# Patient Record
Sex: MALE | Race: WHITE | NOT HISPANIC OR LATINO | Employment: FULL TIME | URBAN - METROPOLITAN AREA
[De-identification: names, ages, dates, MRNs, and addresses within clinical notes are randomized per-mention and may not be internally consistent; named-entity substitution may affect disease eponyms.]

---

## 2017-01-30 ENCOUNTER — GENERIC CONVERSION - ENCOUNTER (OUTPATIENT)
Dept: OTHER | Facility: OTHER | Age: 57
End: 2017-01-30

## 2017-03-03 ENCOUNTER — ALLSCRIPTS OFFICE VISIT (OUTPATIENT)
Dept: OTHER | Facility: OTHER | Age: 57
End: 2017-03-03

## 2017-03-03 DIAGNOSIS — M72.2 PLANTAR FASCIAL FIBROMATOSIS: ICD-10-CM

## 2017-03-13 ENCOUNTER — GENERIC CONVERSION - ENCOUNTER (OUTPATIENT)
Dept: OTHER | Facility: OTHER | Age: 57
End: 2017-03-13

## 2017-04-24 ENCOUNTER — GENERIC CONVERSION - ENCOUNTER (OUTPATIENT)
Dept: OTHER | Facility: OTHER | Age: 57
End: 2017-04-24

## 2017-08-24 ENCOUNTER — GENERIC CONVERSION - ENCOUNTER (OUTPATIENT)
Dept: OTHER | Facility: OTHER | Age: 57
End: 2017-08-24

## 2018-01-14 VITALS
BODY MASS INDEX: 26.21 KG/M2 | DIASTOLIC BLOOD PRESSURE: 78 MMHG | SYSTOLIC BLOOD PRESSURE: 130 MMHG | WEIGHT: 210.8 LBS | HEIGHT: 75 IN | HEART RATE: 72 BPM | TEMPERATURE: 97.8 F

## 2018-05-17 DIAGNOSIS — G47.00 INSOMNIA, UNSPECIFIED TYPE: Primary | ICD-10-CM

## 2018-05-17 RX ORDER — ZOLPIDEM TARTRATE 12.5 MG/1
1 TABLET, FILM COATED, EXTENDED RELEASE ORAL
COMMUNITY
End: 2018-05-17 | Stop reason: SDUPTHER

## 2018-05-17 NOTE — TELEPHONE ENCOUNTER
Patient left voice mail requesting medication refill on his Zolpidem 12 5 MG to be sent to [facility]  Verify diagnosis and quantity   Not in allscripts  Patient has not been seen since 3/2017

## 2018-05-18 RX ORDER — ZOLPIDEM TARTRATE 12.5 MG/1
12.5 TABLET, FILM COATED, EXTENDED RELEASE ORAL
Qty: 30 TABLET | Refills: 0 | Status: SHIPPED | OUTPATIENT
Start: 2018-05-18 | End: 2018-06-19 | Stop reason: SDUPTHER

## 2018-06-19 DIAGNOSIS — G47.00 INSOMNIA, UNSPECIFIED TYPE: ICD-10-CM

## 2018-06-19 RX ORDER — ZOLPIDEM TARTRATE 12.5 MG/1
12.5 TABLET, FILM COATED, EXTENDED RELEASE ORAL
Qty: 15 TABLET | Refills: 0 | Status: SHIPPED | OUTPATIENT
Start: 2018-06-19 | End: 2018-06-19 | Stop reason: SDUPTHER

## 2018-06-19 RX ORDER — ZOLPIDEM TARTRATE 12.5 MG/1
12.5 TABLET, FILM COATED, EXTENDED RELEASE ORAL
Qty: 15 TABLET | Refills: 0 | Status: SHIPPED | OUTPATIENT
Start: 2018-06-19 | End: 2018-06-25 | Stop reason: SDUPTHER

## 2018-06-25 ENCOUNTER — OFFICE VISIT (OUTPATIENT)
Dept: FAMILY MEDICINE CLINIC | Facility: CLINIC | Age: 58
End: 2018-06-25
Payer: COMMERCIAL

## 2018-06-25 VITALS
DIASTOLIC BLOOD PRESSURE: 68 MMHG | HEART RATE: 76 BPM | HEIGHT: 74 IN | WEIGHT: 205.8 LBS | BODY MASS INDEX: 26.41 KG/M2 | RESPIRATION RATE: 18 BRPM | SYSTOLIC BLOOD PRESSURE: 112 MMHG

## 2018-06-25 DIAGNOSIS — G47.00 INSOMNIA, UNSPECIFIED TYPE: Primary | ICD-10-CM

## 2018-06-25 PROCEDURE — 99213 OFFICE O/P EST LOW 20 MIN: CPT | Performed by: FAMILY MEDICINE

## 2018-06-25 PROCEDURE — 3008F BODY MASS INDEX DOCD: CPT | Performed by: FAMILY MEDICINE

## 2018-06-25 RX ORDER — ESOMEPRAZOLE MAGNESIUM 20 MG/1
1 TABLET, DELAYED RELEASE ORAL EVERY 6 HOURS
COMMUNITY
End: 2020-02-19 | Stop reason: CLARIF

## 2018-06-25 RX ORDER — DICYCLOMINE HCL 20 MG
1 TABLET ORAL EVERY 6 HOURS PRN
COMMUNITY
End: 2020-02-19 | Stop reason: CLARIF

## 2018-06-25 RX ORDER — ZOLPIDEM TARTRATE 12.5 MG/1
12.5 TABLET, FILM COATED, EXTENDED RELEASE ORAL
Qty: 30 TABLET | Refills: 5 | Status: SHIPPED | OUTPATIENT
Start: 2018-06-25 | End: 2018-12-18 | Stop reason: SDUPTHER

## 2018-06-25 RX ORDER — LANOLIN ALCOHOL/MO/W.PET/CERES
CREAM (GRAM) TOPICAL
COMMUNITY
Start: 2014-11-24 | End: 2022-05-19

## 2018-06-25 NOTE — PROGRESS NOTES
Assessment/Plan:    Insomnia  Refill med       Diagnoses and all orders for this visit:    Insomnia, unspecified type    Other orders  -     dicyclomine (BENTYL) 20 mg tablet; Take 1 tablet by mouth every 6 (six) hours as needed  -     Esomeprazole Magnesium (NEXIUM 24HR) 20 MG TBEC; Take 1 tablet by mouth every 6 (six) hours  -     glucosamine-chondroitin 500-400 MG tablet;           Subjective:      Patient ID: Walker Combs is a 59 y o  male  Medication Refill   This is a chronic problem  The current episode started more than 1 year ago  The problem occurs constantly  The problem has been unchanged  Associated symptoms include arthralgias  Pertinent negatives include no chest pain or headaches  Nothing aggravates the symptoms  Treatments tried: meds  The treatment provided moderate relief  The following portions of the patient's history were reviewed and updated as appropriate: allergies, current medications, past family history, past medical history, past social history, past surgical history and problem list     Review of Systems   Constitutional: Negative for activity change, appetite change and unexpected weight change  Respiratory: Negative for shortness of breath  Cardiovascular: Negative for chest pain  Gastrointestinal: Negative for abdominal distention  Musculoskeletal: Positive for arthralgias  Neurological: Negative for headaches  Objective:      /68 (BP Location: Right arm, Patient Position: Sitting, Cuff Size: Standard)   Pulse 76   Resp 18   Ht 6' 2 04" (1 881 m)   Wt 93 4 kg (205 lb 12 8 oz)   BMI 26 40 kg/m²          Physical Exam   Constitutional: He appears well-developed and well-nourished

## 2018-11-01 ENCOUNTER — EVALUATION (OUTPATIENT)
Dept: PHYSICAL THERAPY | Facility: CLINIC | Age: 58
End: 2018-11-01
Payer: COMMERCIAL

## 2018-11-01 ENCOUNTER — TRANSCRIBE ORDERS (OUTPATIENT)
Dept: PHYSICAL THERAPY | Facility: CLINIC | Age: 58
End: 2018-11-01

## 2018-11-01 DIAGNOSIS — M25.512 LEFT SHOULDER PAIN, UNSPECIFIED CHRONICITY: Primary | ICD-10-CM

## 2018-11-01 DIAGNOSIS — M25.512 ACUTE PAIN OF LEFT SHOULDER: Primary | ICD-10-CM

## 2018-11-01 PROCEDURE — 97161 PT EVAL LOW COMPLEX 20 MIN: CPT

## 2018-11-01 PROCEDURE — G8991 OTHER PT/OT GOAL STATUS: HCPCS

## 2018-11-01 PROCEDURE — G8990 OTHER PT/OT CURRENT STATUS: HCPCS

## 2018-11-01 NOTE — PROGRESS NOTES
PT Evaluation     Today's date: 2018  Patient name: Derrick Samuel  : [birth year]  MRN: [id number]  Referring provider: Eliot Soto MD  Dx:   Encounter Diagnosis     ICD-10-CM    1  Acute pain of left shoulder M25 512                   Assessment  Impairments: abnormal or restricted ROM, activity intolerance, impaired physical strength, lacks appropriate home exercise program, pain with function and poor posture   Functional limitations: difficulty completing work duties, difficulty with household chores and completing daily self care activities  Assessment details: Derrick Samuel is a 59 y o  male presenting to PT with pain, decreased shoulder range of motion, decreased strength, and decreased tolerance to activity  Patient would benefit from skilled PT services to address these impairments and to maximize function in order to improve quality of life  Thank you for the referral   Understanding of Dx/Px/POC: good   Prognosis: good    Goals  STG  1) L shoulder ROM will improve 50% in 3 weeks  2) L shoulder strength will improve 1/2 grade in 3 weeks  LTG  1) Patient is independent in HEP within 6 weeks  2) L shoulder strength will be WFL and pain free in 6 weeks  3) Patient completes overhead activity with 0-1/10 pain within 6 weeks  Plan  Patient would benefit from: skilled physical therapy  Planned modality interventions: cryotherapy, TENS and unattended electrical stimulation  Planned therapy interventions: joint mobilization, manual therapy, home exercise program, therapeutic exercise, therapeutic activities, stretching, strengthening, postural training, patient education and neuromuscular re-education  Frequency: 2x week  Duration in weeks: 6  Treatment plan discussed with: patient        Subjective Evaluation    History of Present Illness  Mechanism of injury: Patient presents with L shoulder pain gradually worsening, beginning about 6 weeks ago      He states he works in the [email or web address], "lots of heavy lifting, a lot of times to or above shoulder height"  He had a RTC repair on R shoulder in 2015, with 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  He had a cortisone injection into L shoulder two days ago and states he has not noticed any relief since then  He would like to know what exercises he should be doing to improve L shoulder pain and function and avoid surgery down the road  Quality of life: good    Pain  Current pain ratin  At best pain ratin  At worst pain ratin  Quality: discomfort, dull ache and knife-like  Relieving factors: medications and ice  Aggravating factors: overhead activity and lifting  Progression: worsening    Treatments  Previous treatment: injection treatment  Patient Goals  Patient goals for therapy: decreased pain, increased motion, increased strength, independence with ADLs/IADLs and return to sport/leisure activities          Objective     Postural Observations  Seated posture: fair  Standing posture: fair  Correction of posture: makes symptoms better        Palpation   Left   Tenderness of the levator scapulae and upper trapezius  Tenderness     Left Shoulder   No tenderness in the [facility] [place] joint, acromion, biceps tendon (proximal), bicipital groove, subacromial bursa and subscapularis tendon       Cervical/Thoracic Screen   Cervical range of motion within normal limits    Neurological Testing     Sensation     Shoulder   Left Shoulder   Intact: light touch    Right Shoulder   Intact: light touch    Active Range of Motion   Left Shoulder   Flexion: 125 degrees with pain  Abduction: 112 degrees with pain  External rotation BTH: C4   Internal rotation BTB: L2 with pain    Passive Range of Motion   Left Shoulder   Flexion: 140 degrees with pain  Abduction: 120 degrees with pain  External rotation 45°: 45 degrees with pain  Internal rotation 45°: 40 degrees     Joint Play   Left Shoulder  Joints within functional limits are the anterior capsule  Hypomobile in the posterior capsule and inferior capsule  Strength/Myotome Testing     Left Shoulder     Planes of Motion   Flexion: 4-   Extension: 4-   Abduction: 4-   External rotation at 0°: 4-   Internal rotation at 0°: 4-   Horizontal abduction: 3+     Isolated Muscles   Biceps: 4-   Lower trapezius: 3+   Middle trapezius: 3+   Rhomboids: 3+   Subscapularis: 4-   Triceps: 4     Tests     Left Shoulder   Positive empty can, Hawkin's and passive horizontal adduction  Negative full can and bicep load          Flowsheet Rows      Most Recent Value   PT/OT G-Codes   Current Score  64   Projected Score  71   FOTO information reviewed  Yes   Assessment Type  Evaluation   G code set  Other PT/OT Primary   Other PT Primary Current Status ()  CJ   Other PT Primary Goal Status ()  CJ          Precautions: none    Daily Treatment Diary     Manuals             PROM             GHJ AP/Inf mobs                                       Exercise Diary              Pulleys             UBE             Shoulder isometrics             ER door stretch                          TB rows/ext             TB robbers             TB lawn mowers             Standing IR- cane up back                                       Table slides abduction             Serratus punch             Supine AAROM cane flexion             Sleeper stretch                                                                              Modalities             CP prn

## 2018-11-01 NOTE — LETTER
2018    Griselda Reveles MD  [id number] [facility]    Patient: Cynthia Minaya   YOB: 1960   Date of Visit: 2018     Encounter Diagnosis     ICD-10-CM    1  Acute pain of left shoulder M25 512        Dear Dr Raquel Harris:    Please review the attached Plan of Care from [facility] recent visit  Please verify that you agree therapy should continue by signing the attached document and sending it back to our office  If you have any questions or concerns, please don't hesitate to call  Sincerely,    Anayeli Hobbs, PT      Referring Provider:      I certify that I have read the below Plan of Care and certify the need for these services furnished under this plan of treatment while under my care  MD Kurt Lentz [address]: [phone]          PT Evaluation     Today's date: 2018  Patient name: Cynthia Minaya  : [birth year]  MRN: [id number]  Referring provider: Nik Mayo MD  Dx:   Encounter Diagnosis     ICD-10-CM    1  Acute pain of left shoulder M25 512                   Assessment  Impairments: abnormal or restricted ROM, activity intolerance, impaired physical strength, lacks appropriate home exercise program, pain with function and poor posture   Functional limitations: difficulty completing work duties, difficulty with household chores and completing daily self care activities  Assessment details: Cynthia Minaya is a 59 y o  male presenting to PT with pain, decreased shoulder range of motion, decreased strength, and decreased tolerance to activity  Patient would benefit from skilled PT services to address these impairments and to maximize function in order to improve quality of life  Thank you for the referral   Understanding of Dx/Px/POC: good   Prognosis: good    Goals  STG  1) L shoulder ROM will improve 50% in 3 weeks    2) L shoulder strength will improve 1/2 grade in 3 weeks  LTG  1) Patient is independent in HEP within 6 weeks  2) L shoulder strength will be WFL and pain free in 6 weeks  3) Patient completes overhead activity with 0-1/10 pain within 6 weeks  Plan  Patient would benefit from: skilled physical therapy  Planned modality interventions: cryotherapy, TENS and unattended electrical stimulation  Planned therapy interventions: joint mobilization, manual therapy, home exercise program, therapeutic exercise, therapeutic activities, stretching, strengthening, postural training, patient education and neuromuscular re-education  Frequency: 2x week  Duration in weeks: 6  Treatment plan discussed with: patient        Subjective Evaluation    History of Present Illness  Mechanism of injury: Patient presents with L shoulder pain gradually worsening, beginning about 6 weeks ago  He states he works in the [employer], "lots of heavy lifting, a lot of times to or above shoulder height"  He had a RTC repair on R shoulder in , with 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  He had a cortisone injection into L shoulder two days ago and states he has not noticed any relief since then  He would like to know what exercises he should be doing to improve L shoulder pain and function and avoid surgery down the road    Quality of life: good    Pain  Current pain ratin  At best pain ratin  At worst pain ratin  Quality: discomfort, dull ache and knife-like  Relieving factors: medications and ice  Aggravating factors: overhead activity and lifting  Progression: worsening    Treatments  Previous treatment: injection treatment  Patient Goals  Patient goals for therapy: decreased pain, increased motion, increased strength, independence with ADLs/IADLs and return to sport/leisure activities          Objective     Postural Observations  Seated posture: fair  Standing posture: fair  Correction of posture: makes symptoms better        Palpation   Left   Tenderness of the levator scapulae and upper trapezius  Tenderness     Left Shoulder   [facility] joint, acromion, biceps tendon (proximal), bicipital groove, subacromial bursa and subscapularis tendon  Cervical/Thoracic Screen   Cervical range of motion within normal limits    Neurological Testing     Sensation     Shoulder   Left Shoulder   Intact: light touch    Right Shoulder   Intact: light touch    Active Range of Motion   Left Shoulder   Flexion: 125 degrees with pain  Abduction: 112 degrees with pain  External rotation BTH: C4   Internal rotation BTB: L2 with pain    Passive Range of Motion   Left Shoulder   Flexion: 140 degrees with pain  Abduction: 120 degrees with pain  External rotation 45°:  45 degrees with pain  Internal rotation 45°:  40 degrees     Joint Play   Left Shoulder  Joints within functional limits are the anterior capsule  Hypomobile in the posterior capsule and inferior capsule  Strength/Myotome Testing     Left Shoulder     Planes of Motion   Flexion: 4-   Extension: 4-   Abduction: 4-   External rotation at 0°:  4-   Internal rotation at 0°:  4-   Horizontal abduction: 3+     Isolated Muscles   Biceps: 4-   Lower trapezius: 3+   Middle trapezius: 3+   Rhomboids: 3+   Subscapularis: 4-   Triceps: 4     Tests     Left Shoulder   Positive empty can, Hawkin's and passive horizontal adduction  Negative full can and bicep load          Flowsheet Rows      Most Recent Value   PT/OT G-Codes   Current Score  64   Projected Score  71   FOTO information reviewed  Yes   Assessment Type  Evaluation   G code set  Other PT/OT Primary   Other PT Primary Current Status ()  CJ   Other PT Primary Goal Status ()  CJ          Precautions: none    Daily Treatment Diary     Manuals             PROM             GHJ AP/Inf mobs                                       Exercise Diary              Pulleys             UBE             Shoulder isometrics             ER door stretch                          TB rows/ext             TB robbers             TB lawn mowers             Standing IR- cane up back                                       Table slides abduction             Serratus punch             Supine AAROM cane flexion             Sleeper stretch                                                                              Modalities             CP prn

## 2018-11-05 ENCOUNTER — OFFICE VISIT (OUTPATIENT)
Dept: PHYSICAL THERAPY | Facility: CLINIC | Age: 58
End: 2018-11-05
Payer: COMMERCIAL

## 2018-11-05 DIAGNOSIS — M25.512 ACUTE PAIN OF LEFT SHOULDER: Primary | ICD-10-CM

## 2018-11-05 PROCEDURE — 97112 NEUROMUSCULAR REEDUCATION: CPT

## 2018-11-05 PROCEDURE — 97110 THERAPEUTIC EXERCISES: CPT

## 2018-11-05 NOTE — PROGRESS NOTES
Daily Note     Today's date: 2018  Patient name: Mackenzie Conte  : 1960  MRN: [id number]  Referring provider: Kathleen Chanel MD  Dx:   Encounter Diagnosis     ICD-10-CM    1  Acute pain of left shoulder M25 512                   Subjective: Patient reports shoulder feels sore this morning, stating pain is 1/10 due to stiffness  Objective: See treatment diary below  Progressed ROM/stretching and RTC strengthening program today  Assessment: Tolerated treatment fair  Patient reported increased discomfort in posterior cuff with IR and abduction stretches, which subsides slightly with rest however this does limit patient's ability to complete some exercises due to soreness  Patient demonstrated fatigue post treatment, exhibited good technique with therapeutic exercises and would benefit from continued PT      Plan: Continue per plan of care         Precautions: none    Daily Treatment Diary     Manuals             PROM NV            GHJ AP/Inf mobs NV                                      Exercise Diary              Pulleys 6 min, 10 sec hold            UBE 3 min each            Shoulder isometrics (flex, ext, IR, ER) 5 sec x10            ER door stretch 10sec x 10                         TB rows/ext GTB 3x10            TB robbers NP            TB lawn mowers NP            Standing IR- cane up back 10 sec  X 10                                      Table slides abduction 10 sec  x10            Serratus punch 3 sec, 3x10            Supine AAROM cane flexion 10 sec  x10            Side-lying ER 2x10                                                                             Modalities             CP prn 10 min post

## 2018-11-07 ENCOUNTER — OFFICE VISIT (OUTPATIENT)
Dept: PHYSICAL THERAPY | Facility: CLINIC | Age: 58
End: 2018-11-07
Payer: COMMERCIAL

## 2018-11-07 DIAGNOSIS — M25.512 ACUTE PAIN OF LEFT SHOULDER: Primary | ICD-10-CM

## 2018-11-07 PROCEDURE — 97110 THERAPEUTIC EXERCISES: CPT

## 2018-11-07 PROCEDURE — 97112 NEUROMUSCULAR REEDUCATION: CPT

## 2018-11-07 NOTE — PROGRESS NOTES
Daily Note     Today's date: 2018  Patient name: Meenakshi Sandoval  : 1960  MRN: [id number]  Referring provider: Burgess Vince MD  Dx:   Encounter Diagnosis     ICD-10-CM    1  Acute pain of left shoulder M25 512        Start Time: 0900  Stop Time: 1005  Total time in clinic (min): 65 minutes    Subjective: Patient states "I think I over did it on Monday  I had soreness that made it hard to sleep that night " Patient reports soreness today secondary to working last night  Patient had slight irritation with B shoulders during TB ext  Objective: See treatment diary below  Assessment: Tolerated treatment fair  Patient demonstrated fatigue post treatment, exhibited good technique with therapeutic exercises and would benefit from continued PT  Plan: Continue per plan of care       Precautions: none    Daily Treatment Diary     Manuals            PROM NV 10'           GHJ AP/Inf mobs NV np                                     Exercise Diary              Pulleys 6 min, 10 sec hold 6 min, 10 sec hold           UBE 3 min each 3 min each           Shoulder isometrics (flex, ext, IR, ER) 5 sec x10 5 sec x10           ER door stretch 10sec x 10 10sec x10                        TB rows/ext GTB 3x10 GTB  3x10           TB robbers NP np           TB lawn mowers NP np           Standing IR- cane up back 10 sec  X 10 10sec x10                                     Table slides abduction 10 sec  x10 10sec x10           Serratus punch 3 sec, 3x10 3 sec,  3x10           Supine AAROM cane flexion 10 sec  x10 10sec  x10           Side-lying ER 2x10 2x10                                                                            Modalities             CP prn 10 min post

## 2018-11-13 ENCOUNTER — OFFICE VISIT (OUTPATIENT)
Dept: PHYSICAL THERAPY | Facility: CLINIC | Age: 58
End: 2018-11-13
Payer: COMMERCIAL

## 2018-11-13 DIAGNOSIS — M25.512 ACUTE PAIN OF LEFT SHOULDER: Primary | ICD-10-CM

## 2018-11-13 PROCEDURE — 97112 NEUROMUSCULAR REEDUCATION: CPT

## 2018-11-13 PROCEDURE — 97110 THERAPEUTIC EXERCISES: CPT

## 2018-11-13 PROCEDURE — 97140 MANUAL THERAPY 1/> REGIONS: CPT

## 2018-11-13 NOTE — PROGRESS NOTES
Daily Note     Today's date: 2018  Patient name: Evangelina Ballard  :   MRN: [id number]  Referring provider: Bella Kern MD  Dx:   Encounter Diagnosis     ICD-10-CM    1  Acute pain of left shoulder M25 512                   Subjective: Patient reported pain with reaching out to the side (abd) with L UE, happening at work last night when he used that motion, pain reaching 8/10  Objective: See treatment diary below      Assessment: Most difficulty with completion of table slides in abd due to pain response  Guarding and pain response into end ranges for PROM, most limited with abd  Plan: Continue per plan of care         Precautions: none    Daily Treatment Diary   Manuals           PROM NV 10' 10 min          GHJ AP/Inf mobs NV np AN                                    Exercise Diary              Pulleys 6 min, 10 sec hold 6 min, 10 sec hold 6 min, 10" hold          UBE 3 min each 3 min each 3 min ea          Shoulder isometrics (flex, ext, IR, ER) 5 sec x10 5 sec x10 5"x10 ea          ER door stretch 10sec x 10 10sec x10 10"x10                       TB rows/ext GTB 3x10 GTB  3x10 Green  3x10          TB robbers NP np NV          TB lawn mowers NP np NV          Standing IR- cane up back 10 sec  X 10 10sec x10 10"x 10                                    Table slides abduction 10 sec  x10 10sec x10 10"x10          Serratus punch 3 sec, 3x10 3 sec,  3x10 3" hold, 3x10          Supine AAROM cane flexion 10 sec  x10 10sec  x10 10"x10          Side-lying ER 2x10 2x10 2x10                                                                           Modalities             CP prn 10 min post  10 min post

## 2018-11-16 ENCOUNTER — OFFICE VISIT (OUTPATIENT)
Dept: PHYSICAL THERAPY | Facility: CLINIC | Age: 58
End: 2018-11-16
Payer: COMMERCIAL

## 2018-11-16 DIAGNOSIS — M25.512 ACUTE PAIN OF LEFT SHOULDER: Primary | ICD-10-CM

## 2018-11-16 PROCEDURE — 97110 THERAPEUTIC EXERCISES: CPT

## 2018-11-16 PROCEDURE — 97140 MANUAL THERAPY 1/> REGIONS: CPT

## 2018-11-16 NOTE — PROGRESS NOTES
Daily Note     Today's date: 2018  Patient name: Kenia Granados  :   MRN: [id number]  Referring provider: Dharmesh Rogers MD  Dx:   Encounter Diagnosis     ICD-10-CM    1  Acute pain of left shoulder M25 512                   Subjective: Patient reports pain in L shoulder/middle deltoid today, rating as 3/10  He states his pain has been pretty constant, and he has not been able to find any position of comfort or relief of pain over the past week  Objective: See treatment diary below  Modified exercises today to perform less volume per patient request       Assessment: Tolerated treatment fair  Patient able to tolerate completion of program today, with modifications to perform less volume and no resistance for strengthening program  Through manuals, noted limitations in all planes, with increased pain at end range  Patient demonstrated fatigue post treatment, exhibited good technique with therapeutic exercises and would benefit from continued PT      Plan: Continue per plan of care         Precautions: none    Daily Treatment Diary    Manuals          PROM NV 10' 10 min AN         GHJ AP/Inf mobs NV np AN AN                                   Exercise Diary              Pulleys 6 min, 10 sec hold 6 min, 10 sec hold 6 min, 10" hold 6 min, 10 sec hold         UBE 3 min each 3 min each 3 min ea 3 min each         Shoulder isometrics (flex, ext, IR, ER) 5 sec x10 5 sec x10 5"x10 ea 5"x10 ea         ER door stretch 10sec x 10 10sec x10 10"x10 10"x10                      TB rows/ext GTB 3x10 GTB  3x10 Green  3x10 NP         TB robbers NP np NV NP         TB lawn mowers NP np NV NP         Standing IR- cane up back 10 sec  X 10 10sec x10 10" x10 10 sec x10                                   Table slides abduction 10 sec  x10 10sec x10 10"x10 10 sec x10         Serratus punch 3 sec, 3x10 3 sec,  3x10 3" hold, 3x10 3 sec, 3x10         Supine AAROM cane flexion 10 sec  x10 10sec  x10 10"x10 10 sec x10         Side-lying ER 2x10 2x10 2x10 10x                                                                          Modalities             CP prn 10 min post  10 min post 10 min post

## 2018-11-19 ENCOUNTER — OFFICE VISIT (OUTPATIENT)
Dept: PHYSICAL THERAPY | Facility: CLINIC | Age: 58
End: 2018-11-19
Payer: COMMERCIAL

## 2018-11-19 DIAGNOSIS — M25.512 ACUTE PAIN OF LEFT SHOULDER: Primary | ICD-10-CM

## 2018-11-19 PROCEDURE — 97140 MANUAL THERAPY 1/> REGIONS: CPT

## 2018-11-19 PROCEDURE — 97110 THERAPEUTIC EXERCISES: CPT

## 2018-11-19 NOTE — PROGRESS NOTES
Daily Note     Today's date: 2018  Patient name: Derrick Samuel  : 1960  MRN: [id number]  Referring provider: Eliot Soto MD  Dx:   Encounter Diagnosis     ICD-10-CM    1  Acute pain of left shoulder M25 512                   Subjective: Patient rates L shoulder pain as 1/10 upon arrival today, stating he essentially took the weekend off to allow his shoulder to rest after increased pain last week  Objective: See treatment diary below  Assessment: Tolerated treatment well  Completed manuals at beginning of session today, before doing any active stretching or strengthening  Patient reports this feels a little better today  He does opt for CP at end of session due to muscle soreness after strengthening  Patient demonstrated fatigue post treatment, exhibited good technique with therapeutic exercises and would benefit from continued PT      Plan: Continue per plan of care         Precautions: none    Daily Treatment Diary    Manuals         PROM NV 10' 10 min AN AN        GHJ AP/Inf mobs NV np AN AN AN                                  Exercise Diary              Pulleys 6 min, 10 sec hold 6 min, 10 sec hold 6 min, 10" hold 6 min, 10 sec hold 6 min, 10 sec hold        UBE 3 min each 3 min each 3 min ea 3 min each 2 min each        Shoulder isometrics (flex, ext, IR, ER) 5 sec x10 5 sec x10 5"x10 ea 5"x10 ea 5 sec x10 ea        ER door stretch 10sec x 10 10sec x10 10"x10 10"x10 10sec x10                     TB rows/ext GTB 3x10 GTB  3x10 Green  3x10 NP NV        TB robbers NP np NV NP NP        TB lawn mowers NP np NV NP NP        Standing IR- cane up back 10 sec  X 10 10sec x10 10" x10 10 sec x10 10 sec x10                                  Table slides abduction 10 sec  x10 10sec x10 10"x10 10 sec x10 10 sec x10        Serratus punch 3 sec, 3x10 3 sec,  3x10 3" hold, 3x10 3 sec, 3x10 3 sec, 3x10        Supine AAROM cane flexion 10 sec  x10 10sec  x10 10"x10 10 sec x10 10 sec x10        Side-lying ER 2x10 2x10 2x10 10x 10x                                                                         Modalities             CP prn 10 min post  10 min post 10 min post 10 min post

## 2018-11-23 ENCOUNTER — OFFICE VISIT (OUTPATIENT)
Dept: PHYSICAL THERAPY | Facility: CLINIC | Age: 58
End: 2018-11-23
Payer: COMMERCIAL

## 2018-11-23 DIAGNOSIS — M25.512 ACUTE PAIN OF LEFT SHOULDER: Primary | ICD-10-CM

## 2018-11-23 PROCEDURE — G8991 OTHER PT/OT GOAL STATUS: HCPCS

## 2018-11-23 PROCEDURE — 97110 THERAPEUTIC EXERCISES: CPT

## 2018-11-23 PROCEDURE — 97140 MANUAL THERAPY 1/> REGIONS: CPT

## 2018-11-23 PROCEDURE — G8990 OTHER PT/OT CURRENT STATUS: HCPCS

## 2018-11-23 NOTE — PROGRESS NOTES
PT Re-Evaluation     Today's date: 2018  Patient name: Jamin Ford  :   MRN: [id number]  Referring provider: Judith Jimenez MD  Dx:   Encounter Diagnosis     ICD-10-CM    1  Acute pain of left shoulder M25 512                   Assessment  Assessment details: Jamin Ford has been compliant with attending PT and HEP since initial eval  Caity Hayward has made improvements in objective data since IE but is still limited compared to normal  Caity Hayward continues with above listed impairments, including pain which limits ROM and strength, and would benefit from additional skilled PT to address these deficits to return to PLOF  Impairments: abnormal or restricted ROM, activity intolerance, impaired physical strength, pain with function and poor posture   Functional limitations: difficulty completing work duties, difficulty with household chores and completing daily self care activities  Symptom irritability: moderateUnderstanding of Dx/Px/POC: good   Prognosis: good    Goals  STG  1) L shoulder ROM will improve 50% in 3 weeks  -Partially met  2) L shoulder strength will improve 1/2 grade in 3 weeks  -Met  LTG  1) Patient is independent in HEP within 6 weeks  2) L shoulder strength will be WFL and pain free in 6 weeks  3) Patient completes overhead activity with 0-1/10 pain within 6 weeks  Plan  Patient would benefit from: skilled physical therapy  Planned modality interventions: cryotherapy  Planned therapy interventions: joint mobilization, manual therapy, home exercise program, therapeutic exercise, therapeutic activities, stretching, strengthening, postural training, patient education, neuromuscular re-education and body mechanics training  Frequency: 2x week  Duration in weeks: 4  Treatment plan discussed with: patient        Subjective Evaluation    History of Present Illness  Mechanism of injury: RE (): Patient reports L shoulder is feeling okay today   He states after his previous session 3 days ago he was driving home and felt increased muscle discomfort in mid back, radiating out from the center  He does not believe this was related to any activity performed at therapy, and thinks he may have aggravated it at work the night before  He is scheduled to return to MD on , and believes he should continue with skilled PT  From  (18): Patient presents with L shoulder pain gradually worsening, beginning about 6 weeks ago  He states he works in the [employer], "lots of heavy lifting, a lot of times to or above shoulder height"  He had a RTC repair on R shoulder in , with 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  He had a cortisone injection into L shoulder two days ago and states he has not noticed any relief since then  He would like to know what exercises he should be doing to improve L shoulder pain and function and avoid surgery down the road  Quality of life: good    Pain  Current pain ratin  At best pain ratin  At worst pain ratin  Quality: discomfort, dull ache and sharp  Relieving factors: ice  Aggravating factors: overhead activity and lifting  Progression: improved    Treatments  Previous treatment: injection treatment  Patient Goals  Patient goals for therapy: decreased pain, increased motion, increased strength, independence with ADLs/IADLs and return to sport/leisure activities          Objective     Postural Observations  Seated posture: fair  Standing posture: fair  Correction of posture: makes symptoms better        Palpation   Left   Tenderness of the levator scapulae and upper trapezius  Tenderness     Left Shoulder   No tenderness in the [facility] [place] joint, acromion, biceps tendon (proximal), bicipital groove, subacromial bursa and subscapularis tendon       Cervical/Thoracic Screen   Cervical range of motion within normal limits    Neurological Testing     Sensation     Shoulder   Left Shoulder   Intact: light touch    Right Shoulder   Intact: light touch    Active Range of Motion   Left Shoulder   Flexion: 140 degrees with pain  Abduction: 120 degrees with pain  External rotation 0°: 65 degrees   Internal rotation BTB: L1 with pain    Passive Range of Motion   Left Shoulder   Flexion: 145 degrees with pain  Abduction: 120 degrees with pain  External rotation 45°: 60 degrees   Internal rotation 0°: [facility]    Joint Play   Left Shoulder  Joints within functional limits are the anterior capsule, posterior capsule and inferior capsule  Strength/Myotome Testing     Left Shoulder     Planes of Motion   Flexion: 4   Extension: 4   Abduction: 4   External rotation at 0°: 4   Internal rotation at 0°: 4+   Horizontal abduction: 4-     Isolated Muscles   Biceps: 4+   Lower trapezius: 4-   Middle trapezius: 4-   Rhomboids: 4-   Subscapularis: 4-   Triceps: 4+     Tests     Left Shoulder   Positive empty can, Hawkin's and passive horizontal adduction  Negative full can and bicep load          Flowsheet Rows      Most Recent Value   PT/OT G-Codes   Current Score  75   Projected Score  71   FOTO information reviewed  Yes   Assessment Type  Re-evaluation   G code set  Other PT/OT Primary   Other PT Primary Current Status ()  CJ   Other PT Primary Goal Status ()  CJ          Precautions: none    Daily Treatment Diary    Manuals 11/5 11/7 11/13 11/16 11/19 11/23       PROM NV 10' 10 min AN AN AN       GHJ AP/Inf mobs NV np AN AN AN AN                                 Exercise Diary              Pulleys 6 min, 10 sec hold 6 min, 10 sec hold 6 min, 10" hold 6 min, 10 sec hold 6 min, 10 sec hold 6 min, 10 sec hold       UBE 3 min each 3 min each 3 min ea 3 min each 2 min each 2 min each       Shoulder isometrics (flex, ext, IR, ER) 5 sec x10 5 sec x10 5"x10 ea 5"x10 ea 5 sec x10 ea 5 sec x10 ea       ER door stretch 10sec x 10 10sec x10 10"x10 10"x10 10sec x10 10 sec  x10                    TB rows/ext GTB 3x10 GTB  3x10 Green  3x10 NP NV NP       TB robbers NP np NV NP NP NP       TB lawn mowers NP np NV NP NP NP       Standing IR- cane up back 10 sec  X 10 10sec x10 10" x10 10 sec  x10 10 sec  x10 10 sec  x10                                 Table slides abduction 10 sec  x10 10sec x10 10"x10 10 sec  x10 10 sec  x10 10 sec  x10       Serratus punch 3 sec, 3x10 3 sec,  3x10 3 sec, 3x10 3 sec, 3x10 3 sec, 3x10 3 sec, 2x20       Supine AAROM cane flexion 10 sec  x10 10sec  x10 10"x10 10 sec  x10 10 sec  x10 10 sec  x10       Side-lying ER 2x10 2x10 2x10 10x 10x 2x10                                                                        Modalities             CP prn 10 min post  10 min post 10 min post 10 min post Pt defers

## 2018-11-23 NOTE — LETTER
2018    Griselda Reveles MD  [address]    Patient: Cynthia Minaya   YOB: 1960   Date of Visit: 2018     Encounter Diagnosis     ICD-10-CM    1  Acute pain of left shoulder M25 512        Dear Dr Raquel Harris:    Please review the attached Plan of Care from [facility] 42 recent visit  Please verify that you agree therapy should continue by signing the attached document and sending it back to our office  If you have any questions or concerns, please don't hesitate to call  Sincerely,    Anayeli Hobbs, PT      Referring Provider:      I certify that I have read the below Plan of Care and certify the need for these services furnished under this plan of treatment while under my care  Griselda Reveles MD  [address] [place] [address] Road: [address] [place] [address]          PT Re-Evaluation     Today's date: 2018  Patient name: Cynthia Minaya  :   MRN: [id number]  Referring provider: Nik Mayo MD  Dx:   Encounter Diagnosis     ICD-10-CM    1  Acute pain of left shoulder M25 512                   Assessment  Assessment details: Cynthia Minaya has been compliant with attending PT and HEP since initial eval  Yoshi Feliz has made improvements in objective data since IE but is still limited compared to normal  Yoshi Feliz continues with above listed impairments, including pain which limits ROM and strength, and would benefit from additional skilled PT to address these deficits to return to PLOF    Impairments: abnormal or restricted ROM, activity intolerance, impaired physical strength, pain with function and poor posture   Functional limitations: difficulty completing work duties, difficulty with household chores and completing daily self care activities  Symptom irritability: moderateUnderstanding of Dx/Px/POC: good   Prognosis: good    Goals  STG  1) L shoulder ROM will improve 50% in 3 weeks  -Partially met  2) L shoulder strength will improve 1/2 grade in 3 weeks  -Met  LTG  1) Patient is independent in HEP within 6 weeks  2) L shoulder strength will be WFL and pain free in 6 weeks  3) Patient completes overhead activity with 0-1/10 pain within 6 weeks  Plan  Patient would benefit from: skilled physical therapy  Planned modality interventions: cryotherapy  Planned therapy interventions: joint mobilization, manual therapy, home exercise program, therapeutic exercise, therapeutic activities, stretching, strengthening, postural training, patient education, neuromuscular re-education and body mechanics training  Frequency: 2x week  Duration in weeks: 4  Treatment plan discussed with: patient        Subjective Evaluation    History of Present Illness  Mechanism of injury: RE (): Patient reports L shoulder is feeling okay today  He states after his previous session 3 days ago he was driving home and felt increased muscle discomfort in mid back, radiating out from the center  He does not believe this was related to any activity performed at therapy, and thinks he may have aggravated it at work the night before  He is scheduled to return to [place] on , and believes he should continue with skilled PT  From IE (18): Patient presents with L shoulder pain gradually worsening, beginning about 6 weeks ago  He states he works in the [employer], "lots of heavy lifting, a lot of times to or above shoulder height"  He had a RTC repair on R shoulder in , with 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  He had a cortisone injection into L shoulder two days ago and states he has not noticed any relief since then  He would like to know what exercises he should be doing to improve L shoulder pain and function and avoid surgery down the road    Quality of life: good    Pain  Current pain ratin  At best pain ratin  At worst pain ratin  Quality: discomfort, dull ache and sharp  Relieving factors: ice  Aggravating factors: overhead activity and lifting  Progression: improved    Treatments  Previous treatment: injection treatment  Patient Goals  Patient goals for therapy: decreased pain, increased motion, increased strength, independence with ADLs/IADLs and return to sport/leisure activities          Objective     Postural Observations  Seated posture: fair  Standing posture: fair  Correction of posture: makes symptoms better        Palpation   Left   Tenderness of the levator scapulae and upper trapezius  Tenderness     Left Shoulder   No tenderness in the [facility] joint, acromion, biceps tendon (proximal), bicipital groove, subacromial bursa and subscapularis tendon  Cervical/Thoracic Screen   Cervical range of motion within normal limits    Neurological Testing     Sensation     Shoulder   Left Shoulder   Intact: light touch    Right Shoulder   Intact: light touch    Active Range of Motion   Left Shoulder   Flexion: 140 degrees with pain  Abduction: 120 degrees with pain  External rotation 0°:  65 degrees   Internal rotation BTB: L1 with pain    Passive Range of Motion   Left Shoulder   Flexion: 145 degrees with pain  Abduction: 120 degrees with pain  External rotation 45°:  60 degrees   Internal rotation 0°:  [facility]    Joint Play   Left Shoulder  Joints within functional limits are the anterior capsule, posterior capsule and inferior capsule  Strength/Myotome Testing     Left Shoulder     Planes of Motion   Flexion: 4   Extension: 4   Abduction: 4   External rotation at 0°:  4   Internal rotation at 0°:  4+   Horizontal abduction: 4-     Isolated Muscles   Biceps: 4+   Lower trapezius: 4-   Middle trapezius: 4-   Rhomboids: 4-   Subscapularis: 4-   Triceps: 4+     Tests     Left Shoulder   Positive empty can, Hawkin's and passive horizontal adduction  Negative full can and bicep load          Flowsheet Rows      Most Recent Value   PT/OT G-Codes Current Score  75   Projected Score  71   FOTO information reviewed  Yes   Assessment Type  Re-evaluation   G code set  Other PT/OT Primary   Other PT Primary Current Status ()  CJ   Other PT Primary Goal Status ()  CJ          Precautions: none    Daily Treatment Diary    Manuals 11/5 11/7 11/13 11/16 11/19 11/23       PROM NV 10' 10 min AN AN AN       GHJ AP/Inf mobs NV np AN AN AN AN                                 Exercise Diary              Pulleys 6 min, 10 sec hold 6 min, 10 sec hold 6 min, 10" hold 6 min, 10 sec hold 6 min, 10 sec hold 6 min, 10 sec hold       UBE 3 min each 3 min each 3 min ea 3 min each 2 min each 2 min each       Shoulder isometrics (flex, ext, IR, ER) 5 sec x10 5 sec x10 5"x10 ea 5"x10 ea 5 sec x10 ea 5 sec x10 ea       ER door stretch 10sec x 10 10sec x10 10"x10 10"x10 10sec x10 10 sec  x10                    TB rows/ext GTB 3x10 GTB  3x10 Green  3x10 NP NV NP       TB robbers NP np NV NP NP NP       TB lawn mowers NP np NV NP NP NP       Standing IR- cane up back 10 sec  X 10 10sec x10 10" x10 10 sec  x10 10 sec  x10 10 sec  x10                                 Table slides abduction 10 sec  x10 10sec x10 10"x10 10 sec  x10 10 sec  x10 10 sec  x10       Serratus punch 3 sec, 3x10 3 sec,  3x10 3 sec, 3x10 3 sec, 3x10 3 sec, 3x10 3 sec, 2x20       Supine AAROM cane flexion 10 sec  x10 10sec  x10 10"x10 10 sec  x10 10 sec  x10 10 sec  x10       Side-lying ER 2x10 2x10 2x10 10x 10x 2x10                                                                        Modalities             CP prn 10 min post  10 min post 10 min post 10 min post Pt defers

## 2018-11-26 ENCOUNTER — OFFICE VISIT (OUTPATIENT)
Dept: PHYSICAL THERAPY | Facility: CLINIC | Age: 58
End: 2018-11-26
Payer: COMMERCIAL

## 2018-11-26 DIAGNOSIS — M25.512 ACUTE PAIN OF LEFT SHOULDER: Primary | ICD-10-CM

## 2018-11-26 PROCEDURE — 97110 THERAPEUTIC EXERCISES: CPT

## 2018-11-26 PROCEDURE — 97140 MANUAL THERAPY 1/> REGIONS: CPT

## 2018-11-26 NOTE — PROGRESS NOTES
Daily Note     Today's date: 2018  Patient name: El Van  :   MRN: [id number]  Referring provider: Declan Suazo MD  Dx:   Encounter Diagnosis     ICD-10-CM    1  Acute pain of left shoulder M25 512                   Subjective: Patient has no issues to report today, stating he has a stiff neck from sleeping wrong  Objective: See treatment diary below  Assessment: Tolerated treatment well  Good tolerance to performance of exercises with slight increase in volume today  No reported pain throughout session, save for discomfort during doorway ER stretch  Patient demonstrated fatigue post treatment, exhibited good technique with therapeutic exercises and would benefit from continued PT      Plan: Continue per plan of care         Precautions: none    Daily Treatment Diary    Manuals       PROM NV 10' 10 min AN AN AN AN      GHJ AP/Inf mobs NV np AN AN AN AN AN                                Exercise Diary              Pulleys 6 min, 10 sec hold 6 min, 10 sec hold 6 min, 10" hold 6 min, 10 sec hold 6 min, 10 sec hold 6 min, 10 sec hold 6 min, 10 sec hold      UBE 3 min each 3 min each 3 min ea 3 min each 2 min each 2 min each 2 min each      Shoulder isometrics (flex, ext, IR, ER) 5 sec x10 5 sec x10 5"x10 ea 5"x10 ea 5 sec x10 ea 5 sec x10 ea 5 sec x10 ea      ER door stretch 10sec x 10 10sec x10 10"x10 10"x10 10sec x10 10 sec  x10 10 sec  x10                   TB rows/ext GTB 3x10 GTB  3x10 Green  3x10 NP NV NP GTB 10x      TB robbers NP np NV NP NP NP NP      TB lawn mowers NP np NV NP NP NP NP      Standing IR- cane up back 10 sec  X 10 10sec x10 10" x10 10 sec  x10 10 sec  x10 10 sec  x10 10 sec  x10                                Table slides abduction 10 sec  x10 10sec x10 10"x10 10 sec  x10 10 sec  x10 10 sec  x10 10 sec  x10      Serratus punch 3 sec, 3x10 3 sec,  3x10 3 sec, 3x10 3 sec, 3x10 3 sec, 3x10 3 sec, 2x20 3 sec, 2x20 Supine AAROM cane flexion 10 sec  x10 10sec  x10 10"x10 10 sec  x10 10 sec  x10 10 sec  x10 10 sec  x10      Side-lying ER 2x10 2x10 2x10 10x 10x 2x10 1# 2x10                                                                       Modalities             CP prn 10 min post  10 min post 10 min post 10 min post Pt defers 10 min post

## 2018-11-29 ENCOUNTER — OFFICE VISIT (OUTPATIENT)
Dept: PHYSICAL THERAPY | Facility: CLINIC | Age: 58
End: 2018-11-29
Payer: COMMERCIAL

## 2018-11-29 DIAGNOSIS — M25.512 ACUTE PAIN OF LEFT SHOULDER: Primary | ICD-10-CM

## 2018-11-29 PROCEDURE — 97110 THERAPEUTIC EXERCISES: CPT

## 2018-11-29 PROCEDURE — 97140 MANUAL THERAPY 1/> REGIONS: CPT

## 2018-11-29 NOTE — PROGRESS NOTES
Daily Note     Today's date: 2018  Patient name: Jaspreet Marin  :   MRN: [id number]  Referring provider: Ernst Neri MD  Dx:   Encounter Diagnosis     ICD-10-CM    1  Acute pain of left shoulder M25 512                   Subjective: Patient reports he has increased R sided mid to low back pain, stating he noticed it when he woke up and after playing with his dog  He is wondering if this is related to his L shoulder pain since this is becoming more common (second instance in two weeks)  He also tells me he is scheduled for an MRI of L shoulder on , and a follow up with MD on   Objective: See treatment diary below  Assessment: Tolerated treatment well  Patient able to tolerate and complete all planned interventions today  Held on RTC isometrics due to patients reports of increased discomfort today  He is able to perform all other activities today and has no adverse effects or increase in discomfort throughout session  Patient demonstrated fatigue post treatment, exhibited good technique with therapeutic exercises and would benefit from continued PT      Plan: Continue per plan of care         Precautions: none    Daily Treatment Diary    Manuals      PROM NV 10' 10 min AN AN AN AN AN     GHJ AP/Inf mobs NV np AN AN AN AN AN AN                               Exercise Diary              Pulleys 6 min, 10 sec hold 6 min, 10 sec hold 6 min, 10" hold 6 min, 10 sec hold 6 min, 10 sec hold 6 min, 10 sec hold 6 min, 10 sec hold 6 min, 10 sec hold     UBE 3 min each 3 min each 3 min ea 3 min each 2 min each 2 min each 2 min each 2 min each     Shoulder isometrics (flex, ext, IR, ER) 5 sec x10 5 sec x10 5"x10 ea 5"x10 ea 5 sec x10 ea 5 sec x10 ea 5 sec x10 ea NV     ER door stretch 10sec x 10 10sec x10 10"x10 10"x10 10sec x10 10 sec  x10 10 sec  x10 10 sec  x10                  TB rows/ext GTB 3x10 GTB  3x10 Green  3x10 NP NV NP GTB 10x GTB 2x10     TB robbers NP np NV NP NP NP NP NP     TB lawn mowers NP np NV NP NP NP NP NP     Standing IR- cane up back 10 sec  X 10 10sec x10 10" x10 10 sec  x10 10 sec  x10 10 sec  x10 10 sec  x10 10 sec  x10                               Table slides abduction 10 sec  x10 10sec x10 10"x10 10 sec  x10 10 sec  x10 10 sec  x10 10 sec  x10 10 sec  x10     Serratus punch 3 sec, 3x10 3 sec,  3x10 3 sec, 3x10 3 sec, 3x10 3 sec, 3x10 3 sec, 2x20 3 sec, 2x20 1#, 3 sec, 2x10     Supine AAROM cane flexion 10 sec  x10 10sec  x10 10"x10 10 sec  x10 10 sec  x10 10 sec  x10 10 sec  x10 10 sec  x10     Side-lying ER 2x10 2x10 2x10 10x 10x 2x10 1# 2x10 1# 2x10                                                                      Modalities             CP prn 10 min post  10 min post 10 min post 10 min post Pt defers 10 min post 10 min post

## 2018-12-04 ENCOUNTER — OFFICE VISIT (OUTPATIENT)
Dept: PHYSICAL THERAPY | Facility: CLINIC | Age: 58
End: 2018-12-04
Payer: COMMERCIAL

## 2018-12-04 DIAGNOSIS — M25.512 ACUTE PAIN OF LEFT SHOULDER: Primary | ICD-10-CM

## 2018-12-04 PROCEDURE — 97110 THERAPEUTIC EXERCISES: CPT

## 2018-12-04 PROCEDURE — 97140 MANUAL THERAPY 1/> REGIONS: CPT

## 2018-12-04 NOTE — PROGRESS NOTES
Daily Note     Today's date: 2018  Patient name: Gabriel Dawson  :   MRN: [id number]  Referring provider: Guillermo Montoya MD  Dx:   Encounter Diagnosis     ICD-10-CM    1  Acute pain of left shoulder M25 512                   Subjective: Patient reports shoulder is not bothering him this morning, stating "today is a good day"  No pain upon arrival this morning, and he feels since his last visit the discomfort has not been as intense  MRI of L shoulder on , and a follow up with MD on   Objective: See treatment diary below  Assessment: Tolerated treatment well  Patient with good performance of exercises today with no reported increase in discomfort or pain in L shoulder throughout session  Through manuals, patient still noted to have limited PROM available, muscle guarding limiting ROM  Patient demonstrated fatigue post treatment, exhibited good technique with therapeutic exercises and would benefit from continued PT      Plan: Continue per plan of care         Precautions: none    Daily Treatment Diary    Manuals     PROM NV 10' 10 min AN AN AN AN AN AN    GHJ AP/Inf mobs NV np AN AN AN AN AN AN AN                              Exercise Diary              Pulleys 6 min, 10 sec hold 6 min, 10 sec hold 6 min, 10" hold 6 min, 10 sec hold 6 min, 10 sec hold 6 min, 10 sec hold 6 min, 10 sec hold 6 min, 10 sec hold 6 min, 10 sec hold    UBE 3 min each 3 min each 3 min ea 3 min each 2 min each 2 min each 2 min each 2 min each 2 min each    Shoulder isometrics (flex, ext, IR, ER) 5 sec x10 5 sec x10 5"x10 ea 5"x10 ea 5 sec x10 ea 5 sec x10 ea 5 sec x10 ea NV NV    ER door stretch 10sec x 10 10sec x10 10"x10 10"x10 10sec x10 10 sec  x10 10 sec  x10 10 sec  x10 10 sec  x10                 TB rows/ext GTB 3x10 GTB  3x10 Green  3x10 NP NV NP GTB 10x GTB 2x10 NV    TB robbers NP np NV NP NP NP NP NP NP    TB lawn mowers NP np NV NP NP NP NP NP NP    Standing IR- cane up back 10 sec  X 10 10sec x10 10" x10 10 sec  x10 10 sec  x10 10 sec  x10 10 sec  x10 10 sec  x10 10 sec  x10                              Table slides abduction 10 sec  x10 10sec x10 10"x10 10 sec  x10 10 sec  x10 10 sec  x10 10 sec  x10 10 sec  x10 10 sec  x10    Serratus punch 3 sec, 3x10 3 sec,  3x10 3 sec, 3x10 3 sec, 3x10 3 sec, 3x10 3 sec, 2x20 3 sec, 2x20 1#, 3 sec, 2x10 1#, 3 sec, 3x10    Supine AAROM cane flexion 10 sec  x10 10sec  x10 10"x10 10 sec  x10 10 sec  x10 10 sec  x10 10 sec  x10 10 sec  x10 10 sec  x10    Side-lying ER 2x10 2x10 2x10 10x 10x 2x10 1# 2x10 1# 2x10 1# 2x10                                                                     Modalities             CP prn 10 min post  10 min post 10 min post 10 min post Pt defers 10 min post 10 min post 10 min post

## 2018-12-06 ENCOUNTER — OFFICE VISIT (OUTPATIENT)
Dept: PHYSICAL THERAPY | Facility: CLINIC | Age: 58
End: 2018-12-06
Payer: COMMERCIAL

## 2018-12-06 DIAGNOSIS — M25.512 ACUTE PAIN OF LEFT SHOULDER: Primary | ICD-10-CM

## 2018-12-06 PROCEDURE — 97110 THERAPEUTIC EXERCISES: CPT

## 2018-12-06 PROCEDURE — 97140 MANUAL THERAPY 1/> REGIONS: CPT

## 2018-12-06 NOTE — PROGRESS NOTES
Daily Note     Today's date: 2018  Patient name: Ozzy Woods  : [birth year]1  MRN: [id number]  Referring provider: Fredy Mazariegos MD  Dx:   Encounter Diagnosis     ICD-10-CM    1  Acute pain of left shoulder M25 512                   Subjective: Patient states his shoulder has been feeling good since last week  No instances of increased pain or discomfort from last visit until now  He has his MRI of L shoulder tomorrow (), and a follow up with MD on   Objective: See treatment diary below  Assessment: Tolerated treatment well  No increased pain or discomfort with reintroduction of RTC isometric strengthening exercises, advised patient to reintroduce these at home as well  Patient demonstrated fatigue post treatment, exhibited good technique with therapeutic exercises and would benefit from continued PT      Plan: Continue per plan of care         Precautions: none    Daily Treatment Diary    Manuals    PROM NV 10' 10 min AN AN AN AN AN AN AN   GHJ AP/Inf mobs NV np AN AN AN AN AN AN AN AN                             Exercise Diary              Pulleys 6 min, 10 sec hold 6 min, 10 sec hold 6 min, 10" hold 6 min, 10 sec hold 6 min, 10 sec hold 6 min, 10 sec hold 6 min, 10 sec hold 6 min, 10 sec hold 6 min, 10 sec hold 6 min, 10 sec hold   UBE 3 min each 3 min each 3 min ea 3 min each 2 min each 2 min each 2 min each 2 min each 2 min each 2 min each   Shoulder isometrics (flex, ext, IR, ER) 5 sec x10 5 sec x10 5"x10 ea 5"x10 ea 5 sec x10 ea 5 sec x10 ea 5 sec x10 ea NV NV 5 sec  x5 ea   ER door stretch 10sec x 10 10sec x10 10"x10 10"x10 10sec x10 10 sec  x10 10 sec  x10 10 sec  x10 10 sec  x10 10 sec  x10                TB rows/ext GTB 3x10 GTB  3x10 Green  3x10 NP NV NP GTB 10x GTB 2x10 NV NV   TB robbers NP np NV NP NP NP NP NP NP NP   TB lawn mowers NP np NV NP NP NP NP NP NP NP   Standing IR- cane up back 10 sec  X 10 10sec x10 10" x10 10 sec  x10 10 sec  x10 10 sec  x10 10 sec  x10 10 sec  x10 10 sec  x10 10 sec  x10                             Table slides abduction 10 sec  x10 10sec x10 10"x10 10 sec  x10 10 sec  x10 10 sec  x10 10 sec  x10 10 sec  x10 10 sec  x10 10 sec  x10   Serratus punch 3 sec, 3x10 3 sec,  3x10 3 sec, 3x10 3 sec, 3x10 3 sec, 3x10 3 sec, 2x20 3 sec, 2x20 1#, 3 sec, 2x10 1#, 3 sec, 3x10 1#, 3 sec, 3x10   Supine AAROM cane flexion 10 sec  x10 10sec  x10 10"x10 10 sec  x10 10 sec  x10 10 sec  x10 10 sec  x10 10 sec  x10 10 sec  x10 10 sec  x10   Side-lying ER 2x10 2x10 2x10 10x 10x 2x10 1# 2x10 1# 2x10 1# 2x10 1# 2x10                                                                    Modalities             CP prn 10 min post  10 min post 10 min post 10 min post Pt defers 10 min post 10 min post 10 min post 10 min post

## 2018-12-10 ENCOUNTER — OFFICE VISIT (OUTPATIENT)
Dept: PHYSICAL THERAPY | Facility: CLINIC | Age: 58
End: 2018-12-10
Payer: COMMERCIAL

## 2018-12-10 DIAGNOSIS — M25.512 ACUTE PAIN OF LEFT SHOULDER: Primary | ICD-10-CM

## 2018-12-10 PROCEDURE — 97140 MANUAL THERAPY 1/> REGIONS: CPT | Performed by: PHYSICAL THERAPY ASSISTANT

## 2018-12-10 PROCEDURE — 97110 THERAPEUTIC EXERCISES: CPT | Performed by: PHYSICAL THERAPY ASSISTANT

## 2018-12-10 NOTE — PROGRESS NOTES
Daily Note     Today's date: 12/10/2018  Patient name: Fariha Boland  :   MRN: [id number]  Referring provider: Zak Powell MD  Dx:   Encounter Diagnosis     ICD-10-CM    1  Acute pain of left shoulder M25 512                   Subjective: Pt reports he had his MRI last week and has a f/u with MD on   Pt states his shoulder has felt worse over this last weekend and he is not sure why  Pt reports pain levels to /10 with activity  Objective: See treatment diary below  Assessment: Tolerated treatment well  No increased pain or discomfort with TE as noted  No progressions this session as pt reported increased pain OTW and is awaiting MRI results this week  Patient demonstrated fatigue post treatment, exhibited good technique with therapeutic exercises and would benefit from continued PT      Plan: Continue per plan of care         Precautions: none    Daily Treatment Diary    Manuals 11/16 11/19 11/23 11/26 11/29 12/4 12/6 12/10     PROM AN AN AN AN AN AN AN RK     GHJ AP/Inf mobs AN AN AN AN AN AN AN np                               Exercise Diary              Pulleys 6 min, 10 sec hold 6 min, 10 sec hold 6 min, 10 sec hold 6 min, 10 sec hold 6 min, 10 sec hold 6 min, 10 sec hold 6 min, 10 sec hold 6 min  10 sec hold     UBE 3 min each 2 min each 2 min each 2 min each 2 min each 2 min each 2 min each 2 min each     Shoulder isometrics (flex, ext, IR, ER) 5"x10 ea 5 sec x10 ea 5 sec x10 ea 5 sec x10 ea NV NV 5 sec  x5 ea 5 sec   X 5 ea     ER door stretch 10"x10 10sec x10 10 sec  x10 10 sec  x10 10 sec  x10 10 sec  x10 10 sec  x10 10 sec  X 10                  TB rows/ext NP NV NP GTB 10x GTB 2x10 NV NV np     TB robbers NP NP NP NP NP NP NP np     TB lawn mowers NP NP NP NP NP NP NP np     Standing IR- cane up back 10 sec  x10 10 sec  x10 10 sec  x10 10 sec  x10 10 sec  x10 10 sec  x10 10 sec  x10 10 sec x10                               Table slides abduction 10 sec  x10 10 sec  x10 10 sec  x10 10 sec  x10 10 sec  x10 10 sec  x10 10 sec  x10 10 sec  x10     Serratus punch 3 sec, 3x10 3 sec, 3x10 3 sec, 2x20 3 sec, 2x20 1#, 3 sec, 2x10 1#, 3 sec, 3x10 1#, 3 sec, 3x10 1#  3 sec  3x10     Supine AAROM cane flexion 10 sec  x10 10 sec  x10 10 sec  x10 10 sec  x10 10 sec  x10 10 sec  x10 10 sec  x10 10 sec  x10     Side-lying ER 10x 10x 2x10 1# 2x10 1# 2x10 1# 2x10 1# 2x10 1#  2x10                                                                      Modalities             CP prn 10 min post 10 min post Pt defers 10 min post 10 min post 10 min post 10 min post 10 min post

## 2018-12-13 ENCOUNTER — APPOINTMENT (OUTPATIENT)
Dept: PHYSICAL THERAPY | Facility: CLINIC | Age: 58
End: 2018-12-13
Payer: COMMERCIAL

## 2018-12-14 ENCOUNTER — OFFICE VISIT (OUTPATIENT)
Dept: PHYSICAL THERAPY | Facility: CLINIC | Age: 58
End: 2018-12-14
Payer: COMMERCIAL

## 2018-12-14 DIAGNOSIS — M25.512 ACUTE PAIN OF LEFT SHOULDER: Primary | ICD-10-CM

## 2018-12-14 PROCEDURE — 97110 THERAPEUTIC EXERCISES: CPT

## 2018-12-14 PROCEDURE — 97140 MANUAL THERAPY 1/> REGIONS: CPT

## 2018-12-14 NOTE — PROGRESS NOTES
Daily Note     Today's date: 2018  Patient name: Diana Hernandez  : [age]  MRN: [id number]  Referring provider: Dorene Ahmadi MD  Dx:   Encounter Diagnosis     ICD-10-CM    1  Acute pain of left shoulder M25 512                   Subjective: Patient reports he had follow up appointment with MD yesterday to review his MRI results  He has a confirmed tear in his labrum and has elected to undergo surgical intervention, but would like to put this off until the spring  He would like to continue with therapy at a decreased frequency to alleviate some of the burden of his co-pays  I agree with this plan as he would benefit from maintaining strength in his shoulder and maximize ROM as much as possible prior to undergoing surgery  Objective: See treatment diary below  Assessment: Tolerated treatment well  Patient performs all exercises and stretches with good form/technique  He does report increased anterior discomfort "during most of my exercises" today, stating he feels more stiff than usual today  Patient demonstrated fatigue post treatment, exhibited good technique with therapeutic exercises and would benefit from continued PT      Plan: Decrease frequency of visits to 2x per month        Precautions: none    Daily Treatment Diary    Manuals 11/16 11/19 11/23 11/26 11/29 12/4 12/6 12/10 12/14    PROM AN AN AN AN AN AN AN RK AN    GHJ AP/Inf mobs AN AN AN AN AN AN AN np AN                              Exercise Diary              Pulleys 6 min, 10 sec hold 6 min, 10 sec hold 6 min, 10 sec hold 6 min, 10 sec hold 6 min, 10 sec hold 6 min, 10 sec hold 6 min, 10 sec hold 6 min  10 sec hold 6 min 10 sec hold    UBE 3 min each 2 min each 2 min each 2 min each 2 min each 2 min each 2 min each 2 min each 2 min each    Shoulder isometrics (flex, ext, IR, ER) 5"x10 ea 5 sec x10 ea 5 sec x10 ea 5 sec x10 ea NV NV 5 sec  x5 ea 5 sec   X 5 ea 5 sec x5 ea    ER door stretch 10"x10 10sec x10 10 sec  x10 10 sec  x10 10 sec  x10 10 sec  x10 10 sec  x10 10 sec  X 10 10 sec  x10                 TB rows/ext NP NV NP GTB 10x GTB 2x10 NV NV np NP    TB robbers NP NP NP NP NP NP NP np NP    TB lawn mowers NP NP NP NP NP NP NP np NP    Standing IR- cane up back 10 sec  x10 10 sec  x10 10 sec  x10 10 sec  x10 10 sec  x10 10 sec  x10 10 sec  x10 10 sec x10 10 sec  x10                              Table slides abduction 10 sec  x10 10 sec  x10 10 sec  x10 10 sec  x10 10 sec  x10 10 sec  x10 10 sec  x10 10 sec  x10 10 sec  x10    Serratus punch 3 sec, 3x10 3 sec, 3x10 3 sec, 2x20 3 sec, 2x20 1#, 3 sec, 2x10 1#, 3 sec, 3x10 1#, 3 sec, 3x10 1#  3 sec  3x10 1#, 3 sec 3x10    Supine AAROM cane flexion 10 sec  x10 10 sec  x10 10 sec  x10 10 sec  x10 10 sec  x10 10 sec  x10 10 sec  x10 10 sec  x10 10 sec  x10    Side-lying ER 10x 10x 2x10 1# 2x10 1# 2x10 1# 2x10 1# 2x10 1#  2x10 1# 2x10                                                                     Modalities             CP prn 10 min post 10 min post Pt defers 10 min post 10 min post 10 min post 10 min post 10 min post 10 min post

## 2018-12-18 DIAGNOSIS — G47.00 INSOMNIA, UNSPECIFIED TYPE: ICD-10-CM

## 2018-12-18 RX ORDER — ZOLPIDEM TARTRATE 12.5 MG/1
12.5 TABLET, FILM COATED, EXTENDED RELEASE ORAL
Qty: 30 TABLET | Refills: 0 | Status: SHIPPED | OUTPATIENT
Start: 2018-12-18 | End: 2019-01-14 | Stop reason: SDUPTHER

## 2018-12-18 NOTE — TELEPHONE ENCOUNTER
Patient called in requesting medication refill on his Ambien to be sent to his [facility]    Last ov was: 6/25/2018

## 2018-12-27 ENCOUNTER — OFFICE VISIT (OUTPATIENT)
Dept: PHYSICAL THERAPY | Facility: CLINIC | Age: 58
End: 2018-12-27
Payer: COMMERCIAL

## 2018-12-27 ENCOUNTER — TRANSCRIBE ORDERS (OUTPATIENT)
Dept: PHYSICAL THERAPY | Facility: CLINIC | Age: 58
End: 2018-12-27

## 2018-12-27 DIAGNOSIS — M25.512 ACUTE PAIN OF LEFT SHOULDER: Primary | ICD-10-CM

## 2018-12-27 PROCEDURE — 97110 THERAPEUTIC EXERCISES: CPT

## 2018-12-27 PROCEDURE — G8990 OTHER PT/OT CURRENT STATUS: HCPCS

## 2018-12-27 PROCEDURE — 97140 MANUAL THERAPY 1/> REGIONS: CPT

## 2018-12-27 PROCEDURE — G8991 OTHER PT/OT GOAL STATUS: HCPCS

## 2018-12-27 NOTE — LETTER
2018    Svitlana Deras MD  C/Ismael Maradiaga    Patient: Bria Reilly   YOB: 1960   Date of Visit: 2018     Encounter Diagnosis     ICD-10-CM    1  Acute pain of left shoulder M25 512        Dear Dr Alfonso Galloway:    Please review the attached Plan of Care from [facility] recent visit  Please verify that you agree therapy should continue by signing the attached document and sending it back to our office  If you have any questions or concerns, please don't hesitate to call  Sincerely,    Byron Hobbs, PT      Referring Provider:      I certify that I have read the below Plan of Care and certify the need for these services furnished under this plan of treatment while under my care  Svitlana Deras MD  [address] [place] [address] Road: [phone]          PT Re-Evaluation     Today's date: 2018  Patient name: Bria Reilly  :   MRN: [id number]  Referring provider: Brian Wan MD  Dx:   Encounter Diagnosis     ICD-10-CM    1  Acute pain of left shoulder M25 512                   Assessment  Assessment details: Bria Reilly has been compliant with attending PT and HEP since initial eval  Shandrarachael Figueroa has made improvements in objective data since IE but is still limited compared to normal  Shandrarachael Figueroa continues with above listed impairments, including pain which limits ROM and strength, and would benefit from additional skilled PT to address these deficits to return to PLOF    Impairments: abnormal or restricted ROM, activity intolerance, impaired physical strength, pain with function and poor posture   Functional limitations: difficulty completing work duties, difficulty with household chores and completing daily self care activities  Symptom irritability: moderateUnderstanding of Dx/Px/POC: good   Prognosis: good    Goals  STG  1) L shoulder ROM will improve 50% in 3 weeks  -Partially met  2) L shoulder strength will improve 1/2 grade in 3 weeks  -Met  LTG  1) Patient is independent in HEP within 6 weeks  -Met  2) L shoulder strength will be WFL and pain free in 6 weeks  -Not met  3) Patient completes overhead activity with 0-1/10 pain within 6 weeks  -Not met    Plan  Patient would benefit from: skilled physical therapy  Planned modality interventions: cryotherapy  Planned therapy interventions: joint mobilization, manual therapy, home exercise program, therapeutic exercise, therapeutic activities, stretching, strengthening, postural training, patient education, neuromuscular re-education and body mechanics training  Frequency: 2x month  Duration in weeks: 6  Treatment plan discussed with: patient        Subjective Evaluation    History of Present Illness  Mechanism of injury: Patient returns after two weeks  We have decreased PT frequency to twice per month until he has surgery to repair labral tear in L shoulder  He states he has been continuing with HEP as much as he can however maybe not as often as he should be  He tells me he has had trouble scheduling his surgery, as he has called the office multiple times and has been unable to speak directly with someone to schedule  He says this is frustrating and he plans on going directly to the office next week to schedule the surgery for about 2 months from now    Quality of life: good    Pain  Current pain ratin  At best pain ratin  At worst pain ratin  Quality: discomfort, dull ache and sharp  Relieving factors: change in position  Aggravating factors: overhead activity and lifting  Progression: no change    Treatments  Previous treatment: injection treatment  Patient Goals  Patient goals for therapy: decreased pain, increased motion, increased strength, independence with ADLs/IADLs and return to sport/leisure activities          Objective     Postural Observations  Seated posture: fair  Standing posture: fair  Correction of posture: makes symptoms better        Palpation   Left   Tenderness of the levator scapulae and upper trapezius  Tenderness     Left Shoulder   No tenderness in the [facility] joint, acromion, biceps tendon (proximal), bicipital groove, subacromial bursa and subscapularis tendon  Cervical/Thoracic Screen   Cervical range of motion within normal limits    Neurological Testing     Sensation     Shoulder   Left Shoulder   Intact: light touch    Right Shoulder   Intact: light touch    Active Range of Motion   Left Shoulder   Flexion: 140 degrees with pain  Abduction: 120 degrees with pain  External rotation 0°:  65 degrees   Internal rotation BTB: L1 with pain    Passive Range of Motion   Left Shoulder   Flexion: 145 degrees with pain  Abduction: 120 degrees with pain  External rotation 45°:  60 degrees   Internal rotation 0°:  [facility]    Joint Play   Left Shoulder  Joints within functional limits are the anterior capsule, posterior capsule and inferior capsule  Strength/Myotome Testing     Left Shoulder     Planes of Motion   Flexion: 4   Extension: 4   Abduction: 4   External rotation at 0°:  4   Internal rotation at 0°:  4+   Horizontal abduction: 4-     Isolated Muscles   Biceps: 4+   Lower trapezius: 4-   Middle trapezius: 4-   Rhomboids: 4-   Subscapularis: 4-   Triceps: 4+     Tests     Left Shoulder   Positive empty can, Hawkin's and passive horizontal adduction  Negative full can and bicep load          Flowsheet Rows      Most Recent Value   PT/OT G-Codes   Current Score  63   Projected Score  71   FOTO information reviewed  Yes   Assessment Type  Re-evaluation   G code set  Other PT/OT Primary   Other PT Primary Current Status ()  CJ   Other PT Primary Goal Status ()  CJ        Precautions: none    Daily Treatment Diary    Manuals 11/16 11/19 11/23 11/26 11/29 12/4 12/6 12/10 12/14 12/27   PROM AN AN AN AN AN AN AN RK AN AN   GHJ AP/Inf mobs AN AN AN AN AN AN AN np AN AN Exercise Diary              Pulleys 6 min, 10 sec hold 6 min, 10 sec hold 6 min, 10 sec hold 6 min, 10 sec hold 6 min, 10 sec hold 6 min, 10 sec hold 6 min, 10 sec hold 6 min  10 sec hold 6 min 10 sec hold 6 min 10 sec hold   UBE 3 min each 2 min each 2 min each 2 min each 2 min each 2 min each 2 min each 2 min each 2 min each 2 min each   Shoulder isometrics (flex, ext, IR, ER) 5"x10 ea 5 sec x10 ea 5 sec x10 ea 5 sec x10 ea NV NV 5 sec  x5 ea 5 sec   X 5 ea 5 sec x5 ea 5 sec x5 ea   ER door stretch 10"x10 10sec x10 10 sec  x10 10 sec  x10 10 sec  x10 10 sec  x10 10 sec  x10 10 sec  X 10 10 sec  x10 10 sec  x10                TB rows/ext NP NV NP GTB 10x GTB 2x10 NV NV np NP NP   TB robbers NP NP NP NP NP NP NP np NP NP   TB lawn mowers NP NP NP NP NP NP NP np NP NP   Standing IR- cane up back 10 sec  x10 10 sec  x10 10 sec  x10 10 sec  x10 10 sec  x10 10 sec  x10 10 sec  x10 10 sec x10 10 sec  x10 10 sec  x10                Table slides flexion          10 sec  x10   Table slides abduction 10 sec  x10 10 sec  x10 10 sec  x10 10 sec  x10 10 sec  x10 10 sec  x10 10 sec  x10 10 sec  x10 10 sec  x10 10 sec  x10   Serratus punch 3 sec, 3x10 3 sec, 3x10 3 sec, 2x20 3 sec, 2x20 1#, 3 sec, 2x10 1#, 3 sec, 3x10 1#, 3 sec, 3x10 1#  3 sec  3x10 1#, 3 sec 3x10 1# 3 sec 3x10   Supine AAROM cane flexion 10 sec  x10 10 sec  x10 10 sec  x10 10 sec  x10 10 sec  x10 10 sec  x10 10 sec  x10 10 sec  x10 10 sec  x10 10 sec  x10   Side-lying ER 10x 10x 2x10 1# 2x10 1# 2x10 1# 2x10 1# 2x10 1#  2x10 1# 2x10 1# 2x10                                                                    Modalities             CP prn 10 min post 10 min post Pt defers 10 min post 10 min post 10 min post 10 min post 10 min post 10 min post 10 min post

## 2018-12-27 NOTE — PROGRESS NOTES
PT Re-Evaluation     Today's date: 2018  Patient name: Derrick Samuel  :   MRN: [id number]  Referring provider: Eliot Soto MD  Dx:   Encounter Diagnosis     ICD-10-CM    1  Acute pain of left shoulder M25 512                   Assessment  Assessment details: Derrick Samuel has been compliant with attending PT and HEP since initial eval  Don Mackey has made improvements in objective data since IE but is still limited compared to normal  Don Mackey continues with above listed impairments, including pain which limits ROM and strength, and would benefit from additional skilled PT to address these deficits to return to PLOF  Impairments: abnormal or restricted ROM, activity intolerance, impaired physical strength, pain with function and poor posture   Functional limitations: difficulty completing work duties, difficulty with household chores and completing daily self care activities  Symptom irritability: moderateUnderstanding of Dx/Px/POC: good   Prognosis: good    Goals  STG  1) L shoulder ROM will improve 50% in 3 weeks  -Partially met  2) L shoulder strength will improve 1/2 grade in 3 weeks  -Met  LTG  1) Patient is independent in HEP within 6 weeks  -Met  2) L shoulder strength will be WFL and pain free in 6 weeks  -Not met  3) Patient completes overhead activity with 0-1/10 pain within 6 weeks  -Not met    Plan  Patient would benefit from: skilled physical therapy  Planned modality interventions: cryotherapy  Planned therapy interventions: joint mobilization, manual therapy, home exercise program, therapeutic exercise, therapeutic activities, stretching, strengthening, postural training, patient education, neuromuscular re-education and body mechanics training  Frequency: 2x month  Duration in weeks: 6  Treatment plan discussed with: patient        Subjective Evaluation    History of Present Illness  Mechanism of injury: Patient returns after two weeks   We have decreased PT frequency to twice per month until he has surgery to repair labral tear in L shoulder  He states he has been continuing with HEP as much as he can however maybe not as often as he should be  He tells me he has had trouble scheduling his surgery, as he has called the office multiple times and has been unable to speak directly with someone to schedule  He says this is frustrating and he plans on going directly to the office next week to schedule the surgery for about 2 months from now  Quality of life: good    Pain  Current pain ratin  At best pain ratin  At worst pain ratin  Quality: discomfort, dull ache and sharp  Relieving factors: change in position  Aggravating factors: overhead activity and lifting  Progression: no change    Treatments  Previous treatment: injection treatment  Patient Goals  Patient goals for therapy: decreased pain, increased motion, increased strength, independence with ADLs/IADLs and return to sport/leisure activities          Objective     Postural Observations  Seated posture: fair  Standing posture: fair  Correction of posture: makes symptoms better        Palpation   Left   Tenderness of the levator scapulae and upper trapezius  Tenderness     Left Shoulder   No tenderness in the [facility] joint, acromion, biceps tendon (proximal), bicipital groove, subacromial bursa and subscapularis tendon       Cervical/Thoracic Screen   Cervical range of motion within normal limits    Neurological Testing     Sensation     Shoulder   Left Shoulder   Intact: light touch    Right Shoulder   Intact: light touch    Active Range of Motion   Left Shoulder   Flexion: 140 degrees with pain  Abduction: 120 degrees with pain  External rotation 0°: 65 degrees   Internal rotation BTB: L1 with pain    Passive Range of Motion   Left Shoulder   Flexion: 145 degrees with pain  Abduction: 120 degrees with pain  External rotation 45°: 60 degrees   Internal rotation 0°: [facility]    Joint Play   Left Shoulder  Joints within functional limits are the anterior capsule, posterior capsule and inferior capsule  Strength/Myotome Testing     Left Shoulder     Planes of Motion   Flexion: 4   Extension: 4   Abduction: 4   External rotation at 0°: 4   Internal rotation at 0°: 4+   Horizontal abduction: 4-     Isolated Muscles   Biceps: 4+   Lower trapezius: 4-   Middle trapezius: 4-   Rhomboids: 4-   Subscapularis: 4-   Triceps: 4+     Tests     Left Shoulder   Positive empty can, Hawkin's and passive horizontal adduction  Negative full can and bicep load          Flowsheet Rows      Most Recent Value   PT/OT G-Codes   Current Score  63   Projected Score  71   FOTO information reviewed  Yes   Assessment Type  Re-evaluation   G code set  Other PT/OT Primary   Other PT Primary Current Status ()  CJ   Other PT Primary Goal Status ()  CJ        Precautions: none    Daily Treatment Diary    Manuals 11/16 11/19 11/23 11/26 11/29 12/4 12/6 12/10 12/14 12/27   PROM AN AN AN AN AN AN AN RK AN AN   GHJ AP/Inf mobs AN AN AN AN AN AN AN np AN AN                             Exercise Diary              Pulleys 6 min, 10 sec hold 6 min, 10 sec hold 6 min, 10 sec hold 6 min, 10 sec hold 6 min, 10 sec hold 6 min, 10 sec hold 6 min, 10 sec hold 6 min  10 sec hold 6 min 10 sec hold 6 min 10 sec hold   UBE 3 min each 2 min each 2 min each 2 min each 2 min each 2 min each 2 min each 2 min each 2 min each 2 min each   Shoulder isometrics (flex, ext, IR, ER) 5"x10 ea 5 sec x10 ea 5 sec x10 ea 5 sec x10 ea NV NV 5 sec  x5 ea 5 sec   X 5 ea 5 sec x5 ea 5 sec x5 ea   ER door stretch 10"x10 10sec x10 10 sec  x10 10 sec  x10 10 sec  x10 10 sec  x10 10 sec  x10 10 sec  X 10 10 sec  x10 10 sec  x10                TB rows/ext NP NV NP GTB 10x GTB 2x10 NV NV np NP NP   TB robbers NP NP NP NP NP NP NP np NP NP   TB lawn mowers NP NP NP NP NP NP NP np NP NP   Standing IR- cane up back 10 sec  x10 10 sec  x10 10 sec  x10 10 sec  x10 10 sec  x10 10 sec  x10 10 sec  x10 10 sec x10 10 sec  x10 10 sec  x10                Table slides flexion          10 sec  x10   Table slides abduction 10 sec  x10 10 sec  x10 10 sec  x10 10 sec  x10 10 sec  x10 10 sec  x10 10 sec  x10 10 sec  x10 10 sec  x10 10 sec  x10   Serratus punch 3 sec, 3x10 3 sec, 3x10 3 sec, 2x20 3 sec, 2x20 1#, 3 sec, 2x10 1#, 3 sec, 3x10 1#, 3 sec, 3x10 1#  3 sec  3x10 1#, 3 sec 3x10 1# 3 sec 3x10   Supine AAROM cane flexion 10 sec  x10 10 sec  x10 10 sec  x10 10 sec  x10 10 sec  x10 10 sec  x10 10 sec  x10 10 sec  x10 10 sec  x10 10 sec  x10   Side-lying ER 10x 10x 2x10 1# 2x10 1# 2x10 1# 2x10 1# 2x10 1#  2x10 1# 2x10 1# 2x10                                                                    Modalities             CP prn 10 min post 10 min post Pt defers 10 min post 10 min post 10 min post 10 min post 10 min post 10 min post 10 min post

## 2019-01-10 ENCOUNTER — OFFICE VISIT (OUTPATIENT)
Dept: PHYSICAL THERAPY | Facility: CLINIC | Age: 59
End: 2019-01-10
Payer: COMMERCIAL

## 2019-01-10 DIAGNOSIS — M25.512 ACUTE PAIN OF LEFT SHOULDER: Primary | ICD-10-CM

## 2019-01-10 PROCEDURE — 97110 THERAPEUTIC EXERCISES: CPT

## 2019-01-10 PROCEDURE — 97140 MANUAL THERAPY 1/> REGIONS: CPT

## 2019-01-10 NOTE — PROGRESS NOTES
Daily Note     Today's date: 1/10/2019  Patient name: Evangelina Ballard  : 1942  MRN: [id number]  Referring provider: Bella Kern MD  Dx:   Encounter Diagnosis     ICD-10-CM    1  Acute pain of left shoulder M25 512                 Subjective: Patient presents after 2 weeks of performing HEP independently  He states he believes he is not performing HEP as often as he should, and he feels a difference in his available ROM since last visit, since he is not having manual stretching or PROM to shoulder at home  He tells me his surgery for repair of labral tear is scheduled for 3/13/19, with his pre-op appointment on   Objective: See treatment diary below  Assessment: Tolerated treatment fair  Patient notes mild discomfort with completion of exercises, particularly strengthening and into end-range available ROM  Discomfort subsides after application of CP at end of session  Patient exhibited good technique with therapeutic exercises and would benefit from continued PT  Plan: Continue per plan of care         Precautions: none     Daily Treatment Diary     Manuals 1/10            PROM AN            GHJ AP/Inf mobs AN                                                        Exercise Diary                      Pulleys 6 min 10 sec hold            UBE 2 min each            Shoulder isometrics (flex, ext, IR, ER) 5 sec x10 ea            ER door stretch 10 sec  x10            Standing IR- cane up back 10 sec  x10                           Table slides flexion 10 sec  x10            Table slides abduction 10 sec  x10            Serratus punch 1# 3x10            Supine AAROM cane flexion 10 sec  x10            Side-lying ER 1# 2x10                                                                                                                                    Modalities                       CP post tx 10 min

## 2019-01-11 DIAGNOSIS — G47.00 INSOMNIA, UNSPECIFIED TYPE: ICD-10-CM

## 2019-01-11 RX ORDER — ZOLPIDEM TARTRATE 12.5 MG/1
12.5 TABLET, FILM COATED, EXTENDED RELEASE ORAL
Qty: 30 TABLET | Refills: 0 | OUTPATIENT
Start: 2019-01-11

## 2019-01-11 NOTE — TELEPHONE ENCOUNTER
Pt called for med refill for his zolpiedm 12 5mg to be sent to [facility]   Pt scheduled for ov 1/14/19

## 2019-01-14 ENCOUNTER — OFFICE VISIT (OUTPATIENT)
Dept: FAMILY MEDICINE CLINIC | Facility: CLINIC | Age: 59
End: 2019-01-14
Payer: COMMERCIAL

## 2019-01-14 VITALS
HEIGHT: 74 IN | DIASTOLIC BLOOD PRESSURE: 82 MMHG | SYSTOLIC BLOOD PRESSURE: 142 MMHG | OXYGEN SATURATION: 98 % | HEART RATE: 63 BPM | RESPIRATION RATE: 16 BRPM | BODY MASS INDEX: 26.69 KG/M2 | TEMPERATURE: 98.6 F | WEIGHT: 208 LBS

## 2019-01-14 DIAGNOSIS — G47.00 INSOMNIA, UNSPECIFIED TYPE: ICD-10-CM

## 2019-01-14 DIAGNOSIS — M24.112 LABRAL TEAR OF SHOULDER, DEGENERATIVE, LEFT: Primary | ICD-10-CM

## 2019-01-14 PROCEDURE — 3008F BODY MASS INDEX DOCD: CPT | Performed by: FAMILY MEDICINE

## 2019-01-14 PROCEDURE — 99213 OFFICE O/P EST LOW 20 MIN: CPT | Performed by: FAMILY MEDICINE

## 2019-01-14 RX ORDER — ZOLPIDEM TARTRATE 12.5 MG/1
12.5 TABLET, FILM COATED, EXTENDED RELEASE ORAL
Qty: 30 TABLET | Refills: 0 | Status: SHIPPED | OUTPATIENT
Start: 2019-01-14 | End: 2019-02-11 | Stop reason: SDUPTHER

## 2019-01-14 NOTE — PROGRESS NOTES
Assessment/Plan:    Labral tear of shoulder, degenerative, left  Getting surgery 3/13/19 by Dr Maldonado [place]       Diagnoses and all orders for this visit:    Insomnia, unspecified type  -     zolpidem (AMBIEN CR) 12 5 MG CR tablet; Take 1 tablet (12 5 mg total) by mouth daily at bedtime as needed for sleep          Subjective:      Patient ID: Vibha Coello is a 59 y o  male  F/u insomnia-stable on meds, only complaint is shoulder pain-having arthoscopic surgery 3/13        The following portions of the patient's history were reviewed and updated as appropriate: allergies, current medications, past family history, past medical history, past social history, past surgical history and problem list     Review of Systems   Constitutional: Negative for activity change, appetite change and unexpected weight change  Respiratory: Negative for shortness of breath  Cardiovascular: Negative for chest pain  Musculoskeletal: Positive for arthralgias  Shoulder pain   Neurological: Negative for headaches  Psychiatric/Behavioral: Positive for sleep disturbance  Negative for dysphoric mood  The patient is not nervous/anxious  Objective:      /82 (BP Location: Left arm, Patient Position: Sitting, Cuff Size: Large)   Pulse 63   Temp 98 6 °F (37 °C) (Temporal)   Resp 16   Ht 6' 2" (1 88 m)   Wt 94 3 kg (208 lb)   SpO2 98%   BMI 26 71 kg/m²          Physical Exam   Constitutional: He appears well-developed and well-nourished  Neck: No thyromegaly present  Cardiovascular: Normal rate, regular rhythm and normal heart sounds  Pulmonary/Chest: Breath sounds normal    Musculoskeletal: He exhibits no edema  Lymphadenopathy:     He has no cervical adenopathy  Vitals reviewed

## 2019-01-16 ENCOUNTER — OFFICE VISIT (OUTPATIENT)
Dept: PHYSICAL THERAPY | Facility: CLINIC | Age: 59
End: 2019-01-16
Payer: COMMERCIAL

## 2019-01-16 DIAGNOSIS — M25.512 ACUTE PAIN OF LEFT SHOULDER: Primary | ICD-10-CM

## 2019-01-16 PROCEDURE — 97110 THERAPEUTIC EXERCISES: CPT

## 2019-01-16 PROCEDURE — 97140 MANUAL THERAPY 1/> REGIONS: CPT

## 2019-01-16 NOTE — PROGRESS NOTES
Daily Note     Today's date: 2019  Patient name: Jamin Ford  : 1960  MRN: [id number]  Referring provider: Judith Jimenez MD  Dx:   Encounter Diagnosis     ICD-10-CM    1  Acute pain of left shoulder M25 512                 Subjective: Patient reported minimal changes overall in L shoulder symptoms  Eager to have surgery  Objective: See treatment diary below      Assessment: Program remains focused on increasing functional mobility and strength, as deficits remain, limiting his tolerance to work and functional activities  PROM remains guarded in all planes, mostly with ER where most limitations are present  Plan: Continue per plan of care  Next MD f/u scheduled for  for pre-op visit  L shoulder arthroscopy scheduled for 3/13        Precautions: none     Daily Treatment Diary  Manuals 1/10 1/16           PROM AN EV           GHJ AP/Inf mobs AN NP                                                       Exercise Diary                      Pulleys 6 min 10 sec hold 6 min, 10" hold           UBE 2 min each 2 min ea           Shoulder isometrics (flex, ext, IR, ER) 5 sec x10 ea 5"x10 ea           ER door stretch 10 sec  x10 10"x10           Standing IR- cane up back 10 sec  x10 10"x10                          Table slides flexion 10 sec  x10 10"x10           Table slides abduction 10 sec  x10 10  x10           Serratus punch 1# 3x10 1#  3x10           Supine AAROM cane flexion 10 sec  x10 1#  3x10           Side-lying ER 1# 2x10 1#  3x10                                                                                                                                   Modalities                       CP post tx 10 min 10 min

## 2019-01-24 ENCOUNTER — OFFICE VISIT (OUTPATIENT)
Dept: PHYSICAL THERAPY | Facility: CLINIC | Age: 59
End: 2019-01-24
Payer: COMMERCIAL

## 2019-01-24 DIAGNOSIS — M25.512 ACUTE PAIN OF LEFT SHOULDER: Primary | ICD-10-CM

## 2019-01-24 PROCEDURE — 97110 THERAPEUTIC EXERCISES: CPT

## 2019-01-24 PROCEDURE — 97140 MANUAL THERAPY 1/> REGIONS: CPT

## 2019-01-24 NOTE — PROGRESS NOTES
Daily Note     Today's date: 2019  Patient name: Kelvin Thorpe  :   MRN: [id number]  Referring provider: Olivier Arias MD  Dx:   Encounter Diagnosis     ICD-10-CM    1  Acute pain of left shoulder M25 512                 Subjective: Patient reports L shoulder feels about the same, stating "it doesn't feel any worse" which is encouraging to him  Objective: See treatment diary below  Assessment: Patient progresses through exercises well with no increase in discomfort during session  PROM is still limited, with patient guarding due to anticipated discomfort  Flexion and abduction limited to 90 degrees, and ER limited to 40 degrees all due to pain  Patient remains limited with functional activities and work duties due to pain and ROM deficits  Plan: Continue per plan of care  Next MD f/u scheduled for  for pre-op visit  L shoulder arthroscopy scheduled for 3/13        Precautions: none     Daily Treatment Diary    Manuals 1/10 1/16 1/24          PROM AN EV AN          GHJ AP/Inf mobs AN NP AN                                                      Exercise Diary                      Pulleys 6 min 10 sec hold 6 min, 10" hold 6 min, 10" hold          UBE 2 min each 2 min ea 2 min ea          Shoulder isometrics (flex, ext, IR, ER) 5 sec x10 ea 5"x10 ea 5 sec x10          ER door stretch 10 sec  x10 10"x10 10 sec  x10          Standing IR- cane up back 10 sec  x10 10"x10 10 sec  x10                         Table slides flexion 10 sec  x10 10"x10 10 sec  x10          Table slides abduction 10 sec  x10 10  x10 10 sec  x10          Serratus punch 1# 3x10 1#  3x10 1# 3x10          Supine AAROM cane flexion 10 sec  x10 1#  3x10 1# 10 sec x10          Side-lying ER 1# 2x10 1#  3x10 1# 3x10                                                                                                                                  Modalities                       CP post tx 10 min 10 min 10 min

## 2019-01-31 ENCOUNTER — OFFICE VISIT (OUTPATIENT)
Dept: PHYSICAL THERAPY | Facility: CLINIC | Age: 59
End: 2019-01-31
Payer: COMMERCIAL

## 2019-01-31 DIAGNOSIS — M25.512 ACUTE PAIN OF LEFT SHOULDER: Primary | ICD-10-CM

## 2019-01-31 PROCEDURE — 97110 THERAPEUTIC EXERCISES: CPT

## 2019-01-31 PROCEDURE — 97140 MANUAL THERAPY 1/> REGIONS: CPT

## 2019-01-31 NOTE — PROGRESS NOTES
Daily Note     Today's date: 2019  Patient name: Renan Dye  : [birth year]  MRN: [id number]  Referring provider: Nilam Dobbs MD  Dx:   Encounter Diagnosis     ICD-10-CM    1  Acute pain of left shoulder M25 512                 Subjective: Patient tells me L shoulder still "just feels sore" all the time  He states it feels slightly better than it did last week, but while he was sleeping last night he awoke with a jerk and felt immediate increased discomfort, which is causing soreness now  Objective: See treatment diary below  Assessment: Patient tolerates treatment fair, having most difficulty with IR stretch behind back and continued tightness during manual PROM  Patient continues to guard for fear of shoulder pain at end available PROM, limiting range  He is most limited with abduction and ER, focusing on these motions to improve function prior to surgery in 6 weeks  Plan: Continue per plan of care  Next MD f/u scheduled for  for pre-op visit  L shoulder arthroscopy scheduled for 3/13        Precautions: none     Daily Treatment Diary    Manuals 1/10 1/16 1/24 1/31         PROM AN EV AN AN         GHJ AP/Inf mobs AN NP AN AN                                                     Exercise Diary                      Pulleys 6 min 10 sec hold 6 min, 10" hold 6 min, 10" hold 6 min, 10" hold         UBE 2 min each 2 min ea 2 min ea 2 min ea         Shoulder isometrics (flex, ext, IR, ER) 5 sec x10 ea 5"x10 ea 5 sec x10 5 sec x10         ER door stretch 10 sec  x10 10"x10 10 sec  x10 10 sec  x10         Standing IR- cane up back 10 sec  x10 10"x10 10 sec  x10 10 sec  x10                        Table slides flexion 10 sec  x10 10"x10 10 sec  x10 10 sec  x10         Table slides abduction 10 sec  x10 10  x10 10 sec  x10 10 sec  x10         Serratus punch 1# 3x10 1#  3x10 1# 3x10 1# 3x10         Supine AAROM cane flexion 10 sec  x10 1#  3x10 1# 10 sec x10 1# 10 sec  x10         Side-lying ER 1# 2x10 1#  3x10 1# 3x10 1# 3x10         Sleeper stretch       10 sec  x10                                                                                                               Modalities                       CP post tx 10 min 10 min 10 min 10 min

## 2019-02-07 DIAGNOSIS — G47.00 INSOMNIA, UNSPECIFIED TYPE: ICD-10-CM

## 2019-02-07 RX ORDER — ZOLPIDEM TARTRATE 12.5 MG/1
12.5 TABLET, FILM COATED, EXTENDED RELEASE ORAL
Qty: 30 TABLET | Refills: 0 | OUTPATIENT
Start: 2019-02-07

## 2019-02-07 NOTE — TELEPHONE ENCOUNTER
Pt left voicemail requesting a refill on his Ambien 12 5mg to be sent to his [facility]    Last ov was: 1/14/2019

## 2019-02-08 ENCOUNTER — OFFICE VISIT (OUTPATIENT)
Dept: URGENT CARE | Facility: CLINIC | Age: 59
End: 2019-02-08
Payer: COMMERCIAL

## 2019-02-08 VITALS
DIASTOLIC BLOOD PRESSURE: 88 MMHG | HEIGHT: 75 IN | HEART RATE: 60 BPM | BODY MASS INDEX: 26.49 KG/M2 | TEMPERATURE: 97.8 F | WEIGHT: 213 LBS | RESPIRATION RATE: 16 BRPM | SYSTOLIC BLOOD PRESSURE: 144 MMHG

## 2019-02-08 DIAGNOSIS — J06.9 VIRAL UPPER RESPIRATORY TRACT INFECTION: Primary | ICD-10-CM

## 2019-02-08 LAB — S PYO AG THROAT QL: NEGATIVE

## 2019-02-08 PROCEDURE — 87070 CULTURE OTHR SPECIMN AEROBIC: CPT | Performed by: PHYSICIAN ASSISTANT

## 2019-02-08 PROCEDURE — 99213 OFFICE O/P EST LOW 20 MIN: CPT | Performed by: PHYSICIAN ASSISTANT

## 2019-02-08 NOTE — PROGRESS NOTES
NAME: El Van is a 59 y o  male  : 1960    MRN: [id number]      Assessment and Plan   Viral upper respiratory tract infection [J06 9]  1  Viral upper respiratory tract infection         Rapid negative, culture sent    Patient Instructions   Patient Instructions   Ibuprofen six hundred eight 100 mg every 6-8 hours with food  Continue salt water gargles  Flonase  Increased fluids  If no improvement in 3-4 days follow-up with PCP  If anything changes or worsens follow-up sooner  Call in 2 days for culture results    Proceed to ER if symptoms worsen  Chief Complaint     Chief Complaint   Patient presents with    Sore Throat     Reports sore throat started 2 days ago  Difficulty swallowing and burning and on and off losing voice  Pt took Dayquil with no improvement  History of Present Illness   Patient with past medical history of GERD presents complaining of 2 day history of sore throat  He states that he also has been having congestion, cough, runny nose and ear pressure  He denies sinus pain or pressure, chest tightness, chest congestion, shortness of breath, chest pain, dyspnea, fever, chills  He reports he has been taking DayQuil and NyQuil with not much improvement  He has also been using saltwater gargles  Review of Systems   Review of Systems   Constitutional: Negative for chills and fever  HENT: Positive for congestion, ear pain, rhinorrhea and sore throat  Negative for sinus pain and sinus pressure  Respiratory: Positive for cough  Negative for chest tightness, shortness of breath and wheezing  Cardiovascular: Negative for chest pain and palpitations           Current Medications       Current Outpatient Prescriptions:     dicyclomine (BENTYL) 20 mg tablet, Take 1 tablet by mouth every 6 (six) hours as needed, Disp: , Rfl:     Esomeprazole Magnesium (NEXIUM 24HR) 20 MG TBEC, Take 1 tablet by mouth every 6 (six) hours, Disp: , Rfl:     glucosamine-chondroitin 500-400 MG tablet, , Disp: , Rfl:     zolpidem (AMBIEN CR) 12 5 MG CR tablet, Take 1 tablet (12 5 mg total) by mouth daily at bedtime as needed for sleep, Disp: 30 tablet, Rfl: 0    oxaprozin (DAYPRO) 600 MG tablet, Take 2 tablets (1,200 mg total) by mouth daily (Patient not taking: Reported on 2/8/2019 ), Disp: 30 tablet, Rfl: 2    Current Allergies     Allergies as of 02/08/2019    (No Known Allergies)              Past Medical History:   Diagnosis Date    Difficulty sleeping     GERD (gastroesophageal reflux disease)     Impotence, organic     Insomnia        Past Surgical History:   Procedure Laterality Date    ADENOIDECTOMY      BREAST SURGERY      lumpectomy    TONSILLECTOMY         Family History   Problem Relation Age of Onset    Lung cancer Mother     Heart disease Father     Colon cancer Father          Medications have been verified  The following portions of the patient's history were reviewed and updated as appropriate: allergies, current medications, past family history, past medical history, past social history, past surgical history and problem list     Objective   /88   Pulse 60   Temp 97 8 °F (36 6 °C)   Resp 16   Ht 6' 3" (1 905 m)   Wt 96 6 kg (213 lb)   BMI 26 62 kg/m²      Physical Exam     Physical Exam   Constitutional: He appears well-developed and well-nourished  No distress  HENT:   TMs are mildly erythematous bilaterally without injection or bulging  Normal cone of light  Nasal mucosa is mildly erythematous without edema  Clear rhinorrhea present  Posterior oropharynx is diffusely erythematous with cobblestoning  No tonsillar hypertrophy or exudates  +PND   Cardiovascular: Normal rate, regular rhythm and normal heart sounds  Pulmonary/Chest: Effort normal and breath sounds normal  No respiratory distress  He has no wheezes  He has no rales  Lymphadenopathy:     He has no cervical adenopathy

## 2019-02-08 NOTE — TELEPHONE ENCOUNTER
Per patient he is aware he is a week early and he does not need it know  Patient stated he always calls 2 to 3 days before he is due and it always takes forever for him to get his rx filled and that is the reason why he called earlier this time

## 2019-02-08 NOTE — PATIENT INSTRUCTIONS
Ibuprofen six hundred eight 100 mg every 6-8 hours with food  Continue salt water gargles  Flonase  Increased fluids  If no improvement in 3-4 days follow-up with PCP  If anything changes or worsens follow-up sooner  Call in 2 days for culture results

## 2019-02-10 LAB — BACTERIA THROAT CULT: NORMAL

## 2019-02-11 DIAGNOSIS — G47.00 INSOMNIA, UNSPECIFIED TYPE: ICD-10-CM

## 2019-02-11 RX ORDER — ZOLPIDEM TARTRATE 12.5 MG/1
12.5 TABLET, FILM COATED, EXTENDED RELEASE ORAL
Qty: 30 TABLET | Refills: 2 | Status: SHIPPED | OUTPATIENT
Start: 2019-02-11 | End: 2019-05-09 | Stop reason: SDUPTHER

## 2019-03-15 ENCOUNTER — EVALUATION (OUTPATIENT)
Dept: PHYSICAL THERAPY | Facility: CLINIC | Age: 59
End: 2019-03-15
Payer: COMMERCIAL

## 2019-03-15 ENCOUNTER — TRANSCRIBE ORDERS (OUTPATIENT)
Dept: PHYSICAL THERAPY | Facility: CLINIC | Age: 59
End: 2019-03-15

## 2019-03-15 DIAGNOSIS — M25.512 ACUTE PAIN OF LEFT SHOULDER: Primary | ICD-10-CM

## 2019-03-15 DIAGNOSIS — M25.512 LEFT SHOULDER PAIN, UNSPECIFIED CHRONICITY: Primary | ICD-10-CM

## 2019-03-15 DIAGNOSIS — Z98.890 S/P LEFT ROTATOR CUFF REPAIR: ICD-10-CM

## 2019-03-15 PROCEDURE — 97161 PT EVAL LOW COMPLEX 20 MIN: CPT

## 2019-03-15 RX ORDER — OXYCODONE AND ACETAMINOPHEN 10; 325 MG/1; MG/1
1 TABLET ORAL EVERY 4 HOURS PRN
COMMUNITY
End: 2020-02-19 | Stop reason: CLARIF

## 2019-03-15 RX ORDER — METHOCARBAMOL 500 MG/1
500 TABLET, FILM COATED ORAL 4 TIMES DAILY
COMMUNITY
End: 2020-02-19 | Stop reason: CLARIF

## 2019-03-15 NOTE — LETTER
March 15, 2019    Brenda Hernandez PA-C  [facility]    Patient: Gabriel Dawson   YOB: 1960   Date of Visit: 3/15/2019     Encounter Diagnosis     ICD-10-CM    1  Acute pain of left shoulder M25 512    2  S/P left rotator cuff repair Z98 [id number]        Dear Dr Georges Cleaves:    Please review the attached Plan of Care from [facility] recent visit  Please verify that you agree therapy should continue by signing the attached document and sending it back to our office  If you have any questions or concerns, please don't hesitate to call  Sincerely,    Conrad Hobbs, PT      Referring Provider:      I certify that I have read the below Plan of Care and certify the need for these services furnished under this plan of treatment while under my care  Brenda Hernandez PA-C  [address]  [facility]: [phone]          PT Evaluation     Today's date: 3/15/2019  Patient name: Gabriel Dawson  : 1960  MRN: [id number]  Referring provider: Augustin Carpenter  Dx:   Encounter Diagnosis     ICD-10-CM    1  Acute pain of left shoulder M25 512    2  S/P left rotator cuff repair Z98 [id number]        Start Time: 1275  Stop Time: 0945  Total time in clinic (min): 40 minutes    Assessment  Assessment details: Gabriel Dawson is a 59 y o  male presenting to PT with pain, decreased shoulder range of motion, decreased strength, and decreased tolerance to activity s/p L RTC repair 2 days ago  Patient would benefit from skilled PT services to address these impairments and to maximize function in order to improve quality of life   Thank you for the referral   Impairments: abnormal muscle tone, abnormal or restricted ROM, activity intolerance, impaired physical strength, lacks appropriate home exercise program, pain with function and poor posture   Functional limitations: unable to complete work activities, difficulty completing household chores and self care ADLs, unable to drive, difficulty sleeping  Symptom irritability: moderateUnderstanding of Dx/Px/POC: good   Prognosis: good    Goals  STG  1) L shoulder PROM will improve 50% in 4 weeks  2) Periscapular strength will improve 1/2 grade in 4 weeks  LTG  1) Patient is independent in HEP within 8 weeks  2) L shoulder PROM will be full and pain-free in 8 weeks  3) L shoulder AROM will be at least half of normal in 8 weeks  4) Patient will be independent in household chores with no increased pain in 8 weeks  5) L shoulder strength will be at least 3/5 in 10 weeks  Plan  Patient would benefit from: skilled physical therapy  Planned modality interventions: cryotherapy  Planned therapy interventions: joint mobilization, manual therapy, home exercise program, therapeutic exercise, therapeutic activities, stretching, strengthening, postural training, patient education, neuromuscular re-education, abdominal trunk stabilization and body mechanics training  Frequency: 2x week  Duration in weeks: 12  Treatment plan discussed with: patient        Subjective Evaluation    History of Present Illness  Date of surgery: 3/13/2019  Mechanism of injury: surgery  Mechanism of injury: Patient presents 2 days s/p L RTC repair on 3/13  He states his nerve block is still intact and he does not have any pain  He presents with sling and pillow  He states his first follow up with MD is 3/28  He has been doing pendulums and icing several times per day    Quality of life: good    Pain  No pain reported  Relieving factors: ice  Progression: no change    Hand dominance: right    Patient Goals  Patient goals for therapy: decreased pain, decreased edema, increased motion, increased strength, independence with ADLs/IADLs and return to work  Patient goal: get back to work and doing all of my normal activities        Objective     Postural Observations  Seated posture: poor  Standing posture: fair  Correction of posture: makes symptoms better        Observations   Left Shoulder   Positive for edema and incision  Negative for drainage  Palpation   Left   Tenderness of the anterior deltoid, biceps, middle deltoid, posterior deltoid and upper trapezius  Tenderness     Left Shoulder   Tenderness in the [facility] joint and acromion  Cervical/Thoracic Screen   Cervical range of motion within normal limits    Neurological Testing     Additional Neurological Details  Normal UE neuro screen    Passive Range of Motion     Additional Passive Range of Motion Details  L shoulder flexion (scapular plane):  Passive to 50 degrees (limited due to pain)    Strength/Myotome Testing     Additional Strength Details  Not tested    FOTO score: 25  Expected at discharge: 46      Precautions:  Follow post-op protocol    Daily Treatment Diary     Manuals             PROM L shoulder scapular plane                                                    Exercise Diary              Pendulums             Wrist AROM             Elbow ARM             Scap retractions             Shoulder shrug             Towel gripping             UT/LS stretch                                                                                                                                                                                                                Modalities             CP prn

## 2019-03-15 NOTE — PROGRESS NOTES
PT Evaluation     Today's date: 3/15/2019  Patient name: Omar Eaton  : 1960  MRN: [id number]  Referring provider: Sen Carcamo  Dx:   Encounter Diagnosis     ICD-10-CM    1  Acute pain of left shoulder M25 512    2  S/P left rotator cuff repair [id number] [id number]        Start Time: 7749  Stop Time: 0945  Total time in clinic (min): 40 minutes    Assessment  Assessment details: Omar Eaton is a 59 y o  male presenting to PT with pain, decreased shoulder range of motion, decreased strength, and decreased tolerance to activity s/p L RTC repair 2 days ago  Patient would benefit from skilled PT services to address these impairments and to maximize function in order to improve quality of life  Thank you for the referral   Impairments: abnormal muscle tone, abnormal or restricted ROM, activity intolerance, impaired physical strength, lacks appropriate home exercise program, pain with function and poor posture   Functional limitations: unable to complete work activities, difficulty completing household chores and self care ADLs, unable to drive, difficulty sleeping  Symptom irritability: moderateUnderstanding of Dx/Px/POC: good   Prognosis: good    Goals  STG  1) L shoulder PROM will improve 50% in 4 weeks  2) Periscapular strength will improve 1/2 grade in 4 weeks  LTG  1) Patient is independent in HEP within 8 weeks  2) L shoulder PROM will be full and pain-free in 8 weeks  3) L shoulder AROM will be at least half of normal in 8 weeks  4) Patient will be independent in household chores with no increased pain in 8 weeks  5) L shoulder strength will be at least 3/5 in 10 weeks      Plan  Patient would benefit from: skilled physical therapy  Planned modality interventions: cryotherapy  Planned therapy interventions: joint mobilization, manual therapy, home exercise program, therapeutic exercise, therapeutic activities, stretching, strengthening, postural training, patient education, neuromuscular re-education, abdominal trunk stabilization and body mechanics training  Frequency: 2x week  Duration in weeks: 12  Treatment plan discussed with: patient        Subjective Evaluation    History of Present Illness  Date of surgery: 3/13/2019  Mechanism of injury: surgery  Mechanism of injury: Patient presents 2 days s/p L RTC repair on 3/13  He states his nerve block is still intact and he does not have any pain  He presents with sling and pillow  He states his first follow up with MD is 3/28  He has been doing pendulums and icing several times per day  Quality of life: good    Pain  No pain reported  Relieving factors: ice  Progression: no change    Hand dominance: right    Patient Goals  Patient goals for therapy: decreased pain, decreased edema, increased motion, increased strength, independence with ADLs/IADLs and return to work  Patient goal: get back to work and doing all of my normal activities        Objective     Postural Observations  Seated posture: poor  Standing posture: fair  Correction of posture: makes symptoms better        Observations   Left Shoulder   Positive for edema and incision  Negative for drainage  Palpation   Left   Tenderness of the anterior deltoid, biceps, middle deltoid, posterior deltoid and upper trapezius  Tenderness     Left Shoulder   Tenderness in the [facility] joint and acromion  Cervical/Thoracic Screen   Cervical range of motion within normal limits    Neurological Testing     Additional Neurological Details  Normal UE neuro screen    Passive Range of Motion     Additional Passive Range of Motion Details  L shoulder flexion (scapular plane):  Passive to 50 degrees (limited due to pain)    Strength/Myotome Testing     Additional Strength Details  Not tested    FOTO score: 25  Expected at discharge: 46      Precautions:  Follow post-op protocol    Daily Treatment Diary     Manuals             PROM L shoulder scapular plane Exercise Diary              Pendulums             Wrist AROM             Elbow ARM             Scap retractions             Shoulder shrug             Towel gripping             UT/LS stretch                                                                                                                                                                                                                Modalities             CP prn

## 2019-03-19 ENCOUNTER — OFFICE VISIT (OUTPATIENT)
Dept: PHYSICAL THERAPY | Facility: CLINIC | Age: 59
End: 2019-03-19
Payer: COMMERCIAL

## 2019-03-19 DIAGNOSIS — M25.512 ACUTE PAIN OF LEFT SHOULDER: Primary | ICD-10-CM

## 2019-03-19 DIAGNOSIS — Z98.890 S/P LEFT ROTATOR CUFF REPAIR: ICD-10-CM

## 2019-03-19 PROCEDURE — 97140 MANUAL THERAPY 1/> REGIONS: CPT | Performed by: PHYSICAL THERAPIST

## 2019-03-19 PROCEDURE — 97110 THERAPEUTIC EXERCISES: CPT | Performed by: PHYSICAL THERAPIST

## 2019-03-19 NOTE — PROGRESS NOTES
Daily Note     Today's date: 3/19/2019  Patient name: Gabriel Dawson  : 1960  MRN: [id number]  Referring provider: Augustin Carpenter  Dx:   Encounter Diagnosis     ICD-10-CM    1  Acute pain of left shoulder M25 512    2  S/P left rotator cuff repair Z98 890        Start Time: 06  Stop Time: 0709  Total time in clinic (min): 45 minutes    Subjective:  Pain has worsened since nerve block wore off  Pain level 6/10 at worst    Has been taking tylenol during the pain, percocet at night  Objective: See treatment diary below  PROM L shoulder:  FF: 90 degrees  Scaption: 90 degrees  ER:  20 degrees @ 45 ABD in plane of scaption  IR: chest      Assessment: Guards heavily with PROM  Advised patient against any active use of L shoulder (elbow ok)    Plan: Progress treament per protocol            Precautions:  Follow post-op protocol     Daily Treatment Diary      Manuals  3/19                     PROM L shoulder scapular plane RG                                                                                             Exercise Diary                        Pendulums 10 cw/ccw x 3                     Wrist AROM  Flexion/Extension   x 20                     Elbow AROM  2 x 10                     Scap retractions  2 x 10                     Shoulder shrug  2 x 10                     Towel gripping  x 2 min                     UT/LS stretch  NV                                                                                                                                                                                                                                                                                                                                                                                             Modalities                       CP prn  10 min

## 2019-03-21 ENCOUNTER — OFFICE VISIT (OUTPATIENT)
Dept: PHYSICAL THERAPY | Facility: CLINIC | Age: 59
End: 2019-03-21
Payer: COMMERCIAL

## 2019-03-21 DIAGNOSIS — Z98.890 S/P LEFT ROTATOR CUFF REPAIR: ICD-10-CM

## 2019-03-21 DIAGNOSIS — M25.512 ACUTE PAIN OF LEFT SHOULDER: Primary | ICD-10-CM

## 2019-03-21 PROCEDURE — 97140 MANUAL THERAPY 1/> REGIONS: CPT

## 2019-03-21 PROCEDURE — 97110 THERAPEUTIC EXERCISES: CPT

## 2019-03-21 NOTE — PROGRESS NOTES
Daily Note     Today's date: 3/21/2019  Patient name: Wilbert Rothman  : 1960  MRN: [id number]  Referring provider: Yamilex Velasquez  Dx:   Encounter Diagnosis     ICD-10-CM    1  Acute pain of left shoulder M25 512    2  S/P left rotator cuff repair Z98 890        Start Time: 8397  Stop Time: 6404  Total time in clinic (min): 47 minutes    Subjective: Patient reports he felt "very well" after last session  Reports some soreness/stiffness in shoulder today, blaming the weather today for stiffness  Objective: See treatment diary below  Assessment: Patient continues to guard during PROM, limiting available ROM and ultimately causing increased discomfort  Continue focus on improving PROM and no A/ARROM with LUE until at least  or follow up with MD for clearance  Plan: Progress treament per protocol         Precautions:  Follow post-op protocol     Daily Treatment Diary     Manuals  3/19  3/21                   PROM L shoulder scapular plane RG  AN                                                                                           Exercise Diary                        Pendulums 10x3 ea 30 each                   Wrist AROM Flexion/Ext x 20 2x10                   Elbow AROM  2 x 10 3x10                   Scap retractions  2 x 10 2x10                   Shoulder shrug  2 x 10 2x10                   Towel gripping  x 2 min Digi flex  Red x 2 min                   UT/LS stretch  NV 30 sec  x3                                                                                                                                                                                                                                                                                                                                                                                           Modalities                       CP prn  10 min 10 min

## 2019-03-26 ENCOUNTER — OFFICE VISIT (OUTPATIENT)
Dept: PHYSICAL THERAPY | Facility: CLINIC | Age: 59
End: 2019-03-26
Payer: COMMERCIAL

## 2019-03-26 DIAGNOSIS — M25.512 ACUTE PAIN OF LEFT SHOULDER: Primary | ICD-10-CM

## 2019-03-26 DIAGNOSIS — Z98.890 S/P LEFT ROTATOR CUFF REPAIR: ICD-10-CM

## 2019-03-26 PROCEDURE — 97110 THERAPEUTIC EXERCISES: CPT

## 2019-03-26 PROCEDURE — 97140 MANUAL THERAPY 1/> REGIONS: CPT

## 2019-03-26 NOTE — PROGRESS NOTES
Daily Note     Today's date: 3/26/2019  Patient name: Cliff Hernandez  : 1960  MRN: [id number]  Referring provider: Dannie Cummins  Dx:   Encounter Diagnosis     ICD-10-CM    1  Acute pain of left shoulder M25 512    2  S/P left rotator cuff repair Z98 890                   Subjective: Patient will be 2 weeks post-op tomorrow (3/27)  He reported overall is having an easier time with current rehab vs previous rehab on R shoulder  He feels as though he is able to complete daily activities with more ease, such as dressing and bathing with less discomfort  Sleep tolerance improving, remaining on pain medication to management symptoms  Objective: See treatment diary below      Assessment: Good understanding of current exercise program per protocol requirements  Significant deficits with PROM, guarding into all planes, having muscle spasms in posterior shoulder limiting tolerance  Plan: Continue per plan of care  Progress treament per protocol  Next MD f/u scheduled for 3/28  Precautions:  Follow post-op protocol     Daily Treatment Diary   Manuals  3/19  3/21  3/26                 PROM L shoulder scapular plane RG  AN EV                                                                                         Exercise Diary                        Pendulums 10x3 ea 30 each  x30 ea                 Wrist AROM Flexion/Ext x 20 2x10  2x10 ea                 Elbow AROM  2 x 10 3x10  3x10 ea                 Scap retractions  2 x 10 2x10  2x10                 Shoulder shrug  2 x 10 2x10  2x10                 Digi flex  x 2 min Digi flex  Red x 2 min  Red  2 min                 UT/LS stretch  NV 30 sec  x3  30"x3                                                                                                                                                                                                                                                                                                                                                                                         Modalities                       CP prn  10 min 10 min  10 min post                                           10 min of exercise not directly supervised

## 2019-03-29 ENCOUNTER — OFFICE VISIT (OUTPATIENT)
Dept: PHYSICAL THERAPY | Facility: CLINIC | Age: 59
End: 2019-03-29
Payer: COMMERCIAL

## 2019-03-29 DIAGNOSIS — M25.512 ACUTE PAIN OF LEFT SHOULDER: Primary | ICD-10-CM

## 2019-03-29 DIAGNOSIS — Z98.890 S/P LEFT ROTATOR CUFF REPAIR: ICD-10-CM

## 2019-03-29 PROCEDURE — 97110 THERAPEUTIC EXERCISES: CPT

## 2019-03-29 PROCEDURE — 97140 MANUAL THERAPY 1/> REGIONS: CPT

## 2019-03-29 NOTE — PROGRESS NOTES
Daily Note     Today's date: 3/29/2019  Patient name: Cynthia Minaya  : 1960  MRN: [id number]  Referring provider: Kristen Cerna  Dx:   Encounter Diagnosis     ICD-10-CM    1  Acute pain of left shoulder M25 512    2  S/P left rotator cuff repair Z98 890                   Subjective: Patient is currently 2 weeks, 2 days post-op  Had a f/u with MD following previous treatment, per patient, shoulder be able to discharge use of sling after the next four weeks  Now just using Tylenol with most pain occurring later in the evening  Objective: See treatment diary below      Assessment: Exercises completed are compliant with current post-op protocol  Demonstrated improved ROM in scapular plane, however continues to remain significantly limited, achieving roughly 50-60*, ER remaining most restricted  Plan: Continue per plan of care  Progress treament per protocol  Next MD f/u scheduled for   Precautions:  Follow post-op protocol     Daily Treatment Diary   Manuals  3/19  3/21  3/26  3/29               PROM L shoulder scapular plane RG  AN EV  EV                                                                                       Exercise Diary                        Pendulums 10x3 ea 30 each  x30 ea  x30 ea               Wrist AROM Flexion/Ext x 20 2x10  2x10 ea  2x10 ea               Elbow AROM  2 x 10 3x10  3x10 ea  3x10 ea               Scap retractions  2 x 10 2x10  2x10  2x10               Shoulder shrug  2 x 10 2x10  2x10  2x10               Digi flex  x 2 min Digi flex  Red x 2 min  Red  2 min  Red  2 min               UT/LS stretch  NV 30 sec  x3  30"x3  30"x3                                                                                                                                                                                                                                                                                                                                                                                       Modalities                       CP prn  10 min 10 min  10 min post  10 min post

## 2019-04-01 ENCOUNTER — OFFICE VISIT (OUTPATIENT)
Dept: PHYSICAL THERAPY | Facility: CLINIC | Age: 59
End: 2019-04-01
Payer: COMMERCIAL

## 2019-04-01 DIAGNOSIS — Z98.890 S/P LEFT ROTATOR CUFF REPAIR: ICD-10-CM

## 2019-04-01 DIAGNOSIS — M25.512 ACUTE PAIN OF LEFT SHOULDER: Primary | ICD-10-CM

## 2019-04-01 PROCEDURE — 97110 THERAPEUTIC EXERCISES: CPT

## 2019-04-01 PROCEDURE — 97140 MANUAL THERAPY 1/> REGIONS: CPT

## 2019-04-02 ENCOUNTER — APPOINTMENT (OUTPATIENT)
Dept: PHYSICAL THERAPY | Facility: CLINIC | Age: 59
End: 2019-04-02
Payer: COMMERCIAL

## 2019-04-04 ENCOUNTER — OFFICE VISIT (OUTPATIENT)
Dept: PHYSICAL THERAPY | Facility: CLINIC | Age: 59
End: 2019-04-04
Payer: COMMERCIAL

## 2019-04-04 DIAGNOSIS — Z98.890 S/P LEFT ROTATOR CUFF REPAIR: ICD-10-CM

## 2019-04-04 DIAGNOSIS — M25.512 ACUTE PAIN OF LEFT SHOULDER: Primary | ICD-10-CM

## 2019-04-04 PROCEDURE — 97140 MANUAL THERAPY 1/> REGIONS: CPT

## 2019-04-04 PROCEDURE — 97110 THERAPEUTIC EXERCISES: CPT

## 2019-04-09 ENCOUNTER — OFFICE VISIT (OUTPATIENT)
Dept: PHYSICAL THERAPY | Facility: CLINIC | Age: 59
End: 2019-04-09
Payer: COMMERCIAL

## 2019-04-09 DIAGNOSIS — M25.512 ACUTE PAIN OF LEFT SHOULDER: Primary | ICD-10-CM

## 2019-04-09 DIAGNOSIS — Z98.890 S/P LEFT ROTATOR CUFF REPAIR: ICD-10-CM

## 2019-04-09 PROCEDURE — 97140 MANUAL THERAPY 1/> REGIONS: CPT

## 2019-04-09 PROCEDURE — 97110 THERAPEUTIC EXERCISES: CPT

## 2019-04-11 ENCOUNTER — OFFICE VISIT (OUTPATIENT)
Dept: PHYSICAL THERAPY | Facility: CLINIC | Age: 59
End: 2019-04-11
Payer: COMMERCIAL

## 2019-04-11 DIAGNOSIS — Z98.890 S/P LEFT ROTATOR CUFF REPAIR: ICD-10-CM

## 2019-04-11 DIAGNOSIS — M25.512 ACUTE PAIN OF LEFT SHOULDER: Primary | ICD-10-CM

## 2019-04-11 PROCEDURE — 97140 MANUAL THERAPY 1/> REGIONS: CPT

## 2019-04-11 PROCEDURE — 97110 THERAPEUTIC EXERCISES: CPT

## 2019-04-16 ENCOUNTER — OFFICE VISIT (OUTPATIENT)
Dept: PHYSICAL THERAPY | Facility: CLINIC | Age: 59
End: 2019-04-16
Payer: COMMERCIAL

## 2019-04-16 ENCOUNTER — TRANSCRIBE ORDERS (OUTPATIENT)
Dept: PHYSICAL THERAPY | Facility: CLINIC | Age: 59
End: 2019-04-16

## 2019-04-16 DIAGNOSIS — Z98.890 S/P LEFT ROTATOR CUFF REPAIR: ICD-10-CM

## 2019-04-16 DIAGNOSIS — M25.512 ACUTE PAIN OF LEFT SHOULDER: Primary | ICD-10-CM

## 2019-04-16 DIAGNOSIS — M25.512 LEFT SHOULDER PAIN, UNSPECIFIED CHRONICITY: Primary | ICD-10-CM

## 2019-04-16 PROCEDURE — 97110 THERAPEUTIC EXERCISES: CPT

## 2019-04-16 PROCEDURE — 97140 MANUAL THERAPY 1/> REGIONS: CPT

## 2019-04-18 ENCOUNTER — OFFICE VISIT (OUTPATIENT)
Dept: PHYSICAL THERAPY | Facility: CLINIC | Age: 59
End: 2019-04-18
Payer: COMMERCIAL

## 2019-04-18 DIAGNOSIS — M25.512 ACUTE PAIN OF LEFT SHOULDER: Primary | ICD-10-CM

## 2019-04-18 DIAGNOSIS — Z98.890 S/P LEFT ROTATOR CUFF REPAIR: ICD-10-CM

## 2019-04-18 PROCEDURE — 97140 MANUAL THERAPY 1/> REGIONS: CPT

## 2019-04-18 PROCEDURE — 97110 THERAPEUTIC EXERCISES: CPT

## 2019-04-23 ENCOUNTER — OFFICE VISIT (OUTPATIENT)
Dept: PHYSICAL THERAPY | Facility: CLINIC | Age: 59
End: 2019-04-23
Payer: COMMERCIAL

## 2019-04-23 DIAGNOSIS — M25.512 ACUTE PAIN OF LEFT SHOULDER: Primary | ICD-10-CM

## 2019-04-23 DIAGNOSIS — Z98.890 S/P LEFT ROTATOR CUFF REPAIR: ICD-10-CM

## 2019-04-23 PROCEDURE — 97110 THERAPEUTIC EXERCISES: CPT

## 2019-04-23 PROCEDURE — 97140 MANUAL THERAPY 1/> REGIONS: CPT

## 2019-04-29 ENCOUNTER — OFFICE VISIT (OUTPATIENT)
Dept: PHYSICAL THERAPY | Facility: CLINIC | Age: 59
End: 2019-04-29
Payer: COMMERCIAL

## 2019-04-29 DIAGNOSIS — M25.512 ACUTE PAIN OF LEFT SHOULDER: Primary | ICD-10-CM

## 2019-04-29 DIAGNOSIS — Z98.890 S/P LEFT ROTATOR CUFF REPAIR: ICD-10-CM

## 2019-04-29 PROCEDURE — 97110 THERAPEUTIC EXERCISES: CPT

## 2019-04-29 PROCEDURE — 97140 MANUAL THERAPY 1/> REGIONS: CPT

## 2019-04-30 ENCOUNTER — APPOINTMENT (OUTPATIENT)
Dept: PHYSICAL THERAPY | Facility: CLINIC | Age: 59
End: 2019-04-30
Payer: COMMERCIAL

## 2019-05-02 ENCOUNTER — OFFICE VISIT (OUTPATIENT)
Dept: PHYSICAL THERAPY | Facility: CLINIC | Age: 59
End: 2019-05-02
Payer: COMMERCIAL

## 2019-05-02 DIAGNOSIS — Z98.890 S/P LEFT ROTATOR CUFF REPAIR: ICD-10-CM

## 2019-05-02 DIAGNOSIS — M25.512 ACUTE PAIN OF LEFT SHOULDER: Primary | ICD-10-CM

## 2019-05-02 PROCEDURE — 97140 MANUAL THERAPY 1/> REGIONS: CPT

## 2019-05-02 PROCEDURE — 97110 THERAPEUTIC EXERCISES: CPT

## 2019-05-07 ENCOUNTER — OFFICE VISIT (OUTPATIENT)
Dept: PHYSICAL THERAPY | Facility: CLINIC | Age: 59
End: 2019-05-07
Payer: COMMERCIAL

## 2019-05-07 DIAGNOSIS — M25.512 ACUTE PAIN OF LEFT SHOULDER: Primary | ICD-10-CM

## 2019-05-07 DIAGNOSIS — Z98.890 S/P LEFT ROTATOR CUFF REPAIR: ICD-10-CM

## 2019-05-07 PROCEDURE — 97140 MANUAL THERAPY 1/> REGIONS: CPT

## 2019-05-07 PROCEDURE — 97110 THERAPEUTIC EXERCISES: CPT

## 2019-05-09 ENCOUNTER — OFFICE VISIT (OUTPATIENT)
Dept: PHYSICAL THERAPY | Facility: CLINIC | Age: 59
End: 2019-05-09
Payer: COMMERCIAL

## 2019-05-09 DIAGNOSIS — Z98.890 S/P LEFT ROTATOR CUFF REPAIR: ICD-10-CM

## 2019-05-09 DIAGNOSIS — M25.512 ACUTE PAIN OF LEFT SHOULDER: Primary | ICD-10-CM

## 2019-05-09 DIAGNOSIS — G47.00 INSOMNIA, UNSPECIFIED TYPE: ICD-10-CM

## 2019-05-09 PROCEDURE — 97110 THERAPEUTIC EXERCISES: CPT

## 2019-05-09 PROCEDURE — 97140 MANUAL THERAPY 1/> REGIONS: CPT

## 2019-05-09 RX ORDER — ZOLPIDEM TARTRATE 12.5 MG/1
12.5 TABLET, FILM COATED, EXTENDED RELEASE ORAL
Qty: 30 TABLET | Refills: 2 | Status: SHIPPED | OUTPATIENT
Start: 2019-05-09 | End: 2019-08-09 | Stop reason: SDUPTHER

## 2019-05-14 ENCOUNTER — OFFICE VISIT (OUTPATIENT)
Dept: PHYSICAL THERAPY | Facility: CLINIC | Age: 59
End: 2019-05-14
Payer: COMMERCIAL

## 2019-05-14 DIAGNOSIS — Z98.890 S/P LEFT ROTATOR CUFF REPAIR: ICD-10-CM

## 2019-05-14 DIAGNOSIS — M25.512 ACUTE PAIN OF LEFT SHOULDER: Primary | ICD-10-CM

## 2019-05-14 PROCEDURE — 97140 MANUAL THERAPY 1/> REGIONS: CPT

## 2019-05-14 PROCEDURE — 97110 THERAPEUTIC EXERCISES: CPT

## 2019-05-16 ENCOUNTER — OFFICE VISIT (OUTPATIENT)
Dept: PHYSICAL THERAPY | Facility: CLINIC | Age: 59
End: 2019-05-16
Payer: COMMERCIAL

## 2019-05-16 DIAGNOSIS — M25.512 ACUTE PAIN OF LEFT SHOULDER: Primary | ICD-10-CM

## 2019-05-16 DIAGNOSIS — Z98.890 S/P LEFT ROTATOR CUFF REPAIR: ICD-10-CM

## 2019-05-16 PROCEDURE — 97140 MANUAL THERAPY 1/> REGIONS: CPT

## 2019-05-16 PROCEDURE — 97110 THERAPEUTIC EXERCISES: CPT

## 2019-05-21 ENCOUNTER — OFFICE VISIT (OUTPATIENT)
Dept: PHYSICAL THERAPY | Facility: CLINIC | Age: 59
End: 2019-05-21
Payer: COMMERCIAL

## 2019-05-21 DIAGNOSIS — Z98.890 S/P LEFT ROTATOR CUFF REPAIR: ICD-10-CM

## 2019-05-21 DIAGNOSIS — M25.512 ACUTE PAIN OF LEFT SHOULDER: Primary | ICD-10-CM

## 2019-05-21 PROCEDURE — 97140 MANUAL THERAPY 1/> REGIONS: CPT

## 2019-05-21 PROCEDURE — 97110 THERAPEUTIC EXERCISES: CPT

## 2019-05-23 ENCOUNTER — TRANSCRIBE ORDERS (OUTPATIENT)
Dept: PHYSICAL THERAPY | Facility: CLINIC | Age: 59
End: 2019-05-23

## 2019-05-23 ENCOUNTER — OFFICE VISIT (OUTPATIENT)
Dept: PHYSICAL THERAPY | Facility: CLINIC | Age: 59
End: 2019-05-23
Payer: COMMERCIAL

## 2019-05-23 DIAGNOSIS — M25.512 LEFT SHOULDER PAIN, UNSPECIFIED CHRONICITY: Primary | ICD-10-CM

## 2019-05-23 DIAGNOSIS — Z98.890 S/P LEFT ROTATOR CUFF REPAIR: ICD-10-CM

## 2019-05-23 DIAGNOSIS — M25.512 ACUTE PAIN OF LEFT SHOULDER: Primary | ICD-10-CM

## 2019-05-23 PROCEDURE — 97140 MANUAL THERAPY 1/> REGIONS: CPT

## 2019-05-23 PROCEDURE — 97110 THERAPEUTIC EXERCISES: CPT

## 2019-05-29 ENCOUNTER — APPOINTMENT (OUTPATIENT)
Dept: PHYSICAL THERAPY | Facility: CLINIC | Age: 59
End: 2019-05-29
Payer: COMMERCIAL

## 2019-05-30 ENCOUNTER — APPOINTMENT (OUTPATIENT)
Dept: PHYSICAL THERAPY | Facility: CLINIC | Age: 59
End: 2019-05-30
Payer: COMMERCIAL

## 2019-05-31 ENCOUNTER — OFFICE VISIT (OUTPATIENT)
Dept: PHYSICAL THERAPY | Facility: CLINIC | Age: 59
End: 2019-05-31
Payer: COMMERCIAL

## 2019-05-31 DIAGNOSIS — M25.512 ACUTE PAIN OF LEFT SHOULDER: Primary | ICD-10-CM

## 2019-05-31 DIAGNOSIS — Z98.890 S/P LEFT ROTATOR CUFF REPAIR: ICD-10-CM

## 2019-05-31 PROCEDURE — 97110 THERAPEUTIC EXERCISES: CPT | Performed by: PHYSICAL THERAPIST

## 2019-05-31 PROCEDURE — 97140 MANUAL THERAPY 1/> REGIONS: CPT | Performed by: PHYSICAL THERAPIST

## 2019-06-04 ENCOUNTER — EVALUATION (OUTPATIENT)
Dept: PHYSICAL THERAPY | Facility: CLINIC | Age: 59
End: 2019-06-04
Payer: COMMERCIAL

## 2019-06-04 DIAGNOSIS — M25.512 ACUTE PAIN OF LEFT SHOULDER: Primary | ICD-10-CM

## 2019-06-04 DIAGNOSIS — Z98.890 S/P LEFT ROTATOR CUFF REPAIR: ICD-10-CM

## 2019-06-04 PROCEDURE — 97140 MANUAL THERAPY 1/> REGIONS: CPT

## 2019-06-04 PROCEDURE — 97110 THERAPEUTIC EXERCISES: CPT

## 2019-06-12 ENCOUNTER — OFFICE VISIT (OUTPATIENT)
Dept: PHYSICAL THERAPY | Facility: CLINIC | Age: 59
End: 2019-06-12
Payer: COMMERCIAL

## 2019-06-12 DIAGNOSIS — Z98.890 S/P LEFT ROTATOR CUFF REPAIR: ICD-10-CM

## 2019-06-12 DIAGNOSIS — M25.512 ACUTE PAIN OF LEFT SHOULDER: Primary | ICD-10-CM

## 2019-06-12 PROCEDURE — 97110 THERAPEUTIC EXERCISES: CPT

## 2019-06-12 PROCEDURE — 97140 MANUAL THERAPY 1/> REGIONS: CPT

## 2019-06-19 ENCOUNTER — OFFICE VISIT (OUTPATIENT)
Dept: PHYSICAL THERAPY | Facility: CLINIC | Age: 59
End: 2019-06-19
Payer: COMMERCIAL

## 2019-06-19 DIAGNOSIS — M25.512 ACUTE PAIN OF LEFT SHOULDER: Primary | ICD-10-CM

## 2019-06-19 DIAGNOSIS — Z98.890 S/P LEFT ROTATOR CUFF REPAIR: ICD-10-CM

## 2019-06-19 PROCEDURE — 97140 MANUAL THERAPY 1/> REGIONS: CPT

## 2019-06-19 PROCEDURE — 97110 THERAPEUTIC EXERCISES: CPT

## 2019-06-26 ENCOUNTER — OFFICE VISIT (OUTPATIENT)
Dept: PHYSICAL THERAPY | Facility: CLINIC | Age: 59
End: 2019-06-26
Payer: COMMERCIAL

## 2019-06-26 DIAGNOSIS — Z98.890 S/P LEFT ROTATOR CUFF REPAIR: ICD-10-CM

## 2019-06-26 DIAGNOSIS — M25.512 ACUTE PAIN OF LEFT SHOULDER: Primary | ICD-10-CM

## 2019-06-26 PROCEDURE — 97110 THERAPEUTIC EXERCISES: CPT

## 2019-06-26 PROCEDURE — 97140 MANUAL THERAPY 1/> REGIONS: CPT

## 2019-07-03 ENCOUNTER — EVALUATION (OUTPATIENT)
Dept: PHYSICAL THERAPY | Facility: CLINIC | Age: 59
End: 2019-07-03
Payer: COMMERCIAL

## 2019-07-03 ENCOUNTER — TRANSCRIBE ORDERS (OUTPATIENT)
Dept: PHYSICAL THERAPY | Facility: CLINIC | Age: 59
End: 2019-07-03

## 2019-07-03 DIAGNOSIS — Z98.890 S/P LEFT ROTATOR CUFF REPAIR: ICD-10-CM

## 2019-07-03 DIAGNOSIS — M25.512 LEFT SHOULDER PAIN, UNSPECIFIED CHRONICITY: Primary | ICD-10-CM

## 2019-07-03 DIAGNOSIS — M25.512 ACUTE PAIN OF LEFT SHOULDER: Primary | ICD-10-CM

## 2019-07-03 PROCEDURE — 97140 MANUAL THERAPY 1/> REGIONS: CPT

## 2019-07-03 PROCEDURE — 97110 THERAPEUTIC EXERCISES: CPT
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
## 2019-07-10 ENCOUNTER — OFFICE VISIT (OUTPATIENT)
Dept: PHYSICAL THERAPY | Facility: CLINIC | Age: 59
End: 2019-07-10
Payer: COMMERCIAL

## 2019-07-10 DIAGNOSIS — M25.512 ACUTE PAIN OF LEFT SHOULDER: Primary | ICD-10-CM

## 2019-07-10 DIAGNOSIS — Z98.890 S/P LEFT ROTATOR CUFF REPAIR: ICD-10-CM

## 2019-07-10 PROCEDURE — 97140 MANUAL THERAPY 1/> REGIONS: CPT

## 2019-07-10 NOTE — PROGRESS NOTES
Daily Note     Today's date: 7/10/2019  Patient name: Walker Combs  : 1960  MRN: [id number]  Referring provider: Erin Dior MD  Dx:   Encounter Diagnosis     ICD-10-CM    1  Acute pain of left shoulder M25 512    2  S/P left rotator cuff repair Z98 890          Subjective: Patient noted pinch pain with certain movements if he brings his arm down to fast        Objective: See treatment diary below      Assessment: Tolerated treatment well  Patient noted some pinching pain with bringing arm down in scaption for one rep  pain diminished with remainder of PROM  Performed MT only today  Patient would benefit from continued PT      Plan: Continue per plan of care

## 2019-07-17 ENCOUNTER — OFFICE VISIT (OUTPATIENT)
Dept: PHYSICAL THERAPY | Facility: CLINIC | Age: 59
End: 2019-07-17
Payer: COMMERCIAL

## 2019-07-17 DIAGNOSIS — Z98.890 S/P LEFT ROTATOR CUFF REPAIR: ICD-10-CM

## 2019-07-17 DIAGNOSIS — M25.512 ACUTE PAIN OF LEFT SHOULDER: Primary | ICD-10-CM

## 2019-07-17 PROCEDURE — 97140 MANUAL THERAPY 1/> REGIONS: CPT
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
## 2019-07-31 ENCOUNTER — OFFICE VISIT (OUTPATIENT)
Dept: PHYSICAL THERAPY | Facility: CLINIC | Age: 59
End: 2019-07-31
Payer: COMMERCIAL

## 2019-07-31 DIAGNOSIS — Z98.890 S/P LEFT ROTATOR CUFF REPAIR: ICD-10-CM

## 2019-07-31 DIAGNOSIS — M25.512 ACUTE PAIN OF LEFT SHOULDER: Primary | ICD-10-CM

## 2019-07-31 PROCEDURE — 97140 MANUAL THERAPY 1/> REGIONS: CPT

## 2019-07-31 NOTE — PROGRESS NOTES
Daily Note     Today's date: 2019  Patient name: Elina Le  :   MRN: [id number]  Referring provider: Bill Mena MD  Dx:   Encounter Diagnosis     ICD-10-CM    1  Acute pain of left shoulder M25 512    2  S/P left rotator cuff repair Z98 890          Subjective: Patient tells me his shoulder has been feeling really good  He states "I am mostly following doctor's orders at work", explaining sometimes he may be lifting something heavier than he should, but his coworkers remind him and point out that he should take it easy  He says it does not hurt when he is doing activity, but sometimes feels increased soreness after the fact  Objective: See treatment diary below  FOTO score improved from 25 to 77  Assessment: Good tolerance to manuals, continuing to be utilized to improve ROM of L shoulder  Range is slowly improving, still most limited in ER and IR  Patient would benefit from continued therapy focusing on manual stretching to improve PROM in all planes  Plan: Continue per plan of care  Precautions:  Follow post-op protocol     Daily Treatment Diary   *All exercises completed independently through clinic's fitness program   Manuals 6/4 6/12 6/19 6/26 7/3  7/10 7/17   7/31       PROM L shoulder scapular plane AN EH AN LF AN    EH  AN       GHJ mobs grade I, II prn AN NP AN LF AN    NP  AN                                                       Exercise Diary                        Pulleys 10 sec x6 min 10" hold, 6 min 10" hold, x6 min 10" hold, 6 min 10" hold  6 min             UBE 3 min ea 3 min ea L2 3 min ea L2 3' ea L2 3 min                                     Standing scaption 3 sec x10 3"x10 3" x15 3" x20 3"x20             Standing abduction 3 sec x10 3"x10 3" x15 3" x20 3"x20                                     FIR Stretch with Strap       5x10" 10"x5             Sidelying self shoulder IR/ER stretch 30 sec  x3 ea 30"x3 ea 30"x3 ea 30" x3ea 30" x3 ea             Tband Rows Blue web x30 [employer]  Web  x30 [employer] web x30 [employer] web x30 [employer] TB x30             Tband Extension Blue web x30 [employer]  Web  x30 Blue web x30 Blue web x30 Blue TB x30                                                                                                                                    Modalities                       CP post-tx defers deferred defers defers NP            MHP pre-tx 10 min 10 min NP NP NP

## 2019-08-07 ENCOUNTER — OFFICE VISIT (OUTPATIENT)
Dept: PHYSICAL THERAPY | Facility: CLINIC | Age: 59
End: 2019-08-07
Payer: COMMERCIAL

## 2019-08-07 DIAGNOSIS — Z98.890 S/P LEFT ROTATOR CUFF REPAIR: ICD-10-CM

## 2019-08-07 DIAGNOSIS — M25.512 ACUTE PAIN OF LEFT SHOULDER: Primary | ICD-10-CM

## 2019-08-07 PROCEDURE — 97140 MANUAL THERAPY 1/> REGIONS: CPT

## 2019-08-07 NOTE — PROGRESS NOTES
Daily Note     Today's date: 2019  Patient name: Jessy Barone  : [birth year]  MRN: [id number]  Referring provider: Fátima Alexander MD  Dx:   Encounter Diagnosis     ICD-10-CM    1  Acute pain of left shoulder M25 512    2  S/P left rotator cuff repair Z98 890          Subjective: Patient reports he lifted a little more than he should have yesterday, but it only happened one time and he did not do it again  He says he feels fine today but he noticed it was very difficulty to do many activities after the incident yesterday  Objective: See treatment diary below  Assessment: Good tolerance to manual stretching this visit, with no reported pain or discomfort throughout session  Patient would benefit from continued therapy focusing on manual stretching to improve PROM in all planes  Plan: Continue per plan of care  Precautions:  Follow post-op protocol     Daily Treatment Diary   *All exercises completed independently through clinic's fitness program   Manuals 6/4 6/12 6/19 6/26 7/3  7/10 7/17   7/31  8/7     PROM L shoulder scapular plane AN EH AN LF AN    EH  AN  AN     GHJ mobs grade I, II prn AN NP AN LF AN    NP  AN  AN                                                     Exercise Diary                        Pulleys 10 sec x6 min 10" hold, 6 min 10" hold, x6 min 10" hold, 6 min 10" hold  6 min             UBE 3 min ea 3 min ea L2 3 min ea L2 3' ea L2 3 min                                     Standing scaption 3 sec x10 3"x10 3" x15 3" x20 3"x20             Standing abduction 3 sec x10 3"x10 3" x15 3" x20 3"x20                                     FIR Stretch with Strap       5x10" 10"x5             Sidelying self shoulder IR/ER stretch 30 sec  x3 ea 30"x3 ea 30"x3 ea 30" x3ea 30" x3 ea             Tband Rows Blue web x30 Blue  Web  x30 Blue web x30 Blue web x30 Blue TB x30             Tband Extension Blue web x30 SPI Lasers  Web  x30 Blue web x30 Blue web x30 Blue TB x30                                                                                                                                    Modalities                       CP post-tx defers deferred defers defers NP            MHP pre-tx 10 min 10 min NP NP NP

## 2019-08-09 DIAGNOSIS — G47.00 INSOMNIA, UNSPECIFIED TYPE: ICD-10-CM

## 2019-08-09 RX ORDER — ZOLPIDEM TARTRATE 12.5 MG/1
12.5 TABLET, FILM COATED, EXTENDED RELEASE ORAL
Qty: 30 TABLET | Refills: 2 | Status: SHIPPED | OUTPATIENT
Start: 2019-08-09 | End: 2019-11-08 | Stop reason: SDUPTHER

## 2019-08-14 ENCOUNTER — APPOINTMENT (OUTPATIENT)
Dept: PHYSICAL THERAPY | Facility: CLINIC | Age: 59
End: 2019-08-14
Payer: COMMERCIAL

## 2019-08-21 ENCOUNTER — OFFICE VISIT (OUTPATIENT)
Dept: PHYSICAL THERAPY | Facility: CLINIC | Age: 59
End: 2019-08-21
Payer: COMMERCIAL

## 2019-08-21 DIAGNOSIS — M25.512 ACUTE PAIN OF LEFT SHOULDER: Primary | ICD-10-CM

## 2019-08-21 DIAGNOSIS — Z98.890 S/P LEFT ROTATOR CUFF REPAIR: ICD-10-CM

## 2019-08-21 PROCEDURE — 97140 MANUAL THERAPY 1/> REGIONS: CPT

## 2019-08-21 NOTE — PROGRESS NOTES
PT Discharge    Today's date: 2019  Patient name: Kari Juárez  :   MRN: [id number]  Referring provider: Rissa Luis MD  Dx:   Encounter Diagnosis     ICD-10-CM    1  Acute pain of left shoulder M25 512    2  S/P left rotator cuff repair Z98 [id number]          Subjective: Patient states his shoulder has hit a plateau over the past couple weeks  He tells me he has been losing motivation to keep coming to complete exercises for fitness program  He says he is getting a lot of use out of his shoulder while at work and this seems to be helping a little bit  Objective: See treatment diary below  FOTO score improved from 25 to 100 in 35 visits  Assessment: Kari Juárez has been compliant with attending PT and HEP since initial eval  Kari Juárez has made improvements in objective data since IE and has maximized function  Patient is agreeable to d/c to HEP at this time, and will contact clinic with any exercise related questions or concerns as needed  Plan: Discharge to independent home program          Precautions:  Follow post-op protocol     Daily Treatment Diary   *All exercises completed independently through clinic's fitness program   Manuals 6/4 6/12 6/19 6/26 7/3  7/10 7/17   7/31  8/7  8/21   PROM L shoulder scapular plane AN EH AN LF AN    EH  AN  AN  AN   GHJ mobs grade I, II prn AN NP AN LF AN    NP  AN  AN  AN                                                   Exercise Diary                        Pulleys 10 sec x6 min 10" hold, 6 min 10" hold, x6 min 10" hold, 6 min 10" hold  6 min             UBE 3 min ea 3 min ea L2 3 min ea L2 3' ea L2 3 min                                     Standing scaption 3 sec x10 3"x10 3" x15 3" x20 3"x20             Standing abduction 3 sec x10 3"x10 3" x15 3" x20 3"x20                                     FIR Stretch with Strap       5x10" 10"x5             Sidelying self shoulder IR/ER stretch 30 sec  x3 ea 30"x3 ea 30"x3 ea 30" x3ea 30" x3 ea             Tband Rows Blue web x30 Blue  Web  x30 Blue web x30 Blue web x30 Snapguide TB x30            Tband Extension Snapguide web x30 Snapguide  Web  x30 Blue web x30 Blue web x30 Blue TB x30                                                                                                                                    Modalities                       CP post-tx defers deferred defers defers NP            MHP pre-tx 10 min 10 min NP NP NP

## 2019-11-08 DIAGNOSIS — G47.00 INSOMNIA, UNSPECIFIED TYPE: ICD-10-CM

## 2019-11-08 NOTE — TELEPHONE ENCOUNTER
Pt left voicemail requesting rx for Ambien to be sent to [facility] but he did not state which one   lvm to call us back

## 2019-11-12 RX ORDER — ZOLPIDEM TARTRATE 12.5 MG/1
12.5 TABLET, FILM COATED, EXTENDED RELEASE ORAL
Qty: 30 TABLET | Refills: 2 | Status: SHIPPED | OUTPATIENT
Start: 2019-11-12 | End: 2020-02-06 | Stop reason: SDUPTHER

## 2020-02-06 DIAGNOSIS — G47.00 INSOMNIA, UNSPECIFIED TYPE: ICD-10-CM

## 2020-02-06 RX ORDER — ZOLPIDEM TARTRATE 12.5 MG/1
12.5 TABLET, FILM COATED, EXTENDED RELEASE ORAL
Qty: 30 TABLET | Refills: 0 | Status: SHIPPED | OUTPATIENT
Start: 2020-02-06 | End: 2020-02-19 | Stop reason: SDUPTHER

## 2020-02-19 ENCOUNTER — OFFICE VISIT (OUTPATIENT)
Dept: FAMILY MEDICINE CLINIC | Facility: CLINIC | Age: 60
End: 2020-02-19
Payer: COMMERCIAL

## 2020-02-19 ENCOUNTER — TELEPHONE (OUTPATIENT)
Dept: ADMINISTRATIVE | Facility: OTHER | Age: 60
End: 2020-02-19

## 2020-02-19 VITALS
SYSTOLIC BLOOD PRESSURE: 152 MMHG | BODY MASS INDEX: 26.98 KG/M2 | HEART RATE: 62 BPM | DIASTOLIC BLOOD PRESSURE: 78 MMHG | RESPIRATION RATE: 16 BRPM | WEIGHT: 217 LBS | HEIGHT: 75 IN

## 2020-02-19 DIAGNOSIS — E66.3 OVERWEIGHT (BMI 25.0-29.9): ICD-10-CM

## 2020-02-19 DIAGNOSIS — G47.00 INSOMNIA, UNSPECIFIED TYPE: Primary | ICD-10-CM

## 2020-02-19 DIAGNOSIS — Z12.5 SCREENING FOR PROSTATE CANCER: ICD-10-CM

## 2020-02-19 DIAGNOSIS — S05.01XA ABRASION OF RIGHT CORNEA, INITIAL ENCOUNTER: ICD-10-CM

## 2020-02-19 PROCEDURE — 3008F BODY MASS INDEX DOCD: CPT | Performed by: FAMILY MEDICINE

## 2020-02-19 PROCEDURE — 1036F TOBACCO NON-USER: CPT | Performed by: FAMILY MEDICINE

## 2020-02-19 PROCEDURE — 99214 OFFICE O/P EST MOD 30 MIN: CPT | Performed by: FAMILY MEDICINE

## 2020-02-19 RX ORDER — ZOLPIDEM TARTRATE 12.5 MG/1
12.5 TABLET, FILM COATED, EXTENDED RELEASE ORAL
Qty: 30 TABLET | Refills: 5 | Status: SHIPPED | OUTPATIENT
Start: 2020-02-19 | End: 2020-08-24 | Stop reason: SDUPTHER

## 2020-02-19 RX ORDER — TOBRAMYCIN 3 MG/ML
1 SOLUTION/ DROPS OPHTHALMIC
Qty: 5 ML | Refills: 0 | Status: SHIPPED | OUTPATIENT
Start: 2020-02-19 | End: 2020-02-22

## 2020-02-19 NOTE — TELEPHONE ENCOUNTER
----- Message from Jacque Garcia MA sent at 2/19/2020  9:27 AM EST -----  Regarding: Colonoscopy   02/19/20 9:28 AM    Hello, our patient Shiraz Banks has had CRC: Colonoscopy completed/performed  Please assist in updating the patient chart by making an External outreach to [facility] facility located in [place], [place]  The date of service is 2019  Patient states he is not due for colonoscopy until 2022      Thank you,  Jacque Garcia MA  [facility] PRIMARY [facility]ARE

## 2020-02-19 NOTE — LETTER
2nd request    Procedure Request Form: Colonoscopy      Date Requested: 20  Patient: Chandu Del Real  Patient : 1960   Referring Provider: Jeanine Tyson, MD        Date of Procedure ______________________________       The above patient has informed us that they have completed their   most recent Colonoscopy at your facility  Please complete   this form and attach all corresponding procedure reports/results  Comments __________________________________________________________  ____________________________________________________________________  ____________________________________________________________________  ____________________________________________________________________    Facility Completing Procedure _________________________________________    Form Completed By (print name) _______________________________________      Signature __________________________________________________________      These reports are needed for  compliance    Please fax this completed form and a copy of the procedure report to our office located at [place] [address] as soon as possible to [phone] durga Lowe: Phone [phone]    We thank you for your assistance in treating our mutual patient

## 2020-02-19 NOTE — LETTER
Procedure Request Form: Colonoscopy      Date Requested: 20  Patient: Luis Manuel Garcia  Patient : 1960   Referring Provider: Alveda Sole, MD        Date of Procedure ______________________________       The above patient has informed us that they have completed their   most recent Colonoscopy at your facility  Please complete   this form and attach all corresponding procedure reports/results  Comments __________________________________________________________  ____________________________________________________________________  ____________________________________________________________________  ____________________________________________________________________    Facility Completing Procedure _________________________________________    Form Completed By (print name) _______________________________________      Signature __________________________________________________________      These reports are needed for  compliance    Please fax this completed form and a copy of the procedure report to our office located at [place] [address] as soon as possible to [phone] attention Kristy Bonilla: Phone [phone]    We thank you for your assistance in treating our mutual patient

## 2020-02-19 NOTE — PATIENT INSTRUCTIONS
Rich cruzes will probably be better if takes glucosamine consistently  Obesity   AMBULATORY CARE:   Obesity  is when your body mass index (BMI) is greater than 30  Your healthcare provider will use your height and weight to measure your BMI  The risks of obesity include  many health problems, such as injuries or physical disability  You may need tests to check for the following:  · Diabetes     · High blood pressure or high cholesterol     · Heart disease     · Gallbladder or liver disease     · Cancer of the colon, breast, prostate, liver, or kidney     · Sleep apnea     · Arthritis or gout  Seek care immediately if:   · You have a severe headache, confusion, or difficulty speaking  · You have weakness on one side of your body  · You have chest pain, sweating, or shortness of breath  Contact your healthcare provider if:   · You have symptoms of gallbladder or liver disease, such as pain in your upper abdomen  · You have knee or hip pain and discomfort while walking  · You have symptoms of diabetes, such as intense hunger and thirst, and frequent urination  · You have symptoms of sleep apnea, such as snoring or daytime sleepiness  · You have questions or concerns about your condition or care  Treatment for obesity  focuses on helping you lose weight to improve your health  Even a small decrease in BMI can reduce the risk for many health problems  Your healthcare provider will help you set a weight-loss goal   · Lifestyle changes  are the first step in treating obesity  These include making healthy food choices and getting regular physical activity  Your healthcare provider may suggest a weight-loss program that involves coaching, education, and therapy  · Medicine  may help you lose weight when it is used with a healthy diet and physical activity  · Surgery  can help you lose weight if you are very obese and have other health problems   There are several types of weight-loss surgery  Ask your healthcare provider for more information  Be successful losing weight:   · Set small, realistic goals  An example of a small goal is to walk for 20 minutes 5 days a week  Anther goal is to lose 5% of your body weight  · Tell friends, family members, and coworkers about your goals  and ask for their support  Ask a friend to lose weight with you, or join a weight-loss support group  · Identify foods or triggers that may cause you to overeat , and find ways to avoid them  Remove tempting high-calorie foods from your home and workplace  Place a bowl of fresh fruit on your kitchen counter  If stress causes you to eat, then find other ways to cope with stress  · Keep a diary to track what you eat and drink  Also write down how many minutes of physical activity you do each day  Weigh yourself once a week and record it in your diary  Eating changes: You will need to eat 500 to 1,000 fewer calories each day than you currently eat to lose 1 to 2 pounds a week  The following changes will help you cut calories:  · Eat smaller portions  Use small plates, no larger than 9 inches in diameter  Fill your plate half full of fruits and vegetables  Measure your food using measuring cups until you know what a serving size looks like  · Eat 3 meals and 1 or 2 snacks each day  Plan your meals in advance  Alonso Sharpe and eat at home most of the time  Eat slowly  · Eat fruits and vegetables at every meal   They are low in calories and high in fiber, which makes you feel full  Do not add butter, margarine, or cream sauce to vegetables  Use herbs to season steamed vegetables  · Eat less fat and fewer fried foods  Eat more baked or grilled chicken and fish  These protein sources are lower in calories and fat than red meat  Limit fast food  Dress your salads with olive oil and vinegar instead of bottled dressing  · Limit the amount of sugar you eat  Do not drink sugary beverages   Limit alcohol  Activity changes:  Physical activity is good for your body in many ways  It helps you burn calories and build strong muscles  It decreases stress and depression, and improves your mood  It can also help you sleep better  Talk to your healthcare provider before you begin an exercise program   · Exercise for at least 30 minutes 5 days a week  Start slowly  Set aside time each day for physical activity that you enjoy and that is convenient for you  It is best to do both weight training and an activity that increases your heart rate, such as walking, bicycling, or swimming  · Find ways to be more active  Do yard work and housecleaning  Walk up the stairs instead of using elevators  Spend your leisure time going to events that require walking, such as outdoor festivals or fairs  This extra physical activity can help you lose weight and keep it off  Follow up with your healthcare provider as directed: You may need to meet with a dietitian  Write down your questions so you remember to ask them during your visits  © 2017 [address] Information is for End User's use only and may not be sold, redistributed or otherwise used for commercial purposes  All illustrations and images included in CareNotes® are the copyrighted property of A D A M [employer]  or Terrance Napier  The above information is an  only  It is not intended as medical advice for individual conditions or treatments  Talk to your doctor, nurse or pharmacist before following any medical regimen to see if it is safe and effective for you  Weight Management   AMBULATORY CARE:   Why it is important to manage your weight:  Being overweight increases your risk of health conditions such as heart disease, high blood pressure, type 2 diabetes, and certain types of cancer  It can also increase your risk for osteoarthritis, sleep apnea, and other respiratory problems  Aim for a slow, steady weight loss   Even a small amount of weight loss can lower your risk of health problems  How to lose weight safely:  A safe and healthy way to lose weight is to eat fewer calories and get regular exercise  You can lose up about 1 pound a week by decreasing the number of calories you eat by 500 calories each day  You can decrease calories by eating smaller portion sizes or by cutting out high-calorie foods  Read labels to find out how many calories are in the foods you eat  You can also burn calories with exercise such as walking, swimming, or biking  You will be more likely to keep weight off if you make these changes part of your lifestyle  Healthy meal plan for weight management:  A healthy meal plan includes a variety of foods, contains fewer calories, and helps you stay healthy  A healthy meal plan includes the following:  · Eat whole-grain foods more often  A healthy meal plan should contain fiber  Fiber is the part of grains, fruits, and vegetables that is not broken down by your body  Whole-grain foods are healthy and provide extra fiber in your diet  Some examples of whole-grain foods are whole-wheat breads and pastas, oatmeal, brown rice, and bulgur  · Eat a variety of vegetables every day  Include dark, leafy greens such as spinach, kale, krissy greens, and mustard greens  Eat yellow and orange vegetables such as carrots, sweet potatoes, and winter squash  · Eat a variety of fruits every day  Choose fresh or canned fruit (canned in its own juice or light syrup) instead of juice  Fruit juice has very little or no fiber  · Eat low-fat dairy foods  Drink fat-free (skim) milk or 1% milk  Eat fat-free yogurt and low-fat cottage cheese  Try low-fat cheeses such as mozzarella and other reduced-fat cheeses  · Choose meat and other protein foods that are low in fat  Choose beans or other legumes such as split peas or lentils   Choose fish, skinless poultry (chicken or turkey), or lean cuts of red meat (beef or pork)  Before you cook meat or poultry, cut off any visible fat  · Use less fat and oil  Try baking foods instead of frying them  Add less fat, such as margarine, sour cream, regular salad dressing and mayonnaise to foods  Eat fewer high-fat foods  Some examples of high-fat foods include french fries, doughnuts, ice cream, and cakes  · Eat fewer sweets  Limit foods and drinks that are high in sugar  This includes candy, cookies, regular soda, and sweetened drinks  Ways to decrease calories:   · Eat smaller portions  ¨ Use a small plate with smaller servings  ¨ Do not eat second helpings  ¨ When you eat at a restaurant, ask for a box and place half of your meal in the box before you eat  ¨ Share an entrée with someone else  · Replace high-calorie snacks with healthy, low-calorie snacks  ¨ Choose fresh fruit, vegetables, fat-free rice cakes, or air-popped popcorn instead of potato chips, nuts, or chocolate  ¨ Choose water or calorie-free drinks instead of soda or sweetened drinks  · Eat regular meals  Skipping meals can lead to overeating later in the day  Eat a healthy snack in place of a meal if you do not have time to eat a regular meal      · Do not shop for groceries when you are hungry  You may be more likely to make unhealthy food choices  Take a grocery list of healthy foods and shop after you have eaten  Exercise:  Exercise at least 30 minutes per day on most days of the week  Some examples of exercise include walking, biking, dancing, and swimming  You can also fit in more physical activity by taking the stairs instead of the elevator or parking farther away from stores  Ask your healthcare provider about the best exercise plan for you  Other things to consider as you try to lose weight:   · Be aware of situations that may give you the urge to overeat, such as eating while watching television  Find ways to avoid these situations   For example, read a book, go for a walk, or do crafts  · Meet with a weight loss support group or friends who are also trying to lose weight  This may help you stay motivated to continue working on your weight loss goals  © 2017 [address] Information is for End User's use only and may not be sold, redistributed or otherwise used for commercial purposes  All illustrations and images included in CareNotes® are the copyrighted property of A D A M , Inc  or Terrance Napier  The above information is an  only  It is not intended as medical advice for individual conditions or treatments  Talk to your doctor, nurse or pharmacist before following any medical regimen to see if it is safe and effective for you

## 2020-02-19 NOTE — PROGRESS NOTES
Assessment/Plan:    Insomnia  Refill meds       Diagnoses and all orders for this visit:    Insomnia, unspecified type  -     zolpidem (AMBIEN CR) 12 5 MG CR tablet; Take 1 tablet (12 5 mg total) by mouth daily at bedtime as needed for sleep    Overweight (BMI 25 0-29 9)  -     Lipid panel; Future  -     Comprehensive metabolic panel; Future    Abrasion of right cornea, initial encounter  -     tobramycin (Tobrex) 0 3 % SOLN; Administer 1 drop to the right eye every 4 (four) hours while awake for 3 days    Screening for prostate cancer  -     PSA, total and free; Future          Subjective:      Patient ID: Kassy Kingsley is a 62 y o  male  F/u insomnia-doing well on ambien, joint pain in index fingers-does get popping sometimes, hard to open things that require pincer grasp, 2 1/2 weeks ago felt like he had something in r eye-? Work exposure      The following portions of the patient's history were reviewed and updated as appropriate: allergies, current medications, past family history, past medical history, past social history, past surgical history and problem list       Review of Systems   Constitutional: Negative for activity change and appetite change  Eyes:        Eye discomfort   Respiratory: Negative for shortness of breath  Cardiovascular: Negative for chest pain  Musculoskeletal: Positive for arthralgias  Finger pain         Objective:      /78   Pulse 62   Resp 16   Ht 6' 3" (1 905 m)   Wt 98 4 kg (217 lb)   BMI 27 12 kg/m²          Physical Exam   Constitutional: He appears well-developed and well-nourished  Eyes:   Corneal abrasion 9 o'clock r eye   Neck: No thyromegaly present  Cardiovascular: Normal rate, regular rhythm, normal heart sounds and intact distal pulses  Pulmonary/Chest: Effort normal    Musculoskeletal: He exhibits no edema  Lymphadenopathy:     He has no cervical adenopathy  Vitals reviewed  BMI Counseling: Body mass index is 27 12 kg/m²   The BMI is above normal  Nutrition recommendations include reducing portion sizes, decreasing overall calorie intake, 3-5 servings of fruits/vegetables daily, reducing fast food intake and consuming healthier snacks  Exercise recommendations include exercising 3-5 times per week

## 2020-02-24 NOTE — TELEPHONE ENCOUNTER
As a follow-up, a second attempt has been made for outreach via fax, please see Contacts section for details  A third and final attempt will be made within 3 business days      Thank you  Tr Casper

## 2020-02-27 NOTE — TELEPHONE ENCOUNTER
As a final attempt, a third outreach has been made via telephone call  Please see Contacts section for details  This encounter will be closed and completed by end of day  Should we receive the requested information because of previous outreach attempts, the requested patient's chart will be updated appropriately       Thank you  Dorene Galindo

## 2020-02-27 NOTE — TELEPHONE ENCOUNTER
Upon review of your request/inquiry, we have found as a result of outreach that patient did not have the requested item(s) completed  Any additional questions or concerns should be emailed to the Practice Liaisons via [email or web address] email, please do not reply via In Basket  Received fax from [facility] patients last colonoscopy completed was 12/16/13, HM is already up to date       Thank you  Doris West

## 2020-03-11 LAB
ALBUMIN SERPL-MCNC: 4.7 G/DL (ref 3.8–4.8)
ALBUMIN/GLOB SERPL: 1.7 {RATIO} (ref 1.2–2.2)
ALP SERPL-CCNC: 76 IU/L (ref 39–117)
ALT SERPL-CCNC: 15 IU/L (ref 0–44)
AST SERPL-CCNC: 16 IU/L (ref 0–40)
BILIRUB SERPL-MCNC: 0.6 MG/DL (ref 0–1.2)
BUN SERPL-MCNC: 21 MG/DL (ref 8–27)
BUN/CREAT SERPL: 18 (ref 10–24)
CALCIUM SERPL-MCNC: 9.4 MG/DL (ref 8.6–10.2)
CHLORIDE SERPL-SCNC: 103 MMOL/L (ref 96–106)
CHOLEST SERPL-MCNC: 218 MG/DL (ref 100–199)
CO2 SERPL-SCNC: 24 MMOL/L (ref 20–29)
CREAT SERPL-MCNC: 1.2 MG/DL (ref 0.76–1.27)
GLOBULIN SER-MCNC: 2.8 G/DL (ref 1.5–4.5)
GLUCOSE SERPL-MCNC: 104 MG/DL (ref 65–99)
HDLC SERPL-MCNC: 59 MG/DL
LDLC SERPL CALC-MCNC: 138 MG/DL (ref 0–99)
POTASSIUM SERPL-SCNC: 4.7 MMOL/L (ref 3.5–5.2)
PROT SERPL-MCNC: 7.5 G/DL (ref 6–8.5)
PSA FREE MFR SERPL: 38.9 %
PSA FREE SERPL-MCNC: 0.7 NG/ML
PSA SERPL-MCNC: 1.8 NG/ML (ref 0–4)
SL AMB EGFR AFRICAN AMERICAN: 75 ML/MIN/1.73
SL AMB EGFR NON AFRICAN AMERICAN: 65 ML/MIN/1.73
SL AMB VLDL CHOLESTEROL CALC: 21 MG/DL (ref 5–40)
SODIUM SERPL-SCNC: 141 MMOL/L (ref 134–144)
TRIGL SERPL-MCNC: 103 MG/DL (ref 0–149)

## 2020-08-24 DIAGNOSIS — G47.00 INSOMNIA, UNSPECIFIED TYPE: ICD-10-CM

## 2020-08-24 RX ORDER — ZOLPIDEM TARTRATE 12.5 MG/1
12.5 TABLET, FILM COATED, EXTENDED RELEASE ORAL
Qty: 30 TABLET | Refills: 5 | Status: SHIPPED | OUTPATIENT
Start: 2020-08-24 | End: 2021-02-15 | Stop reason: SDUPTHER

## 2021-01-06 ENCOUNTER — TELEMEDICINE (OUTPATIENT)
Dept: FAMILY MEDICINE CLINIC | Facility: CLINIC | Age: 61
End: 2021-01-06
Payer: COMMERCIAL

## 2021-01-06 ENCOUNTER — TELEPHONE (OUTPATIENT)
Dept: FAMILY MEDICINE CLINIC | Facility: CLINIC | Age: 61
End: 2021-01-06

## 2021-01-06 VITALS — TEMPERATURE: 97.8 F

## 2021-01-06 DIAGNOSIS — B34.9 VIRAL SYNDROME: ICD-10-CM

## 2021-01-06 DIAGNOSIS — B34.9 VIRAL SYNDROME: Primary | ICD-10-CM

## 2021-01-06 PROCEDURE — 3725F SCREEN DEPRESSION PERFORMED: CPT | Performed by: FAMILY MEDICINE

## 2021-01-06 PROCEDURE — 99213 OFFICE O/P EST LOW 20 MIN: CPT | Performed by: FAMILY MEDICINE

## 2021-01-06 PROCEDURE — U0003 INFECTIOUS AGENT DETECTION BY NUCLEIC ACID (DNA OR RNA); SEVERE ACUTE RESPIRATORY SYNDROME CORONAVIRUS 2 (SARS-COV-2) (CORONAVIRUS DISEASE [COVID-19]), AMPLIFIED PROBE TECHNIQUE, MAKING USE OF HIGH THROUGHPUT TECHNOLOGIES AS DESCRIBED BY CMS-2020-01-R: HCPCS | Performed by: FAMILY MEDICINE

## 2021-01-06 PROCEDURE — 1036F TOBACCO NON-USER: CPT | Performed by: FAMILY MEDICINE

## 2021-01-06 NOTE — TELEPHONE ENCOUNTER
Pt had a virtual apt with Dr Irene Mejia today 1/6 but his employer is requesting some kind of documentation/ letter from the office stating that pt did get covid tested (the original letter wasn't enough)  It can be emailed to [email or web address]    Thank you!

## 2021-01-06 NOTE — LETTER
January 6, 2021     Patient: Azeb Newsome   YOB: 1960   Date of Visit: 1/6/2021       To Whom it May Concern:    Kandice Higgins is under my professional care  He was seen in my office on 1/6/2021  He may return to work on 1/11/21  If you have any questions or concerns, please don't hesitate to call           Sincerely,          Maximo Lange MD        CC: No Recipients

## 2021-01-06 NOTE — PROGRESS NOTES
Virtual Regular Visit      Assessment/Plan:    Problem List Items Addressed This Visit        Other    Viral syndrome - Primary     Suspect viral sx no covid related since resolved overnight but due to pt's age and wife's co -morbid conditions will test to  Reassure that negative         Relevant Orders    Novel Coronavirus (COVID-19), PCR [facility] - Collected at   [facility] or [facility]               Reason for visit is   Chief Complaint   Patient presents with    Generalized Body Aches     last night     Fatigue     last night     Fever     101 last night     Virtual Regular Visit        Encounter provider Migdalia Hazel MD    Provider located at [address] [place] [address] [address] [facility] [address]      Recent Visits  No visits were found meeting these conditions  Showing recent visits within past 7 days and meeting all other requirements     Today's Visits  Date Type Provider Dept   01/06/21 Telemedicine Migdalia Hazel MD [facility]   Showing today's visits and meeting all other requirements     Future Appointments  No visits were found meeting these conditions  Showing future appointments within next 150 days and meeting all other requirements        The patient was identified by name and date of birth  Suzan Spatz was informed that this is a telemedicine visit and that the visit is being conducted through [address] [place] [address] and patient was informed that this is not a secure, HIPAA-compliant platform  He agrees to proceed     My office door was closed  No one else was in the room  He acknowledged consent and understanding of privacy and security of the video platform  The patient has agreed to participate and understands they can discontinue the visit at any time  Patient is aware this is a billable service       Subjective  Suzan Spatz is a 56 y o  male       Felt fatigue and had achy legs, had temp 101, now feels fine, not around anyone sick, no change in smell or taste, no resp or gi sx       Past Medical History:   Diagnosis Date    Difficulty sleeping     GERD (gastroesophageal reflux disease)     Impotence, organic     Insomnia        Past Surgical History:   Procedure Laterality Date    ADENOIDECTOMY      BREAST SURGERY      lumpectomy    TONSILLECTOMY         Current Outpatient Medications   Medication Sig Dispense Refill    glucosamine-chondroitin 500-400 MG tablet       zolpidem (AMBIEN CR) 12 5 MG CR tablet Take 1 tablet (12 5 mg total) by mouth daily at bedtime as needed for sleep 30 tablet 5     No current facility-administered medications for this visit  No Known Allergies    Review of Systems   Constitutional: Positive for fever  Negative for activity change, appetite change and chills  Respiratory: Negative for shortness of breath  Cardiovascular: Negative for chest pain  Neurological: Negative for dizziness and headaches  Hematological: Negative for adenopathy  Video Exam    Vitals:    01/06/21 0921   Temp: 97 8 °F (36 6 °C)       Physical Exam  Vitals signs reviewed  Constitutional:       Appearance: Normal appearance  HENT:      Nose: No congestion or rhinorrhea  Eyes:      General:         Right eye: No discharge  Left eye: No discharge  Pulmonary:      Effort: Pulmonary effort is normal  No respiratory distress  Neurological:      Mental Status: He is alert  I spent 10 minutes directly with the patient during this visit      VIRTUAL VISIT [address] acknowledges that he has consented to an online visit or consultation  He understands that the online visit is based solely on information provided by him, and that, in the absence of a face-to-face physical evaluation by the physician, the diagnosis he receives is both limited and provisional in terms of accuracy and completeness  This is not intended to replace a full medical face-to-face evaluation by the physician   Otto Menon Diogo Jaime understands and accepts these terms

## 2021-01-06 NOTE — ASSESSMENT & PLAN NOTE
Suspect viral sx no covid related since resolved overnight but due to pt's age and wife's co -morbid conditions will test to  Reassure that negative

## 2021-01-07 LAB — SARS-COV-2 RNA SPEC QL NAA+PROBE: NOT DETECTED

## 2021-02-15 DIAGNOSIS — G47.00 INSOMNIA, UNSPECIFIED TYPE: ICD-10-CM

## 2021-02-15 RX ORDER — ZOLPIDEM TARTRATE 12.5 MG/1
12.5 TABLET, FILM COATED, EXTENDED RELEASE ORAL
Qty: 30 TABLET | Refills: 5 | Status: SHIPPED | OUTPATIENT
Start: 2021-02-15 | End: 2021-08-19

## 2021-02-15 NOTE — TELEPHONE ENCOUNTER
Pt called requesting refill on pending medication to be sent to [facility]  Pt was last OV was 2/2020  Please send if appropriate  Thank you

## 2021-04-15 ENCOUNTER — OFFICE VISIT (OUTPATIENT)
Dept: FAMILY MEDICINE CLINIC | Facility: CLINIC | Age: 61
End: 2021-04-15
Payer: COMMERCIAL

## 2021-04-15 VITALS
TEMPERATURE: 97.6 F | RESPIRATION RATE: 16 BRPM | DIASTOLIC BLOOD PRESSURE: 72 MMHG | HEART RATE: 64 BPM | SYSTOLIC BLOOD PRESSURE: 134 MMHG | BODY MASS INDEX: 26.36 KG/M2 | WEIGHT: 212 LBS | HEIGHT: 75 IN

## 2021-04-15 DIAGNOSIS — Z12.11 SCREENING FOR COLON CANCER: ICD-10-CM

## 2021-04-15 DIAGNOSIS — M17.12 PRIMARY OSTEOARTHRITIS OF LEFT KNEE: Primary | ICD-10-CM

## 2021-04-15 PROBLEM — B34.9 VIRAL SYNDROME: Status: RESOLVED | Noted: 2021-01-06 | Resolved: 2021-04-15

## 2021-04-15 PROCEDURE — 99214 OFFICE O/P EST MOD 30 MIN: CPT | Performed by: FAMILY MEDICINE

## 2021-04-15 PROCEDURE — 3008F BODY MASS INDEX DOCD: CPT | Performed by: FAMILY MEDICINE

## 2021-04-15 PROCEDURE — 1036F TOBACCO NON-USER: CPT | Performed by: FAMILY MEDICINE

## 2021-04-15 RX ORDER — IBUPROFEN 200 MG
TABLET ORAL EVERY 6 HOURS PRN
COMMUNITY

## 2021-04-15 NOTE — PROGRESS NOTES
Assessment and Plan:    Problem List Items Addressed This Visit        Musculoskeletal and Integument    Primary osteoarthritis of left knee - Primary     Cleared for surgery                      Diagnoses and all orders for this visit:    Primary osteoarthritis of left knee    Other orders  -     Multiple Vitamin (MULTIVITAMIN ADULT PO); Take 1 tablet by mouth daily  -     MELATONIN PO; Take by mouth  -     Acetaminophen (Tylenol) 325 MG CAPS; Take by mouth as needed  -     ibuprofen (MOTRIN) 200 mg tablet; Take by mouth every 6 (six) hours as needed for mild pain              Subjective:      Patient ID: Eugenio Liz is a 56 y o  male  CC:    Chief Complaint   Patient presents with    Follow-up     Patient present today for a follow up with Dr John Hanley  Per patient he needs to have a left knee replacement and will like to discuss it with Dr John Hanley  HPI:  Chief Complaint   Patient presents with    Follow-up     Patient present today for a follow up with Dr John Hanley  Per patient he needs to have a left knee replacement and will like to discuss it with Dr John Hanley  Subjective:     Eugenio Liz is a 56 y o  male who presents to the office today for a preoperative consultation at the request of surgeon Dr Deisy Day who plans on performing l total knee replacement on May 5  Planned anesthesia: general and spinal The patient has the following known anesthesia issues: none  Patients bleeding risk: no recent abnormal bleeding, no remote history of abnormal bleeding and no use of Ca-channel blockers  Patient does not have objections to receiving blood products if needed      The following portions of the patient's history were reviewed and updated as appropriate: allergies, current medications, past family history, past medical history, past social history, past surgical history and problem list       Exercise Capacity:   able to walk 1/4 blocks without symptoms and able to walk two flights of stairs without symptoms  Up better than down    Alcohol use: Yes Moderate  Tobacco use: The patient denies current or previous tobacco use  Illicit drugs: no history of illicit drug use    Past Medical History:   Diagnosis Date    Difficulty sleeping     GERD (gastroesophageal reflux disease)     Impotence, organic     Insomnia      Past Surgical History:   Procedure Laterality Date    ADENOIDECTOMY      BREAST SURGERY      lumpectomy    TONSILLECTOMY       Family History   Problem Relation Age of Onset    Lung cancer Mother     Heart disease Father     Colon cancer Father      Current Outpatient Medications on File Prior to Visit   Medication Sig Dispense Refill    Acetaminophen (Tylenol) 325 MG CAPS Take by mouth as needed      glucosamine-chondroitin 500-400 MG tablet       ibuprofen (MOTRIN) 200 mg tablet Take by mouth every 6 (six) hours as needed for mild pain      MELATONIN PO Take by mouth      Multiple Vitamin (MULTIVITAMIN ADULT PO) Take 1 tablet by mouth daily      zolpidem (AMBIEN CR) 12 5 MG CR tablet Take 1 tablet (12 5 mg total) by mouth daily at bedtime as needed for sleep 30 tablet 5     No current facility-administered medications on file prior to visit  Patient has no known allergies        Review of Systems    Musculoskeletal:positive for knee pain l  Behavioral/Psych: positive for anxiety mild  ENT negative  CV negative  Pulm: negative  Objective:     /72 (BP Location: Left arm, Patient Position: Sitting, Cuff Size: Large)   Pulse 64   Temp 97 6 °F (36 4 °C) (Tympanic)   Resp 16   Ht 6' 3" (1 905 m)   Wt 96 2 kg (212 lb)   BMI 26 50 kg/m²     General Appearance:    Alert, cooperative, no distress, appears stated age   Head:    Normocephalic, without obvious abnormality, atraumatic   Eyes:    PERRL, conjunctiva/corneas clear, EOM's intact, fundi     benign, both eyes        Ears:    Normal TM's and external ear canals, both ears   Nose:   Nares normal, septum midline, mucosa normal, no drainage    or sinus tenderness   Throat:   Lips, mucosa, and tongue normal; teeth and gums normal   Neck:   Supple, symmetrical, trachea midline, no adenopathy;        thyroid:  No enlargement/tenderness/nodules; no carotid    bruit or JVD   Back:     Symmetric, no curvature, ROM normal, no CVA tenderness   Lungs:     Clear to auscultation bilaterally, respirations unlabored   Chest wall:    No tenderness or deformity   Heart:    Regular rate and rhythm, S1 and S2 normal, no murmur, rub   or gallop   Abdomen:     Soft, non-tender, bowel sounds active all four quadrants,     no masses, no organomegaly           Extremities:   Extremities normal, atraumatic, no cyanosis or edema   Pulses:   2+ and symmetric all extremities   Skin:   Skin color, texture, turgor normal, no rashes or lesions   Lymph nodes:   Cervical, supraclavicular, and axillary nodes normal             Cardiographics  ECG: no change since previous ECG dated 2015  Echocardiogram: not done      Lab Review all pre-op lab stable  No visits with results within 2 Month(s) from this visit  Latest known visit with results is:   Orders Only on 01/06/2021   Component Date Value    SARS-CoV-2  01/06/2021 Not Detected         Assessment:     56 y o  male with planned surgery as above  Known risk factors for perioperative complications: None        Cardiac Risk Estimation: low      Plan:    1  Primary osteoarthritis of left knee            1  Preoperative workup as follows ECG, hemoglobin, hematocrit, electrolytes, creatinine, glucose, coagulation studies  2  Change in medication regimen before surgery: none, continue medication regimen including morning of surgery, with sip of water    HPI    The following portions of the patient's history were reviewed and updated as appropriate: allergies, current medications, past family history, past medical history, past social history, past surgical history and problem list         Review of Systems Data to review:       Objective:    Vitals:    04/15/21 0759   BP: 134/72   BP Location: Left arm   Patient Position: Sitting   Cuff Size: Large   Pulse: 64   Resp: 16   Temp: 97 6 °F (36 4 °C)   TempSrc: Tympanic   Weight: 96 2 kg (212 lb)   Height: 6' 3" (1 905 m)        Physical Exam         BMI Counseling: Body mass index is 26 5 kg/m²  The BMI is above normal  Nutrition recommendations include reducing portion sizes, decreasing overall calorie intake, 3-5 servings of fruits/vegetables daily, reducing fast food intake and consuming healthier snacks  Exercise recommendations include exercising 3-5 times per week

## 2021-04-15 NOTE — PATIENT INSTRUCTIONS
Cleared for surgery, discussed as needed anxiety meds, will think about it and get back to me  Weight Management   AMBULATORY CARE:   Why it is important to manage your weight:  Being overweight increases your risk of health conditions such as heart disease, high blood pressure, type 2 diabetes, and certain types of cancer  It can also increase your risk for osteoarthritis, sleep apnea, and other respiratory problems  Aim for a slow, steady weight loss  Even a small amount of weight loss can lower your risk of health problems  How to lose weight safely:  A safe and healthy way to lose weight is to eat fewer calories and get regular exercise  · You can lose up about 1 pound a week by decreasing the number of calories you eat by 500 calories each day  You can decrease calories by eating smaller portion sizes or by cutting out high-calorie foods  Read labels to find out how many calories are in the foods you eat  · You can also burn calories with exercise such as walking, swimming, or biking  You will be more likely to keep weight off if you make these changes part of your lifestyle  Exercise at least 30 minutes per day on most days of the week  You can also fit in more physical activity by taking the stairs instead of the elevator or parking farther away from stores  Ask your healthcare provider about the best exercise plan for you  Healthy meal plan for weight management:  A healthy meal plan includes a variety of foods, contains fewer calories, and helps you stay healthy  A healthy meal plan includes the following:     · Eat whole-grain foods more often  A healthy meal plan should contain fiber  Fiber is the part of grains, fruits, and vegetables that is not broken down by your body  Whole-grain foods are healthy and provide extra fiber in your diet  Some examples of whole-grain foods are whole-wheat breads and pastas, oatmeal, brown rice, and bulgur  · Eat a variety of vegetables every day  Include dark, leafy greens such as spinach, kale, krissy greens, and mustard greens  Eat yellow and orange vegetables such as carrots, sweet potatoes, and winter squash  · Eat a variety of fruits every day  Choose fresh or canned fruit (canned in its own juice or light syrup) instead of juice  Fruit juice has very little or no fiber  · Eat low-fat dairy foods  Drink fat-free (skim) milk or 1% milk  Eat fat-free yogurt and low-fat cottage cheese  Try low-fat cheeses such as mozzarella and other reduced-fat cheeses  · Choose meat and other protein foods that are low in fat  Choose beans or other legumes such as split peas or lentils  Choose fish, skinless poultry (chicken or turkey), or lean cuts of red meat (beef or pork)  Before you cook meat or poultry, cut off any visible fat  · Use less fat and oil  Try baking foods instead of frying them  Add less fat, such as margarine, sour cream, regular salad dressing and mayonnaise to foods  Eat fewer high-fat foods  Some examples of high-fat foods include french fries, doughnuts, ice cream, and cakes  · Eat fewer sweets  Limit foods and drinks that are high in sugar  This includes candy, cookies, regular soda, and sweetened drinks  Ways to decrease calories:   · Eat smaller portions  ? Use a small plate with smaller servings  ? Do not eat second helpings  ? When you eat at a restaurant, ask for a box and place half of your meal in the box before you eat  ? Share an entrée with someone else  · Replace high-calorie snacks with healthy, low-calorie snacks  ? Choose fresh fruit, vegetables, fat-free rice cakes, or air-popped popcorn instead of potato chips, nuts, or chocolate  ? Choose water or calorie-free drinks instead of soda or sweetened drinks  · Do not shop for groceries when you are hungry  You may be more likely to make unhealthy food choices   Take a grocery list of healthy foods and shop after you have eaten     · Eat regular meals  Do not skip meals  Skipping meals can lead to overeating later in the day  This can make it harder for you to lose weight  Eat a healthy snack in place of a meal if you do not have time to eat a regular meal  Talk with a dietitian to help you create a meal plan and schedule that is right for you  Other things to consider as you try to lose weight:   · Be aware of situations that may give you the urge to overeat, such as eating while watching television  Find ways to avoid these situations  For example, read a book, go for a walk, or do crafts  · Meet with a weight loss support group or friends who are also trying to lose weight  This may help you stay motivated to continue working on your weight loss goals  © Copyright [address] Information is for End User's use only and may not be sold, redistributed or otherwise used for commercial purposes  All illustrations and images included in CareNotes® are the copyrighted property of [employer]  or [facility] Franki Mott   The above information is an  only  It is not intended as medical advice for individual conditions or treatments  Talk to your doctor, nurse or pharmacist before following any medical regimen to see if it is safe and effective for you

## 2021-05-27 NOTE — PROGRESS NOTES
PT Evaluation     Today's date: 2021  Patient name: Kenia Granados  : 1960  MRN: [id number]  Referring provider: Quinton Mercado MD  Dx:   Encounter Diagnosis     ICD-10-CM    1  Status post total left knee replacement  [id number]                   Assessment/Plan  Kenia Granados is a 56 y o  male presenting to outpatient physical therapy with L knee swelling, pain, range of motion loss, weakness, proprioceptive loss s/p L TKA on 21 with Dr Gerry Olmos  PMH is sig for GERD, B/L shoulder surgeries  Patient has c/o of pain and decreased ROM in flexion  Functional limitations are result of the above impairments, which limit his ability to drive, exercise or recreation, get off the toilet, get out of a chair, perform household chores, perform yard work, sleep, squat to  objects from the floor and walk  No further referral appears necessary at this time based upon examination results    The patients's greatest concerns are fear of not being able to keep active and future ill health (and wanting to prevent it)  Patient was given the opportunity to ask questions, and all questions were answered to the patient's satisfaction  Patient appears motivated, agrees with the POC, and is a good candidate for skilled physical therapy at this time  Patient was provided with handout of HEP consisting of quad sets, SLR, SAQ, heel slides, and gastroc stretching    Symptom irritability: Low Understanding of Dx/Px/POC: excellent  Prognosis: good      Goals  Short Term Goals:  1) Pain : Decrease R knee  pain to 3/10 at worst x 1 continuous week within 4 weeks  2) AROM: Improve R knee AROM to at least 120 degrees for flexion,  to have 0 degrees extension within 4 weeks  3) Strength:R  LE strength to be at least 4+/5 for all hip and knee within 4 weeks  4) Function: Improved FOTO score from IE within 4 weeks (30 at IE), patient to note greater ease of ambulation subjectively   Transition to not using AD for community ambulation within 3-4 weeks  LongTerm Goals:  1) Pain : Eliminate R knee pain  x 1 continuous week within 8 weeks  2) Swelling: Eliminate R knee swelling within 8-10 weeks  3) AROM: Improve R knee AROM to  0-130 degrees within 8 weeks  4) Strength: Improved R LE strength to 5/5 within 8 weeks  5) Function: Improved FOTO score to at least 61; Normal gait without AD, no reported difficulty with ADLs as they pertain to L knee within 8-10 weeks  6) (I) with HEP within 8 weeks  Plan  Plan details: Prognosis above is given PT services 2-3x/week tapering to 1x/week over the next 2 months and home program adherence  Patient would benefit from: skilled physical therapy  Referral necessary: no  Planned modality interventions: Hydrocollator packs, Cryotherapy and TENS  Planned therapy interventions: joint mobilization, manual therapy, massage, neuromuscular re-education, patient education, stretching, strengthening, therapeutic activities, therapeutic exercise, home exercise program, functional ROM exercises, gait training, flexibility, balance, abdominal trunk stabilization, motor coordination training, coordination and behavior modification  Frequency: 2-3x/week for 8 weeks  Duration in visits: [address] beginning date: 5/28/2021  Plan of Care expiration date: 07/23/2021  Treatment plan discussed with: patient    Subjective  Date of Surgery: 05/05/2021    Patient is a 56 y o  male who presents for an initial outpatient physical therapy consultation s/p L TKA on 5/5/21 with Dr Vladimir Dominguez  Current c/o L knee pain and stretching with bending his knee or getting up out of chair  Relevant details:   Patient reports that surgery went well  Patient has had home physical therapy for 2 5 weeks post-op  He thinks that he is starting to be able to get around easier  He states that he is still having difficulty with getting up and down out of a chair or getting in/ out of a car   Patient prefers sleeping on his left side and has had trouble getting comfortable at night  Patient has a full set of stairs at home, and has been ascending/descending non-reciprocally, "up with the good, down with the bad"  Patient currently taken celebrex and tylenol  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 No    Pain  Best: 2  Worst: 6  Location: lateral quad   Quality:  Sore with occasional "spikes of pain"   Aggravating factors: Working out, stretching, trying to get comfortable in bed  Alleviating factors: Rest, stop painful activities, ice   Progression: Improved    Social Support  Lives with: Wife  Home setup: multi-level home  Steps into house: 1  Steps in house: 13    Employment status: working, [employer]  Hobbies/Recreational Activities: Walking, walking dog, riding motorcycle  Currently unable to participate in these activities because he doesn't trust his knee  Treatments:  Previous treatments: home therapy, surgery, medications, CSI  Current treatments: physical therapy      Patient Goals for Therapy  Get rid of pain, walk normally, sleep better    Objective     Observations   Left Knee   Positive for edema and incision       Additional Observation Details  Incision appears well healing without s/s infection    Tenderness     Additional Tenderness Details  Patient denies neuro symptoms     Neurological Testing     Sensation     Knee   Left Knee   Intact: light touch    Right Knee   Intact: light touch     Additional Neurological Details  Patient denies neuro symptoms    Active Range of Motion   Left Knee   Flexion: 75 degrees   Extension: -10 degrees     Right Knee   Normal active range of motion    Passive Range of Motion   Left Knee   Flexion: 80 degrees   Extension: -6 degrees     Right Knee   Normal passive range of motion    Mobility   Patellar Mobility:   Left Knee   Hypomobile: left medial, left lateral, left superior and left inferior    Additional Mobility Details  L patella hypomobile superior/inferior > medial/lateral     Strength/Myotome Testing     Left Hip   Planes of Motion   Flexion: 4  Abduction: 4-    Right Hip   Planes of Motion   Flexion: 5  Abduction: 4+    Left Knee   Flexion: 3+  Extension: 3+  Quadriceps contraction: poor    Right Knee   Normal strength  Flexion: 5  Extension: 5  Quadriceps contraction: good    Left Ankle/Foot   Dorsiflexion: 4+    Right Ankle/Foot   Dorsiflexion: 5    Ambulation     Ambulation: Stairs   Ascend stairs: independent  Pattern: non-reciprocal  Railings: one rail  Descend stairs: independent  Pattern: non-reciprocal  Railings: one rail    Additional Stairs Ambulation Details  Patient used UE support and SPC to ascend/descend  Observational Gait   Gait: antalgic   Decreased walking speed, stride length, left stance time and right step length       Additional Observational Gait Details  Patient ambulates with SPC, noting decreased knee flexion thought swing phase and decreased extension upon initial contact    General Comments:      Knee Comments  TUG: w/SPC and UE support upon standing/sitting: 15 5s             Precautions: n/a      Manuals 05/28            PROM knee             Patella mob             Extension mob                          Neuro Re-Ed             [employer]             HS set                                                                              Ther Ex             bike 5 min rocking            Heel slides HEP            gastroc stretching HEP            SLR HEP            SAQ HEP            LAQ                                       Ther Activity                                       Gait Training                                       Modalities             Ice              IE/HEP

## 2021-05-28 ENCOUNTER — TRANSCRIBE ORDERS (OUTPATIENT)
Dept: PHYSICAL THERAPY | Facility: CLINIC | Age: 61
End: 2021-05-28

## 2021-05-28 ENCOUNTER — EVALUATION (OUTPATIENT)
Dept: PHYSICAL THERAPY | Facility: CLINIC | Age: 61
End: 2021-05-28
Payer: COMMERCIAL

## 2021-05-28 DIAGNOSIS — Z96.652 STATUS POST TOTAL LEFT KNEE REPLACEMENT: Primary | ICD-10-CM

## 2021-05-28 PROCEDURE — 97110 THERAPEUTIC EXERCISES: CPT | Performed by: PHYSICAL THERAPIST

## 2021-05-28 PROCEDURE — 97161 PT EVAL LOW COMPLEX 20 MIN: CPT | Performed by: PHYSICAL THERAPIST

## 2021-05-28 NOTE — LETTER
May 28, 2021    MD Kurt Johnson [phone]    Patient: Dayanna Kent   YOB: 1960   Date of Visit: 2021     Encounter Diagnosis     ICD-10-CM    1  Status post total left knee replacement  [id number]        Dear Dr Walter Eagle: Thank you for your recent referral of Dayanna Kent  Please review the attached evaluation summary from Leonardo's recent visit  Please verify that you agree with the plan of care by signing the attached order  If you have any questions or concerns, please do not hesitate to call  I sincerely appreciate the opportunity to share in the care of one of your patients and hope to have another opportunity to work with you in the near future  Sincerely,    Eboni Herr, PT      Referring Provider:      I certify that I have read the below Plan of Care and certify the need for these services furnished under this plan of treatment while under my care  MD Kurt Johnson [phone]  Via Fax: [phone]          PT Evaluation     Today's date: 2021  Patient name: Dayanna Kent  : [birth year]  MRN: [id number]  Referring provider: Todd Seaman MD  Dx:   Encounter Diagnosis     ICD-10-CM    1  Status post total left knee replacement  Z96 [id number]                   Assessment/Plan  Dayanna Kent is a 56 y o  male presenting to outpatient physical therapy with L knee swelling, pain, range of motion loss, weakness, proprioceptive loss s/p L TKA on 21 with Dr Walter Eagle  PMH is sig for GERD, B/L shoulder surgeries  Patient has c/o of pain and decreased ROM in flexion  Functional limitations are result of the above impairments, which limit his ability to drive, exercise or recreation, get off the toilet, get out of a chair, perform household chores, perform yard work, sleep, squat to  objects from the floor and walk    No further referral appears necessary at this time based upon examination results    The patients's greatest concerns are fear of not being able to keep active and future ill health (and wanting to prevent it)  Patient was given the opportunity to ask questions, and all questions were answered to the patient's satisfaction  Patient appears motivated, agrees with the POC, and is a good candidate for skilled physical therapy at this time  Patient was provided with handout of HEP consisting of quad sets, SLR, SAQ, heel slides, and gastroc stretching    Symptom irritability: Low Understanding of Dx/Px/POC: excellent  Prognosis: good      Goals  Short Term Goals:  1) Pain : Decrease R knee  pain to 3/10 at worst x 1 continuous week within 4 weeks  2) AROM: Improve R knee AROM to at least 120 degrees for flexion,  to have 0 degrees extension within 4 weeks  3) Strength:R  LE strength to be at least 4+/5 for all hip and knee within 4 weeks  4) Function: Improved FOTO score from IE within 4 weeks (30 at IE), patient to note greater ease of ambulation subjectively  Transition to not using AD for community ambulation within 3-4 weeks  LongTerm Goals:  1) Pain : Eliminate R knee pain  x 1 continuous week within 8 weeks  2) Swelling: Eliminate R knee swelling within 8-10 weeks  3) AROM: Improve R knee AROM to  0-130 degrees within 8 weeks  4) Strength: Improved R LE strength to 5/5 within 8 weeks  5) Function: Improved FOTO score to at least 61; Normal gait without AD, no reported difficulty with ADLs as they pertain to L knee within 8-10 weeks  6) (I) with HEP within 8 weeks  Plan  Plan details: Prognosis above is given PT services 2-3x/week tapering to 1x/week over the next 2 months and home program adherence    Patient would benefit from: skilled physical therapy  Referral necessary: no  Planned modality interventions: Hydrocollator packs, Cryotherapy and TENS  Planned therapy interventions: joint mobilization, manual therapy, massage, neuromuscular re-education, patient education, stretching, strengthening, therapeutic activities, therapeutic exercise, home exercise program, functional ROM exercises, gait training, flexibility, balance, abdominal trunk stabilization, motor coordination training, coordination and behavior modification  Frequency: 2-3x/week for 8 weeks  Duration in visits: 16  Plan of Care beginning date: 5/28/2021  Plan of Care expiration date: 07/23/2021  Treatment plan discussed with: patient    Subjective  Date of Surgery: 05/05/2021    Patient is a 56 y o  male who presents for an initial outpatient physical therapy consultation s/p L TKA on 5/5/21 with Dr Ruddy Young  Current c/o L knee pain and stretching with bending his knee or getting up out of chair  Relevant details:   Patient reports that surgery went well  Patient has had home physical therapy for 2 5 weeks post-op  He thinks that he is starting to be able to get around easier  He states that he is still having difficulty with getting up and down out of a chair or getting in/ out of a car  Patient prefers sleeping on his left side and has had trouble getting comfortable at night  Patient has a full set of stairs at home, and has been ascending/descending non-reciprocally, "up with the good, down with the bad"  Patient currently taken celebrex and tylenol  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 No    Pain  Best: 2  Worst: 6  Location: lateral quad   Quality:  Sore with occasional "spikes of pain"   Aggravating factors: Working out, stretching, trying to get comfortable in bed  Alleviating factors: Rest, stop painful activities, ice   Progression: Improved    Social Support  Lives with: Wife  Home setup: multi-level home  Steps into house: 1  Steps in house: 13    Employment status: working, [employer]     Hobbies/Recreational Activities: Walking, walking dog, riding motorcycle  Currently unable to participate in these activities because he doesn't trust his knee  Treatments:  Previous treatments: home therapy, surgery, medications, CSI  Current treatments: physical therapy      Patient Goals for Therapy  Get rid of pain, walk normally, sleep better    Objective     Observations   Left Knee   Positive for edema and incision  Additional Observation Details  Incision appears well healing without s/s infection    Tenderness     Additional Tenderness Details  Patient denies neuro symptoms     Neurological Testing     Sensation     Knee   Left Knee   Intact: light touch    Right Knee   Intact: light touch     Additional Neurological Details  Patient denies neuro symptoms    Active Range of Motion   Left Knee   Flexion: 75 degrees   Extension: -10 degrees     Right Knee   Normal active range of motion    Passive Range of Motion   Left Knee   Flexion: 80 degrees   Extension: -6 degrees     Right Knee   Normal passive range of motion    Mobility   Patellar Mobility:   Left Knee   Hypomobile: left medial, left lateral, left superior and left inferior    Additional Mobility Details  L patella hypomobile superior/inferior > medial/lateral     Strength/Myotome Testing     Left Hip   Planes of Motion   Flexion: 4  Abduction: 4-    Right Hip   Planes of Motion   Flexion: 5  Abduction: 4+    Left Knee   Flexion: 3+  Extension: 3+  Quadriceps contraction: poor    Right Knee   Normal strength  Flexion: 5  Extension: 5  Quadriceps contraction: good    Left Ankle/Foot   Dorsiflexion: 4+    Right Ankle/Foot   Dorsiflexion: 5    Ambulation     Ambulation: Stairs   Ascend stairs: independent  Pattern: non-reciprocal  Railings: one rail  Descend stairs: independent  Pattern: non-reciprocal  Railings: one rail    Additional Stairs Ambulation Details  Patient used UE support and SPC to ascend/descend       Observational Gait   Gait: antalgic   Decreased walking speed, stride length, left stance time and right step length       Additional Observational Gait Details  Patient ambulates with SPC, noting decreased knee flexion thought swing phase and decreased extension upon initial contact    General Comments:      Knee Comments  TUG: w/SPC and UE support upon standing/sitting: 15 5s             Precautions: n/a      Manuals 05/28            PROM knee             Patella mob             Extension mob                          Neuro Re-Ed             [employer]             HS set                                                                              Ther Ex             bike 5 min rocking            Heel slides HEP            gastroc stretching HEP            SLR HEP            SAQ HEP            LAQ                                       Ther Activity                                       Gait Training                                       Modalities             Ice              IE/HEP

## 2021-06-02 ENCOUNTER — OFFICE VISIT (OUTPATIENT)
Dept: PHYSICAL THERAPY | Facility: CLINIC | Age: 61
End: 2021-06-02
Payer: COMMERCIAL

## 2021-06-02 DIAGNOSIS — Z96.652 STATUS POST TOTAL LEFT KNEE REPLACEMENT: Primary | ICD-10-CM

## 2021-06-02 PROCEDURE — 97140 MANUAL THERAPY 1/> REGIONS: CPT

## 2021-06-02 PROCEDURE — 97110 THERAPEUTIC EXERCISES: CPT

## 2021-06-02 NOTE — PROGRESS NOTES
Daily Note     Today's date: 2021  Patient name: Maryam Jansen  : 1960  MRN: [id number]  Referring provider: Zofia Torres MD  Dx:   Encounter Diagnosis     ICD-10-CM    1  Status post total left knee replacement  [id number]                 DOS: 21      Subjective: Now 4 weeks post-op  Patient reported riding bike at home and pushed to far and had increased pain as a result  Has been using SPC most of the time, sometimes forgetting  Swelling in calf and ankle seem to fluccuate, worse when on feet for longer periods  Objective: See treatment diary below  L knee flex measured with overpressure at 92°  Lacking 1-2° of ext after manual (compared to -6° passively at eval)  Assessment: See knee flex measurement above  [facility] Tubigrip for pitting edema and advised patient to wear throughout the day, not use at night, and take breaks throughout the day to elevate LE  Ext lag present with SLRs due to quad weakness  Educated on seated knee flex stretch at EOT with overpressure for HEP to work more on flex deficits  Plan: Continue per plan of care  Progress treatment as tolerated              Precautions: n/a    Manuals            PROM knee  EH           Patella mob  PROM -  EH           Extension mob                          Neuro Re-Ed             Quad set             HS set                                                                              Ther Ex             bike 5 min rocking 5 min rock           Heel slides HEP 10"x  10           gastroc stretching HEP NV           SLR HEP 2x10           SAQ HEP 2x10           LAQ  2x10           Knee flex stretch off EOT  10"x 10                        Ther Activity                                       Gait Training                                       Modalities             Ice   10 min post elevated            IE/HEP

## 2021-06-04 ENCOUNTER — OFFICE VISIT (OUTPATIENT)
Dept: PHYSICAL THERAPY | Facility: CLINIC | Age: 61
End: 2021-06-04
Payer: COMMERCIAL

## 2021-06-04 DIAGNOSIS — Z96.652 STATUS POST TOTAL LEFT KNEE REPLACEMENT: Primary | ICD-10-CM

## 2021-06-04 PROCEDURE — 97110 THERAPEUTIC EXERCISES: CPT | Performed by: PHYSICAL THERAPIST

## 2021-06-04 PROCEDURE — 97140 MANUAL THERAPY 1/> REGIONS: CPT | Performed by: PHYSICAL THERAPIST

## 2021-06-04 NOTE — PROGRESS NOTES
Daily Note     Today's date: 2021  Patient name: Jose Mendez  : 1960  MRN: [id number]  Referring provider: Fatimah Moya MD  Dx:   Encounter Diagnosis     ICD-10-CM    1  Status post total left knee replacement  [id number] [id number]                   Subjective: Patient reports feeling stiff today  States that he got a good workout on Wednesday  Objective: See treatment diary below  Progressed treatment as indicated below    Assessment: Patient tolerate treatment well today  PROM knee flexion appears to be improving  Quad lag continues to be noted with SLR and LAQ exercises  Added a self-assisted seated knee flexion stretch for home  Progress exercises as tolerated  Patient will continue to benefit from skilled PT in order to address impairments and improve function  Plan: Continue per plan of care        Precautions: n/a    Manuals           PROM knee  EH JF          Patella mob  PROM -  EH JF          Extension mob   JF  Gr 3          Mod annalisa distraction mob   JF   Gr 3          Neuro Re-Ed             Quad set   5"x15          HS set   5"x15                                                                           Ther Ex             bike 5 min rocking 5 min rock 5 min          Heel slides HEP 10"x  10 10"x10          gastroc stretching HEP NV W/ strap  5x20"          SLR HEP 2x10 2x10          SAQ HEP 2x10 3" hold  2x10          LAQ  2x10 2x10          Knee flex stretch off EOT  10"x 10 10" x10          Seated knee flexion   Self-  Assist  x15          Ther Activity                                       Gait Training                                       Modalities             Ice   10 min post elevated            IE/HEP

## 2021-06-07 ENCOUNTER — OFFICE VISIT (OUTPATIENT)
Dept: PHYSICAL THERAPY | Facility: CLINIC | Age: 61
End: 2021-06-07
Payer: COMMERCIAL

## 2021-06-07 DIAGNOSIS — Z96.652 STATUS POST TOTAL LEFT KNEE REPLACEMENT: Primary | ICD-10-CM

## 2021-06-07 PROCEDURE — 97140 MANUAL THERAPY 1/> REGIONS: CPT

## 2021-06-07 PROCEDURE — 97110 THERAPEUTIC EXERCISES: CPT

## 2021-06-07 PROCEDURE — 97112 NEUROMUSCULAR REEDUCATION: CPT

## 2021-06-07 NOTE — PROGRESS NOTES
Daily Note     Today's date: 2021  Patient name: Francisco Javier Woody  : 1960  MRN: [id number]  Referring provider: Leatha Maddox MD  Dx:   Encounter Diagnosis     ICD-10-CM    1  Status post total left knee replacement  [id number]                 DOS: 21      Subjective: Now 4 weeks, 5 days post-op  Patient reported he may have did too much over the weekend  He was in pool and played volleyball with caution to avoid quick movement  He continues to have discomfort distal to patella and through lateral aspect of knee  Objective: See treatment diary below  Assessment: L knee ROM making visual gains in flex and ext, pain at end range flex  IASTM to distal quad and ITB showed presence of soft tissue restrictions, factors to decreased knee flex and lateral tightness  Still lacking quad strength with inability to maintain terminal ext with increasing lag throughout SLR and LAQ  Will continue to work towards gaining more functional flex and addressing weakness that is hindering patient from discontinuing use of SPC and increase quality of life  Plan: Continue per plan of care             Precautions: n/a    Manuals          PROM knee  EH JF EH         Patella mob  PROM -  EH JF PROM - EH         Extension mob   JF  Gr 3          Mod annalisa distraction mob   JF   Gr 3          IASTM to L distal quad and ITB    EH                      Neuro Re-Ed             Quad set   5"x15 5"x15         HS set   5"x15                                                                           Ther Ex             bike 5 min rocking 5 min rock 5 min 5 min  rock         Heel slides HEP 10"x  10 10"x10 10"x  10         gastroc stretching HEP NV W/ strap  5x20" Strap  20"x5         SLR HEP 2x10 2x10 2x10         SAQ HEP 2x10 3" hold  2x10 3" hold, 2x10         LAQ  2x10 2x10 2x10         Knee flex stretch off EOT  10"x 10 10" x10          Seated knee flexion   Self-  Assist  x15          Knee flex stretch on leg press     10"x   10         Ther Activity                                       Gait Training                                       Modalities             Ice   10 min post elevated   10 min post         IE/HEP

## 2021-06-11 ENCOUNTER — OFFICE VISIT (OUTPATIENT)
Dept: PHYSICAL THERAPY | Facility: CLINIC | Age: 61
End: 2021-06-11
Payer: COMMERCIAL

## 2021-06-11 DIAGNOSIS — Z96.652 STATUS POST TOTAL LEFT KNEE REPLACEMENT: Primary | ICD-10-CM

## 2021-06-11 PROCEDURE — 97110 THERAPEUTIC EXERCISES: CPT | Performed by: PHYSICAL THERAPIST

## 2021-06-11 PROCEDURE — 97140 MANUAL THERAPY 1/> REGIONS: CPT | Performed by: PHYSICAL THERAPIST

## 2021-06-11 NOTE — PROGRESS NOTES
Daily Note     Today's date: 2021  Patient name: Shelbi Salgado  : 1960  MRN: [id number]  Referring provider: Sherron Pierson MD   Dx:   Encounter Diagnosis     ICD-10-CM    1  Status post total left knee replacement  [id number] [id number]                   Subjective: Patient reports that he is having more pain today  Did not sleep well last night  Objective: See treatment diary below  Held progression due to patient soreness  Assessment: Patient was able to tolerate treatment today with only a mild increase in pain  PROM improving slowly  Continue to progress knee ROM and strength as tolerate  Begin with weightbearing strength exercises next session  Patient will continue to benefit from skilled PT in order to address impairments and improve function  Plan: Continue per plan of care  Progress treatment as tolerated         Precautions: n/a    Manuals         PROM knee  EH JF EH JF        Patella mob  PROM -  EH JF PROM - EH JF        Extension mob   JF  Gr 3  JF  Gr 3        Mod annalisa distraction mob   JF   Gr 3  nv        IASTM to L distal quad and ITB    EH                      Neuro Re-Ed             Quad set   5"x15 5"x15 5" x20        HS set   5"x15                                                                           Ther Ex             bike 5 min rocking 5 min rock 5 min 5 min  rock 5 min rocking        Heel slides HEP 10"x  10 10"x10 10"x  10 10"x10        gastroc stretching HEP NV W/ strap  5x20" Strap  20"x5 nv        SLR HEP 2x10 2x10 2x10 2x10        SAQ HEP 2x10 3" hold  2x10 3" hold, 2x10 3" hold  2x10  1#        LAQ  2x10 2x10 2x10 1#  2x10        Knee flex stretch off EOT  10"x 10 10" x10  10"x10        Seated knee flexion   Self-  Assist  x15          Knee flex stretch on leg press     10"x   10 nv        Ther Activity                                       Gait Training                                       Modalities             Ice   10 min post elevated   10 min post 10 min post        IE/HEP

## 2021-06-14 ENCOUNTER — OFFICE VISIT (OUTPATIENT)
Dept: PHYSICAL THERAPY | Facility: CLINIC | Age: 61
End: 2021-06-14
Payer: COMMERCIAL

## 2021-06-14 DIAGNOSIS — Z96.652 STATUS POST TOTAL LEFT KNEE REPLACEMENT: Primary | ICD-10-CM

## 2021-06-14 PROCEDURE — 97112 NEUROMUSCULAR REEDUCATION: CPT

## 2021-06-14 PROCEDURE — 97140 MANUAL THERAPY 1/> REGIONS: CPT

## 2021-06-14 PROCEDURE — 97110 THERAPEUTIC EXERCISES: CPT

## 2021-06-14 NOTE — PROGRESS NOTES
Daily Note     Today's date: 2021  Patient name: Kelvin Thorpe  : 1960  MRN: [id number]  Referring provider: Emanuel Grullon MD   Dx:   Encounter Diagnosis     ICD-10-CM    1  Status post total left knee replacement  [id number] [id number]                   Subjective: Patient reported less pain compared to previous visit  Now more bothered by stiffness vs pain  He has not been using SPC however does keep in the car in case he feels he needs it  Swelling does fluctuate in L LE / ankle  Yesterday he noticed more after being on his feet for some time, elevating and using Tubigrip which helped  Objective: See treatment diary below  Added standing gastroc stretch and mini squats to HEP  Assessment: PROM making visual progress, still with pain and tissue restrictions into end range flex  At rest prior to manual still lacking a few degrees shy of terminal ext  Quad lag remains present with SLR and long and SAQ due to unresolved quad weakness  Did add mini squats and steps to work more on functional strength with good tolerance, limited eccentric control with steps  Will continue to benefit from progressions of weight bearing activities as able  Plan: Continue per plan of care  Progress treatment as tolerated              Precautions: n/a    Manuals        PROM knee  EH JF EH JF EH       Patella mob  PROM -  EH JF PROM - EH JF PROM - EH       Extension mob   JF  Gr 3  JF  Gr 3        Mod annalisa distraction mob   JF   Gr 3  nv NV       IASTM to L distal quad and ITB    EH                      Neuro Re-Ed             Quad set   5"x15 5"x15 5" x20 5"x20       HS set   5"x15                                                                           Ther Ex             bike 5 min rocking 5 min rock 5 min 5 min  rock 5 min rocking 5 min  rock       Heel slides HEP 10"x  10 10"x10 10"x  10 10"x10 10"x  10       gastroc stretching HEP NV W/ strap  5x20" Strap  20"x5 nv UBE slant  30"x3 SLR HEP 2x10 2x10 2x10 2x10 2x10       SAQ HEP 2x10 3" hold  2x10 3" hold, 2x10 3" hold  2x10  1# 3" hold, 2x10 1#      LAQ  2x10 2x10 2x10 1#  2x10 1#  2x10       Knee flex stretch off EOT  10"x 10 10" x10  10"x10 10"x  10       Seated knee flexion   Self-  Assist  x15          Knee flex stretch on leg press     10"x   10 nv        Mini squats at wall with hand support       2x10        Step ups       6"   2x10                    Ther Activity                                       Gait Training                                       Modalities             Ice   10 min post elevated   10 min post 10 min post 10 min post       IE/HEP

## 2021-06-17 ENCOUNTER — OFFICE VISIT (OUTPATIENT)
Dept: PHYSICAL THERAPY | Facility: CLINIC | Age: 61
End: 2021-06-17
Payer: COMMERCIAL

## 2021-06-17 DIAGNOSIS — Z96.652 STATUS POST TOTAL LEFT KNEE REPLACEMENT: Primary | ICD-10-CM

## 2021-06-17 PROCEDURE — 97140 MANUAL THERAPY 1/> REGIONS: CPT

## 2021-06-17 PROCEDURE — 97110 THERAPEUTIC EXERCISES: CPT

## 2021-06-17 NOTE — PROGRESS NOTES
Daily Note     Today's date: 2021  Patient name: Meenakshi Sandoval  : 1960  MRN: [id number]  Referring provider: Ashanti Carter MD   Dx:   Encounter Diagnosis     ICD-10-CM    1  Status post total left knee replacement  Z96 [id number]                   Subjective: Patient reported getting continued "clicking" over distal quad and pain distal to patella  He was cleaning a friend's pool and felt good during, increased soreness after  Feels he is making gains with flex by ability to put shoe on easier  Objective: See treatment diary below  Assessment: PROM making progress (shown above), still with pain and tissue restrictions into end range flex  At rest prior to manual still lacking a few degrees shy of terminal ext  Quad lag remains present with SLR and long and SAQ due to unresolved quad weakness  Will continue to benefit from progressions of weight bearing activities as able  Plan: Continue per plan of care  Progress treatment as tolerated              Precautions: n/a    Manuals       PROM knee  [address] [facility] JF [address] [facility] JF EH EH      Patella mob  PROM -  EH JF PROM - EH JF PROM - EH PROM - EH      Extension mob   JF  Gr 3  JF  Gr 3        Mod annalisa distraction mob   JF   Gr 3  nv NV       IASTM to L distal quad and ITB    EH                      Neuro Re-Ed             Quad set   5"x15 5"x15 5" x20 5"x20       HS set   5"x15                                                                           Ther Ex             bike 5 min rocking 5 min rock 5 min 5 min  rock 5 min rocking 5 min  rock 5 min rock      Heel slides HEP 10"x  10 10"x10 10"x  10 10"x10 10"x  10 10"x  10      gastroc stretching HEP NV W/ strap  5x20" Strap  20"x5 nv UBE slant  30"x3 UBE slant  30"x3      SLR HEP 2x10 2x10 2x10 2x10 2x10 2x10      SAQ HEP 2x10 3" hold  2x10 3" hold, 2x10 3" hold  2x10  1# 3" hold, 2x10 1 5#  3" hold, 2x10      LAQ  2x10 2x10 2x10 1#  2x10 1#  2x10 1 5#  2x10      Knee flex stretch off EOT  10"x 10 10" x10  10"x10 10"x  10       Seated knee flexion   Self-  Assist  x15          Knee flex stretch on leg press     10"x   10 nv        Mini squats at wall with hand support       2x10  2x10      Step ups       6"   2x10  6"   2x10                   Ther Activity                                       Gait Training                                       Modalities             Ice   10 min post elevated   10 min post 10 min post 10 min post 10 min post      IE/HEP

## 2021-06-21 ENCOUNTER — OFFICE VISIT (OUTPATIENT)
Dept: PHYSICAL THERAPY | Facility: CLINIC | Age: 61
End: 2021-06-21
Payer: COMMERCIAL

## 2021-06-21 DIAGNOSIS — Z96.652 STATUS POST TOTAL LEFT KNEE REPLACEMENT: Primary | ICD-10-CM

## 2021-06-21 PROCEDURE — 97110 THERAPEUTIC EXERCISES: CPT | Performed by: PHYSICAL THERAPIST

## 2021-06-21 PROCEDURE — 97140 MANUAL THERAPY 1/> REGIONS: CPT | Performed by: PHYSICAL THERAPIST

## 2021-06-21 NOTE — PROGRESS NOTES
Daily Note     Today's date: 2021  Patient name: Derrick Samuel  : 1960  MRN: [id number]  Referring provider: Christy Posada MD  Dx:   Encounter Diagnosis     ICD-10-CM    1  Status post total left knee replacement  [id number] [id number]                   Subjective: Patient reports feeling more sore today secondary to walking more yesterday at a father's day picnic  Objective: See treatment diary below  Held progressions due to patient soreness  Assessment: Patient was able to tolerate session without an increase in pain  Patient demonstrates improved knee flexion PROM today  He continues to be challenged by active terminal knee extension  Added TKE at [employer] column this session to address this deficit  Continue to progress as tolerated  Patient will continue to benefit from skilled PT in order to address impairments and improve function  Plan: Continue per plan of care  Progress treatment as tolerated         Precautions: n/a    Manuals      PROM knee  [facility] JF EH EH JF     Patella mob  PROM -  EH JF PROM - EH JF PROM - EH PROM - EH JF     Extension mob   JF  Gr 3  JF  Gr 3   JF     Mod annalisa distraction mob   JF   Gr 3  nv NV  JF  Gr 3     IASTM to L distal quad and ITB    EH                      Neuro Re-Ed             Quad set   5"x15 5"x15 5" x20 5"x20       HS set   5"x15                                                                           Ther Ex             bike 5 min rocking 5 min rock 5 min 5 min  rock 5 min rocking 5 min  rock 5 min rock 5 min rock     Heel slides HEP 10"x  10 10"x10 10"x  10 10"x10 10"x  10 10"x  10 10"x10     gastroc stretching HEP NV W/ strap  5x20" Strap  20"x5 nv UBE slant  30"x3 UBE slant  30"x3 UBE slant  30"x3     SLR HEP 2x10 2x10 2x10 2x10 2x10 2x10 nv     SAQ HEP 2x10 3" hold  2x10 3" hold, 2x10 3" hold  2x10  1# 3" hold, 2x10 1 5#  3" hold, 2x10 2#  3" hold  2x10     LAQ  2x10 2x10 2x10 1#  2x10 1#  2x10 1 5#  2x10 2#  2x10     Knee flex stretch off EOT  10"x 10 10" x10  10"x10 10"x  10  nv     Seated knee flexion   Self-  Assist  x15          Knee flex stretch on leg press     10"x   10 nv        Mini squats at wall with hand support       2x10  2x10 nv     Step ups       6"   2x10  6"   2x10 nv     TKE        Juan  10#       Ther Activity                                       Gait Training                                       Modalities             Ice   10 min post elevated   10 min post 10 min post 10 min post 10 min post      IE/HEP

## 2021-06-24 ENCOUNTER — OFFICE VISIT (OUTPATIENT)
Dept: PHYSICAL THERAPY | Facility: CLINIC | Age: 61
End: 2021-06-24
Payer: COMMERCIAL

## 2021-06-24 DIAGNOSIS — Z96.652 STATUS POST TOTAL LEFT KNEE REPLACEMENT: Primary | ICD-10-CM

## 2021-06-24 PROCEDURE — 97110 THERAPEUTIC EXERCISES: CPT | Performed by: PHYSICAL THERAPIST

## 2021-06-24 PROCEDURE — 97140 MANUAL THERAPY 1/> REGIONS: CPT | Performed by: PHYSICAL THERAPIST

## 2021-06-24 NOTE — PROGRESS NOTES
Daily Note     Today's date: 2021  Patient name: Sapphire Burns  : 1960  MRN: [id number]  Referring provider: Chela Desai MD  Dx:   Encounter Diagnosis     ICD-10-CM    1  Status post total left knee replacement  Z96 652                   Subjective: Pt reports he continues to feel soreness in the knee below the patella  Objective: See treatment diary below  R knee PROM 112*    Assessment: Pt continues to demonstrate progress with R knee flexion as demonstrated by objective measure above  Pt was able to perform one full rotation on the bike with minimal increase in pain; pt educated on performing the bike at home as part of his HEP with full revolutions if able  Mild extension lag present with SLR however decreases with verbal cues to perform a quad set prior to performing SLR  Pt progressed to unilateral UE support on step ups; pt tolerated well however demonstrated increased fatigue  Plan: Continue per plan of care        Precautions: n/a    Manuals     PROM knee  EH JF EH JF EH EH JF AG    Patella mob  PROM -  EH JF PROM - EH JF PROM - EH PROM - EH JF AG    Extension mob   JF  Gr 3  JF  Gr 3   JF AG    Mod annalisa distraction mob   JF   Gr 3  nv NV  JF  Gr 3 AG  Gr 3    IASTM to L distal quad and ITB    EH                      Neuro Re-Ed             Quad set   5"x15 5"x15 5" x20 5"x20       HS set   5"x15                                                                           Ther Ex             bike 5 min rocking 5 min rock 5 min 5 min  rock 5 min rocking 5 min  rock 5 min rock 5 min rock 5 min rock    Heel slides HEP 10"x  10 10"x10 10"x  10 10"x10 10"x  10 10"x  10 10"x10 10"x10    gastroc stretching HEP NV W/ strap  5x20" Strap  20"x5 nv UBE slant  30"x3 UBE slant  30"x3 UBE slant  30"x3 UBE slant  30"x3    SLR HEP 2x10 2x10 2x10 2x10 2x10 2x10 nv 2x10    SAQ HEP 2x10 3" hold  2x10 3" hold, 2x10 3" hold  2x10  1# 3" hold, 2x10 1 5#  3" hold, 2x10 2#  3" hold  2x10 2#  3" hold   2x10    LAQ  2x10 2x10 2x10 1#  2x10 1#  2x10 1 5#  2x10 2#  2x10 2#  2x10    Knee flex stretch off EOT  10"x 10 10" x10  10"x10 10"x  10  nv nv      Seated knee flexion   Self-  Assist  x15          Knee flex stretch on leg press     10"x   10 nv        Mini squats at wall with hand support       2x10  2x10 nv 2x10 w/ cane on wall    Step ups       6"   2x10  6"   2x10 nv 6"   2x10  Unilateral UE support     TKE        [place]  10#   [facility]  10#  2x10    Ther Activity                                       Gait Training                                       Modalities             Ice   10 min post elevated   10 min post 10 min post 10 min post 10 min post      IE/HEP

## 2021-06-28 ENCOUNTER — OFFICE VISIT (OUTPATIENT)
Dept: PHYSICAL THERAPY | Facility: CLINIC | Age: 61
End: 2021-06-28
Payer: COMMERCIAL

## 2021-06-28 DIAGNOSIS — Z96.652 STATUS POST TOTAL LEFT KNEE REPLACEMENT: Primary | ICD-10-CM

## 2021-06-28 PROCEDURE — 97140 MANUAL THERAPY 1/> REGIONS: CPT | Performed by: PHYSICAL THERAPIST

## 2021-06-28 PROCEDURE — 97110 THERAPEUTIC EXERCISES: CPT | Performed by: PHYSICAL THERAPIST

## 2021-06-28 NOTE — PROGRESS NOTES
Daily Note     Today's date: 2021  Patient name: Marianne Du  : 1960  MRN: [id number]  Referring provider: Rosina Pickard MD  Dx:   Encounter Diagnosis     ICD-10-CM    1  Status post total left knee replacement  Z96 652                   Subjective: Patient reports that his knee is feeling pretty good today  Was able to walk around in his pool this weekend  Objective: See treatment diary below  Progressed treatment as indicated below  Added TKE for HEP  FOTO: 55 (was 28)   AAROM L knee flexion: 115 deg    Assessment: Patient was able to tolerate progression of resistance exercises without an increase in pain  They demonstrated muscular fatigue as expected with progression  He demonstrates improved functional strength, but continue to demonstrate a quad lag with SLRs  Continue to progress strengthen exercises, especially terminal knee extension, as tolerated  Patient will continue to benefit from skilled PT in order to address impairments and improve function  Plan: Continue per plan of care  Progress treatment as tolerated         Precautions: n/a    Manuals    PROM L knee  EH JF EH JF EH EH JF AG JF   Patella mob  PROM -  EH JF PROM - EH JF PROM - EH PROM - EH JF AG JF   Extension mob   JF  Gr 3  JF  Gr 3   JF AG JF   Mod annalisa distraction mob   JF   Gr 3  nv NV  JF  Gr 3 AG  Gr 3 JF  Gr 3   IASTM to L distal quad and ITB    EH                      Neuro Re-Ed             Quad set   5"x15 5"x15 5" x20 5"x20       HS set   5"x15                                                                           Ther Ex             bike 5 min rocking 5 min rock 5 min 5 min  rock 5 min rocking 5 min  rock 5 min rock 5 min rock 5 min rock 7 min    Heel slides HEP 10"x  10 10"x10 10"x  10 10"x10 10"x  10 10"x  10 10"x10 10"x10 np   gastroc stretching HEP NV W/ strap  5x20" Strap  20"x5 nv UBE slant  30"x3 UBE slant  30"x3 UBE slant  30"x3 UBE slant  30"x3 UBE  30"x3   SLR HEP 2x10 2x10 2x10 2x10 2x10 2x10 nv 2x10 3x10   SAQ HEP 2x10 3" hold  2x10 3" hold, 2x10 3" hold  2x10  1# 3" hold, 2x10 1 5#  3" hold, 2x10 2#  3" hold  2x10 2#  3" hold   2x10 np   LAQ  2x10 2x10 2x10 1#  2x10 1#  2x10 1 5#  2x10 2#  2x10 2#  2x10 2 5#  3"  2x10   Knee flex stretch off EOT  10"x 10 10" x10  10"x10 10"x  10  nv nv      Seated knee flexion   Self-  Assist  x15          Knee flex stretch on leg press     10"x   10 nv     10x15"   Mini squats at wall with hand support       2x10  2x10 nv 2x10 w/ cane on wall 2x10  W/ cane     Step ups       6"   2x10  6"   2x10 nv 6"   2x10  Unilateral UE support  nv   TKE        Juan  10#   Juan  10#  2x10 Juan  15#  x30   Ther Activity                                       Gait Training                                       Modalities             Ice   10 min post elevated   10 min post 10 min post 10 min post 10 min post      IE/HEP

## 2021-07-01 ENCOUNTER — OFFICE VISIT (OUTPATIENT)
Dept: PHYSICAL THERAPY | Facility: CLINIC | Age: 61
End: 2021-07-01
Payer: COMMERCIAL

## 2021-07-01 DIAGNOSIS — Z96.652 STATUS POST TOTAL LEFT KNEE REPLACEMENT: Primary | ICD-10-CM

## 2021-07-01 PROCEDURE — 97140 MANUAL THERAPY 1/> REGIONS: CPT

## 2021-07-01 PROCEDURE — 97110 THERAPEUTIC EXERCISES: CPT

## 2021-07-01 NOTE — PROGRESS NOTES
Daily Note     Today's date: 2021  Patient name: Bernadette Grullon  : 1960  MRN: [id number]  Referring provider: Wilma Hernandez MD  Dx:   Encounter Diagnosis     ICD-10-CM    1  Status post total left knee replacement  Z96 652                   Subjective: Patient did see ortho yesterday (see note in encounter section), pleased with his ROM progress  He continues to have some pain over distal quad however this is improving, along with pain distal to patella  Calf tightness also remains  Objective: See treatment diary below  Assessment: Explained to patient that once TKE improves, gait will improve, along with gastroc tightness  Quad lag with SLR and LAQ indicating unresolved weakness  Will continue to work towards maintaining gained mobility, further addressing lacking ext motion and functional strength needed to reduce deviations with amb  Plan: Continue per plan of care  Progress treatment as tolerated              Precautions: n/a    Manuals    PROM L knee EH      EH JF AG JF   Patella mob PROM - EH      PROM - EH JF AG JF   Extension mob        JF AG JF   Mod annalisa distraction mob        JF  Gr 3 AG  Gr 3 JF  Gr 3   IASTM to L distal quad and ITB                          Neuro Re-Ed             Quad set             HS set                                                                              Ther Ex             bike 7 min  timer      5 min rock 5 min rock 5 min rock 7 min    Heel slides       10"x  10 10"x10 10"x10 np   gastroc stretching UBE  30"x3      UBE slant  30"x3 UBE slant  30"x3 UBE slant  30"x3 UBE  30"x3   SLR 3x10      2x10 nv 2x10 3x10   SAQ       1 5#  3" hold, 2x10 2#  3" hold  2x10 2#  3" hold   2x10 np   LAQ 2 5#  3" hold,  2x15      1 5#  2x10 2#  2x10 2#  2x10 2 5#  3"  2x10   Knee flex stretch off EOT        nv nv      Seated knee flexion             Knee flex stretch on leg press 15"x  10         10x15"   Mini squats at wall with hand support With cane  2x10       2x10 nv 2x10 w/ cane on wall 2x10  W/ cane     Step ups NV       6"   2x10 nv 6"   2x10  Unilateral UE support  nv   TKE Carmen  15 0  x30       Juan  10#   Juan  10#  2x10 Juan  15#  x30                Ther Activity                                       Gait Training                                       Modalities             Ice  10 min post      10 min post      IE/HEP

## 2021-07-07 ENCOUNTER — OFFICE VISIT (OUTPATIENT)
Dept: PHYSICAL THERAPY | Facility: CLINIC | Age: 61
End: 2021-07-07
Payer: COMMERCIAL

## 2021-07-07 DIAGNOSIS — Z96.652 STATUS POST TOTAL LEFT KNEE REPLACEMENT: Primary | ICD-10-CM

## 2021-07-07 PROCEDURE — 97110 THERAPEUTIC EXERCISES: CPT

## 2021-07-07 PROCEDURE — 97140 MANUAL THERAPY 1/> REGIONS: CPT

## 2021-07-07 NOTE — PROGRESS NOTES
Daily Note     Today's date: 2021  Patient name: Ambrocio Royal  : 1960  MRN: [id number]  Referring provider: Ritu Mancini MD  Dx:   Encounter Diagnosis     ICD-10-CM    1  Status post total left knee replacement  [id number] [id number]                 DOS: 2021      Subjective: Patient was away camping over the weekend, noting increased anterior L knee discomfort that lasted into the evening last night  Was sitting in a lower "sling" chair and noticed increased difficulty with going from sitting to standing  Also having continued difficulty with stepping down on stairs  Woke this morning with less symptoms  Objective: See treatment diary below  Updated home program with eccentric step downs and sit to stands  Assessment: Appeared to have increased swelling / edema in distal portion of L LE when looking comparatively to R  ROM did not regress since the end of last week  Quad lag with SLR and LAQ indicating unresolved weakness  Worked on adding eccentric quad strengthening with step downs and sit to stands  Will continue to work towards maintaining gained mobility, further addressing lacking ext motion and functional strength needed to reduce deviations with amb and stairs  Plan: Continue per plan of care  Progress treatment as tolerated              Precautions: n/a    Manuals    PROM L knee [address] [facility]     EH JF AG JF   Patella mob PROM - EH PROM - EH     PROM - EH JF AG JF   Extension mob        JF AG JF   Mod annalisa distraction mob        JF  Gr 3 AG  Gr 3 JF  Gr 3   IASTM to L distal quad and ITB                          Neuro Re-Ed             Quad set             HS set                                                                              Ther Ex             bike 7 min  timer 7 min  timer     5 min rock 5 min rock 5 min rock 7 min    Heel slides       10"x  10 10"x10 10"x10 np   gastroc stretching UBE  30"x3 UBE  30"x3     UBE slant  30"x3 UBE slant  30"x3 UBE slant  30"x3 UBE  30"x3   SLR 3x10 3x10     2x10 nv 2x10 3x10   SAQ       1 5#  3" hold, 2x10 2#  3" hold  2x10 2#  3" hold   2x10 np   LAQ 2 5#  3" hold,  2x15 2 5#  3" hold, 2x15     1 5#  2x10 2#  2x10 2#  2x10 2 5#  3"  2x10   Knee flex stretch off EOT        nv nv      Seated knee flexion             Knee flex stretch on leg press 15"x  10  15"x   10        10x15"   Mini squats at wall with hand support With cane  2x10  2x10      2x10 nv 2x10 w/ cane on wall 2x10  W/ cane     Step ups NV  Wood  Stairs   6"   2x10         6"   2x10 nv 6"   2x10  Unilateral UE support  nv   TKE Carmen  15 0  x30  [place]   17 0   x30      Juan  10#   [place] [employer]  10#  2x10 Juan  15#  x30   Step downs - eccentric control   4"   2x10           Sit to stands with L bias   Foam  on    chair   x10                        Ther Activity                                       Gait Training                                       Modalities             Ice  10 min post def     10 min post      IE/HEP

## 2021-07-08 ENCOUNTER — OFFICE VISIT (OUTPATIENT)
Dept: URGENT CARE | Facility: CLINIC | Age: 61
End: 2021-07-08
Payer: COMMERCIAL

## 2021-07-08 VITALS — BODY MASS INDEX: 26.11 KG/M2 | WEIGHT: 210 LBS | HEIGHT: 75 IN

## 2021-07-08 DIAGNOSIS — R68.89 FLU-LIKE SYMPTOMS: Primary | ICD-10-CM

## 2021-07-08 PROCEDURE — 99213 OFFICE O/P EST LOW 20 MIN: CPT | Performed by: NURSE PRACTITIONER

## 2021-07-08 PROCEDURE — 87631 RESP VIRUS 3-5 TARGETS: CPT | Performed by: NURSE PRACTITIONER

## 2021-07-08 PROCEDURE — U0005 INFEC AGEN DETEC AMPLI PROBE: HCPCS | Performed by: NURSE PRACTITIONER

## 2021-07-08 PROCEDURE — U0003 INFECTIOUS AGENT DETECTION BY NUCLEIC ACID (DNA OR RNA); SEVERE ACUTE RESPIRATORY SYNDROME CORONAVIRUS 2 (SARS-COV-2) (CORONAVIRUS DISEASE [COVID-19]), AMPLIFIED PROBE TECHNIQUE, MAKING USE OF HIGH THROUGHPUT TECHNOLOGIES AS DESCRIBED BY CMS-2020-01-R: HCPCS | Performed by: NURSE PRACTITIONER

## 2021-07-08 NOTE — PROGRESS NOTES
[address] [facility]        NAME: Mary Mckinley is a 56 y o  male  : 1960    MRN: [id number]  DATE: 2021  TIME: 11:36 AM    Assessment and Plan   Flu-like symptoms [R68 89]  1  Flu-like symptoms  Influenza A/B and RSV by PCR    Novel Coronavirus (Covid-19),PCR [facility] - Office Collection     COVID, flu, rsv swab collected   [facility] tx measures reviewed   Self isolate until results back   Pt in agreement with plan of care    Patient Instructions     Follow up with PCP in 3-5 days  Proceed to  ER if symptoms worsen  Chief Complaint     Chief Complaint   Patient presents with    Sinusitis     PT states rhinorrhea, sore throat, cough and ear pressure x 2 days  Taking tylenol and allergy medication , robitussin DM  History of Present Illness   Mary Mckinley presents to the clinic c/o    Pt states was up in [place] with a bunch of friends for a week -   No one was ill during their trip, however, on tues when they were driving home, a friend became ill - fever, cough, congestion, body aches, fatigue and slept the whole ride home in the back seat of his  Vehicle  On Tues night he started with cough and clear nasal drainage, some pressure in ears and a minor irritated sore throat  No one has had covid testing who has been ill yet  He is not vaccinated against covid   Concerned due to recent knee replacement surgery - he does not want that to get infected  Review of Systems   Review of Systems   All other systems reviewed and are negative          Current Medications     Long-Term Medications   Medication Sig Dispense Refill    ibuprofen (MOTRIN) 200 mg tablet Take by mouth every 6 (six) hours as needed for mild pain      zolpidem (AMBIEN CR) 12 5 MG CR tablet Take 1 tablet (12 5 mg total) by mouth daily at bedtime as needed for sleep 30 tablet 5       Current Allergies     Allergies as of 2021    (No Known Allergies)            The following portions of the patient's history were reviewed and updated as appropriate: allergies, current medications, past family history, past medical history, past social history, past surgical history and problem list     Objective   Ht 6' 3" (1 905 m)   Wt 95 3 kg (210 lb)   BMI 26 25 kg/m²        Physical Exam     Physical Exam  Vitals and nursing note reviewed  Constitutional:       Appearance: Normal appearance  He is well-developed  HENT:      Head: Normocephalic and atraumatic  Right Ear: Hearing, tympanic membrane, ear canal and external ear normal       Left Ear: Hearing, tympanic membrane, ear canal and external ear normal       Nose: Mucosal edema and congestion present  Mouth/Throat:      Lips: Pink  Mouth: Mucous membranes are moist       Pharynx: Oropharynx is clear  Eyes:      General: Lids are normal       Conjunctiva/sclera: Conjunctivae normal       Pupils: Pupils are equal, round, and reactive to light  Cardiovascular:      Rate and Rhythm: Normal rate and regular rhythm  Heart sounds: Normal heart sounds, S1 normal and S2 normal    Pulmonary:      Effort: Pulmonary effort is normal       Breath sounds: Normal breath sounds  Skin:     General: Skin is warm and dry  Neurological:      Mental Status: He is alert  Psychiatric:         Speech: Speech normal          Behavior: Behavior normal          Thought Content:  Thought content normal          Judgment: Judgment normal

## 2021-07-08 NOTE — PATIENT INSTRUCTIONS
Cold Symptoms   WHAT YOU NEED TO KNOW:   A cold is an infection caused by a virus  The infection causes your upper respiratory system to become inflamed  Common symptoms of a cold include sneezing, dry throat, a stuffy nose, headache, watery eyes, and a cough  Your cough may be dry, or you may cough up mucus  You may also have muscle aches, joint pain, and tiredness  Rarely, you may have a fever  Most colds go away without treatment  DISCHARGE INSTRUCTIONS:   Return to the emergency department if:   · You have increased tiredness and weakness  · You are unable to eat  · Your heart is beating much faster than usual for you  · You see white spots in the back of your throat and your neck is swollen and sore to the touch  · You see pinpoint or larger reddish-purple dots on your skin  Contact your healthcare provider if:   · You have a fever higher than 102°F (38 9°C)  · You have new or worsening shortness of breath  · You have thick nasal drainage for more than 2 days  · Your symptoms do not improve or get worse within 5 days  · You have questions or concerns about your condition or care  Medicines: The following medicines may be suggested by your healthcare provider to decrease your cold symptoms  These medicines are available without a doctor's order  Ask which medicines to take and when to take them  Follow directions  · NSAIDs or acetaminophen  help to bring down a fever or decrease pain  · Decongestants  help decrease nasal stuffiness  · Antihistamines  help decrease sneezing and a runny nose  · Cough suppressants  help decrease how much you cough  · Expectorants  help loosen mucus so you can cough it up  · Take your medicine as directed  Contact your healthcare provider if you think your medicine is not helping or if you have side effects  Tell him of her if you are allergic to any medicine  Keep a list of the medicines, vitamins, and herbs you take   Include the amounts, and when and why you take them  Bring the list or the pill bottles to follow-up visits  Carry your medicine list with you in case of an emergency  Symptom relief: The following may help relieve cold symptoms, such as a dry throat and congestion:  · Gargle with mouthwash or warm salt water as directed  · Suck on throat lozenges or hard candy  · Use a cold or warm vaporizer or humidifier to ease your breathing  · Rest for at least 2 days and then as needed to decrease tiredness and weakness  · Use petroleum based jelly around your nostrils to decrease irritation from blowing your nose  Drink liquids:  Liquids will help thin and loosen thick mucus so you can cough it up  Liquids will also keep you hydrated  Ask your healthcare provider which liquids are best for you and how much to drink each day  Prevent the spread of germs: You can spread your cold germs to others for at least 3 days after your symptoms start  Wash your hands often  Do not share items, such as eating utensils  Cover your nose and mouth when you cough or sneeze using the crook of your elbow instead of your hands  Throw used tissues in the garbage  Do not smoke:  Smoking may worsen your symptoms and increase the length of time you feel sick  Talk with your healthcare provider if you need help to stop smoking  Follow up with your healthcare provider as directed:  Write down your questions so you remember to ask them during your visits  © Copyright [address] Information is for End User's use only and may not be sold, redistributed or otherwise used for commercial purposes  All illustrations and images included in CareNotes® are the copyrighted property of [employer]  or [facility] Franki Mott   The above information is an  only  It is not intended as medical advice for individual conditions or treatments   Talk to your doctor, nurse or pharmacist before following any medical regimen to see if it is safe and effective for you

## 2021-07-09 ENCOUNTER — APPOINTMENT (OUTPATIENT)
Dept: PHYSICAL THERAPY | Facility: CLINIC | Age: 61
End: 2021-07-09
Payer: COMMERCIAL

## 2021-07-09 LAB
FLUAV RNA NPH QL NAA+PROBE: NORMAL
FLUBV RNA NPH QL NAA+PROBE: NORMAL
RSV RNA NPH QL NAA+PROBE: NORMAL
SARS-COV-2 RNA RESP QL NAA+PROBE: NEGATIVE

## 2021-07-12 ENCOUNTER — OFFICE VISIT (OUTPATIENT)
Dept: PHYSICAL THERAPY | Facility: CLINIC | Age: 61
End: 2021-07-12
Payer: COMMERCIAL

## 2021-07-12 DIAGNOSIS — Z96.652 STATUS POST TOTAL LEFT KNEE REPLACEMENT: Primary | ICD-10-CM

## 2021-07-12 PROCEDURE — 97110 THERAPEUTIC EXERCISES: CPT | Performed by: PHYSICAL THERAPIST

## 2021-07-12 PROCEDURE — 97140 MANUAL THERAPY 1/> REGIONS: CPT | Performed by: PHYSICAL THERAPIST

## 2021-07-12 NOTE — PROGRESS NOTES
Daily Note     Today's date: 2021  Patient name: Daily Alba  : 1960  MRN: [id number]  Referring provider: Anthony Rodriguez MD  Dx:   Encounter Diagnosis     ICD-10-CM    1  Status post total left knee replacement  Z96 652                   Subjective: Patient reports that he believes that his ROM is improving  Has been able to descend stairs better  Objective: See treatment diary below  Progressed treatment as indicated below  Knee flexion ROM: 122 deg      Assessment: Patient was able to tolerate progression of resistance exercises without an increase in pain  They demonstrated muscular fatigue as expected with progression  Patient demonstrates improved functional strength, however continues to demonstrate quad lag with SLR  ROM improving both passively and actively  Continue to progress treatment as tolerated  Patient will continue to benefit from skilled PT in order to address impairments and improve function  Plan: Continue per plan of care  Progress treatment as tolerated         Precautions: n/a    Manuals    PROM L knee [address] [facility] EH JF    EH JF AG JF   Patella mob PROM - EH PROM - EH JF    PROM - EH JF AG JF   Extension mob   JF     JF AG JF   Mod annalisa distraction mob   JF  Gr4     JF  Gr 3 AG  Gr 3 JF  Gr 3   IASTM to L distal quad and ITB                          Neuro Re-Ed             Quad set             HS set             S/L balance   3x30"          Rocker board   3x30"                                                 Ther Ex             bike 7 min  timer 7 min  timer 7 min    5 min rock 5 min rock 5 min rock 7 min    Heel slides       10"x  10 10"x10 10"x10 np   gastroc stretching UBE  30"x3 UBE  30"x3 UBE slant  30"x3    UBE slant  30"x3 UBE slant  30"x3 UBE slant  30"x3 UBE  30"x3   SLR 3x10 3x10 3x10  1#    2x10 nv 2x10 3x10   SAQ       1 5#  3" hold, 2x10 2#  3" hold  2x10 2#  3" hold   2x10 np   LAQ 2 5#  3" hold,  2x15 2 5#  3" hold, 2x15 3#  3" hold  3x15    1 5#  2x10 2#  2x10 2#  2x10 2 5#  3"  2x10   Knee flex stretch off EOT        nv nv      Seated knee flexion             Knee flex stretch on leg press 15"x  10  15"x   10 15"x10       10x15"   Mini squats at wall with hand support With cane  2x10  2x10 3x10     2x10 nv 2x10 w/ cane on wall 2x10  W/ cane     Step ups NV  Wood  Stairs   6"   2x10    8" step    2x10     6"   2x10 nv 6"   2x10  Unilateral UE support  nv   TKE Carmen  15 0  x30  [place]   17 0   x30 [place]  18 0  x30     Juan  10#   [place]   10#  2x10 Juan  15#  x30   Step downs - eccentric control   4"   2x10 4"  2x10          Sit to stands with L bias   Foam  on    chair   x10                        Ther Activity                                       Gait Training                                       Modalities             Ice  10 min post def     10 min post      IE/HEP

## 2021-07-16 ENCOUNTER — OFFICE VISIT (OUTPATIENT)
Dept: PHYSICAL THERAPY | Facility: CLINIC | Age: 61
End: 2021-07-16
Payer: COMMERCIAL

## 2021-07-16 DIAGNOSIS — Z96.652 STATUS POST TOTAL LEFT KNEE REPLACEMENT: Primary | ICD-10-CM

## 2021-07-16 PROCEDURE — 97110 THERAPEUTIC EXERCISES: CPT | Performed by: PHYSICAL THERAPIST

## 2021-07-16 PROCEDURE — 97140 MANUAL THERAPY 1/> REGIONS: CPT | Performed by: PHYSICAL THERAPIST

## 2021-07-16 NOTE — PROGRESS NOTES
Daily Note     Today's date: 2021  Patient name: Darryn Overton  : 1960  MRN: [id number]  Referring provider: Peter Kaplan MD  Dx:   Encounter Diagnosis     ICD-10-CM    1  Status post total left knee replacement  Z96 652                   Subjective: Patient reports that he feels good today  "Better sleep last night than I've had in weeks "      Objective: See treatment diary below  Progressed treatment as indicated below  PROM knee flexion: 127 degrees      Assessment: Patient was able to tolerate progression of resistance exercises without an increase in pain  They demonstrated muscular fatigue as expected with progression  Quad lag during SLR improving, though he continues to demonstrate a mild quad lag as the muscle faitgues around rep 20-25  AROM improving, especially in flexion  Continue to progress as tolerated  Patient will continue to benefit from skilled PT in order to address impairments and improve function  Plan: Continue per plan of care  Progress treatment as tolerated         Precautions: n/a    Manuals      PROM L knee EH EH JF JF     Patella mob PROM - EH PROM - EH JF JF     Extension mob   JF JF     Mod annalisa distraction mob   JF  Gr4 JF  Gr 4     IASTM to L distal quad and ITB                  Neuro Re-Ed         Quad set         HS set         S/L balance   3x30" 3x30"     Rocker board   3x30" 3x30"                                Ther Ex         bike 7 min  timer 7 min  timer 7 min 7 min     Heel slides         gastroc stretching UBE  30"x3 UBE  30"x3 UBE slant  30"x3 UBE slant  30"x3     SLR 3x10 3x10 3x10  1# 3x10       SAQ         LAQ 2 5#  3" hold,  2x15 2 5#  3" hold, 2x15 3#  3" hold  3x15 4#  3" hold  3x15     Knee flex stretch off EOT         Seated knee flexion         Knee flex stretch on leg press 15"x  10  15"x   10 15"x10 15"  2x10     Mini squats at wall with hand support With cane  2x10  2x10 3x10 STS with YMB  2x10     Step ups NV  Marlou Shove Stairs   6"   2x10    8" step    2x10 nv     TKE [place]  15 0  x30  [place]   17 0   x30 [place]  18 0  x30 Carmen  18  0#  x30     Step downs - eccentric control   4"   2x10 4"  2x10 nv     Sit to stands with L bias   Foam  on    chair   x10                Ther Activity                           Gait Training                           Modalities         Ice  10 min post def       IE/HEP

## 2021-07-19 ENCOUNTER — OFFICE VISIT (OUTPATIENT)
Dept: PHYSICAL THERAPY | Facility: CLINIC | Age: 61
End: 2021-07-19
Payer: COMMERCIAL

## 2021-07-19 DIAGNOSIS — Z96.652 STATUS POST TOTAL LEFT KNEE REPLACEMENT: Primary | ICD-10-CM

## 2021-07-19 PROCEDURE — 97140 MANUAL THERAPY 1/> REGIONS: CPT

## 2021-07-19 PROCEDURE — 97110 THERAPEUTIC EXERCISES: CPT

## 2021-07-19 NOTE — PROGRESS NOTES
Daily Note     Today's date: 2021  Patient name: Candida Haro  : 1960  MRN: [id number]  Referring provider: Kacy Monsivais MD  Dx:   Encounter Diagnosis     ICD-10-CM    1  Status post total left knee replacement  Z96 652                   Subjective: Patient feels he may have done too much on Friday  More stiff into the weekend and this morning  He continues to have difficulty with sit to stand transfers  Objective: See treatment diary below  Assessment: A few degrees of lacking TKE compared to R  Quad lag improving, still present towards end of third set due to unresolved weakness and fatigue  He uses more momentums vs muscle control with sit to stands  Will continue to work on functional strengthening to help patient improve with normal function and tasks  Plan: Continue per plan of care  Progress treatment as tolerated              Precautions: n/a    Manuals     PROM L knee 1404 [facility] EH JF JF EH    Patella mob PROM - EH PROM - EH JF JF PROM - EH    Extension mob   JF JF     Mod annalisa distraction mob   JF  Gr4 JF  Gr 4     IASTM to L distal quad and ITB                  Neuro Re-Ed         Quad set         HS set         S/L balance   3x30" 3x30"     Rocker board   3x30" 3x30" 30"x2  ea                               Ther Ex         bike 7 min  timer 7 min  timer 7 min 7 min 7 min    Heel slides         gastroc stretching UBE  30"x3 UBE  30"x3 UBE slant  30"x3 UBE slant  30"x3 UBE  Slant  30"x3    SLR 3x10 3x10 3x10  1# 3x10   3x10    SAQ         LAQ 2 5#  3" hold,  2x15 2 5#  3" hold, 2x15 3#  3" hold  3x15 4#  3" hold  3x15 4#  3" hold, 3x15    Knee flex stretch off EOT         Seated knee flexion         Knee flex stretch on leg press 15"x  10  15"x   10 15"x10 15"  2x10  15"x   10    Mini squats at wall with hand support With cane  2x10  2x10 3x10 STS with YMB  2x10  STS   with    YMB   2x5    Step ups NV  Wood  Stairs   6"   2x10    8" step    2x10 nv  8" step   up and    over   2x10    TKE [place]  15 0  x30  [place]   17 0   x30 [place]  18 0  x30 [place]  18  0#  x30  [place]   18 0   x30    Step downs - eccentric control   4"   2x10 4"  2x10 nv  With    up and    over   on 8"    Sit to stands with L bias   Foam  on    chair   x10                Ther Activity                           Gait Training                           Modalities         Ice  10 min post def   10 min post    IE/HEP

## 2021-07-23 ENCOUNTER — OFFICE VISIT (OUTPATIENT)
Dept: PHYSICAL THERAPY | Facility: CLINIC | Age: 61
End: 2021-07-23
Payer: COMMERCIAL

## 2021-07-23 DIAGNOSIS — Z96.652 STATUS POST TOTAL LEFT KNEE REPLACEMENT: Primary | ICD-10-CM

## 2021-07-23 PROCEDURE — 97110 THERAPEUTIC EXERCISES: CPT

## 2021-07-23 PROCEDURE — 97112 NEUROMUSCULAR REEDUCATION: CPT

## 2021-07-23 PROCEDURE — 97140 MANUAL THERAPY 1/> REGIONS: CPT

## 2021-07-23 NOTE — PROGRESS NOTES
Daily Note     Today's date: 2021  Patient name: Melvin Cronin  : 1960  MRN: [id number]  Referring provider: Eun Greenbreg MD  Dx:   Encounter Diagnosis     ICD-10-CM    1  Status post total left knee replacement  T19 [id number]                 DOS: 21      Subjective: Patient feels daily activities are getting easier to complete, such as putting on pants and shoes  Symptoms are less upon arrival compared to previous visit  He notes most pain at night when sitting and occasional increased swelling by the end of the day  Objective: See treatment diary below  R knee flex PROM with overpressure measured at 130°  Assessment: A few degrees of lacking TKE compared to R  Quad lag improving, still present towards end of third set or SLR and LAQ due to unresolved weakness and fatigue  Improved control with sit to stand transfers, cued to reduce valgus  Will continue to work on functional strengthening to help patient improve with normal function and tasks  Plan: Continue per plan of care  Progress treatment as tolerated              Precautions: n/a    Manuals    PROM L knee 1404 [facility] JF JF 1404 [facility]   Patella mob PROM - EH PROM - EH JF JF PROM - EH PROM - EH   Extension mob   JF JF     Mod annalisa distraction mob   JF  Gr4 JF  Gr 4     IASTM to L distal quad and ITB                  Neuro Re-Ed         Quad set         HS set         S/L balance   3x30" 3x30"     Rocker board   3x30" 3x30" 30"x2  ea 1 min  ea                              Ther Ex         bike 7 min  timer 7 min  timer 7 min 7 min 7 min 5 min L1   Heel slides         gastroc stretching UBE  30"x3 UBE  30"x3 UBE slant  30"x3 UBE slant  30"x3 UBE  Slant  30"x3 UBE  Slant  30"x3   SLR 3x10 3x10 3x10  1# 3x10   3x10 3x10   SAQ         LAQ 2 5#  3" hold,  2x15 2 5#  3" hold, 2x15 3#  3" hold  3x15 4#  3" hold  3x15 4#  3" hold, 3x15 4#  3" hold, 3x15   Knee flex stretch off EOT         Seated knee flexion Knee flex stretch on leg press 15"x  10  15"x   10 15"x10 15"  2x10  15"x   10  15"x   10   Mini squats at wall with hand support With cane  2x10  2x10 3x10 STS with YMB  2x10  STS   with    YMB   2x5  STS    with   YMB   2x5   Step ups NV  Wood  Stairs   6"   2x10    8" step    2x10 nv  8" step   up and    over   2x10  8"    up and   over   2x10 -    ecc control   TKE [place]  15 0  x30  [place]   17 0   x30 [place]  18 0  x30 [place]  18  0#  x30  [place]   18 0   x30  [place]   18 0   x30   Step downs - eccentric control   4"   2x10 4"  2x10 nv  With    up and    over   on 8"  See   step    ups   Sit to stands with L bias   Foam  on    chair   x10                Ther Activity                           Gait Training                           Modalities         Ice  10 min post def   10 min post 10 min post   IE/HEP

## 2021-07-26 ENCOUNTER — EVALUATION (OUTPATIENT)
Dept: PHYSICAL THERAPY | Facility: CLINIC | Age: 61
End: 2021-07-26
Payer: COMMERCIAL

## 2021-07-26 DIAGNOSIS — Z96.652 STATUS POST TOTAL LEFT KNEE REPLACEMENT: Primary | ICD-10-CM

## 2021-07-26 PROCEDURE — 97110 THERAPEUTIC EXERCISES: CPT | Performed by: PHYSICAL THERAPIST

## 2021-07-26 PROCEDURE — 97140 MANUAL THERAPY 1/> REGIONS: CPT | Performed by: PHYSICAL THERAPIST

## 2021-07-26 NOTE — LETTER
2021    MD Kurt Blanchard [phone]    Patient: Ambrocio Royal   YOB: 1960   Date of Visit: 2021     Encounter Diagnosis     ICD-10-CM    1  Status post total left knee replacement  [id number]        Dear Dr Adair Pate: Thank you for your recent referral of Ambrocio Royal  Please review the attached evaluation summary from Leonardo's recent visit  Please verify that you agree with the plan of care by signing the attached order  If you have any questions or concerns, please do not hesitate to call  I sincerely appreciate the opportunity to share in the care of one of your patients and hope to have another opportunity to work with you in the near future  Sincerely,    Karin Titus, PT      Referring Provider:      I certify that I have read the below Plan of Care and certify the need for these services furnished under this plan of treatment while under my care  MD Kurt Blanchard [phone]  Via Fax: [phone]          PT Evaluation     Today's date: 2021  Patient name: Ambrocio Royal  : 1960  MRN: [id number]  Referring provider: Ritu Mancini MD  Dx:   Encounter Diagnosis     ICD-10-CM    1  Status post total left knee replacement  Z96 652                   Assessment/Plan  Patient is a 56 y o  male who presented to OP PT s/p TKA by Dr Adair Pate on 21  Patient is now 12 weeks post-op  He has attended 17 visits over the last 9 weeks  Since starting therapy pt has made the following progress towards goals:  1) pain: Patient states that his max pain has reduced to 4/10, and much less frequently  2) range of motion: AROM has improved to -4 to 120 degrees, PROM has improved to 0-125 degrees  3) self rated functional score: FOTO has improved to 57 from 30     Patient's ROM and strength has improved over the last 9 weeks   Patient continues to have deficits with eccentric knee control and balance  His POC will progress accordingly to address this deficits  Patient to continue therapy 1x/week for 4 weeks to work on transitioning to HEP  See updated goals below  Goals  Short Term Goals:  1) Pain : Decrease R knee  pain to 3/10 at worst x 1 continuous week within 4 weeks  - progressing  2) AROM: Improve R knee AROM to at least 120 degrees flexion,  to have 0 degrees extension within 4 weeks  - partially met  3) Strength:R  LE strength to be at least 4+/5 for all hip and knee within 4 weeks- MET  4) Function: Improved FOTO score from IE within 4 weeks (30 at IE), patient to note greater ease of ambulation subjectively  Transition to not using AD for community ambulation within 3-4 weeks  -  MET    LongTerm Goals:  1) Pain : Eliminate R knee pain  x 1 continuous week within 8 weeks  - progressing  2) Swelling: Eliminate R knee swelling within 8-10 weeks- MET  3) AROM: Improve R knee AROM to  0-130 degrees within 8 weeks  - progressing, PROM at 125 deg  4) Strength: Improved R LE strength to 5/5 within 8 weeks  - MET  5) Function: Improved FOTO score to at least 61; Normal gait without AD, no reported difficulty with ADLs as they pertain to L knee within 8-10 weeks  - progressing  6) (I) with HEP within 8 weeks  Plan  Plan details: Patient would benefit from skilled PT 1x/week for the next 4 weeks to transition to HEP     Patient would benefit from: skilled physical therapy  Referral necessary: no  Planned modality interventions: Hydrocollator packs, Cryotherapy and TENS  Planned therapy interventions: joint mobilization, manual therapy, massage, neuromuscular re-education, patient education, stretching, strengthening, therapeutic activities, therapeutic exercise, home exercise program, functional ROM exercises, gait training, flexibility, balance, abdominal trunk stabilization, motor coordination training, coordination and behavior modification  Frequency: 1x/week for 4 weeks  Duration in visits: 4  Plan of Care beginning date: 7/26/2021  Plan of Care expiration date: 08/23/2021  Treatment plan discussed with: patient    Subjective  UPDATE: Patient reports that his knee feels better everyday  Was sore over the weekend from walking around on uneven ground  States that his knee feels 80% better  Relevant details:   Patient is a 56 y o  male who presents for an initial outpatient physical therapy consultation s/p L TKA on 5/5/21 with Dr Alexandra Tamayo  Current c/o L knee pain and stretching with bending his knee or getting up out of chair  Patient reports that surgery went well  Patient has had home physical therapy for 2 5 weeks post-op  He thinks that he is starting to be able to get around easier  He states that he is still having difficulty with getting up and down out of a chair or getting in/ out of a car  Patient prefers sleeping on his left side and has had trouble getting comfortable at night  Patient has a full set of stairs at home, and has been ascending/descending non-reciprocally, "up with the good, down with the bad"  Patient currently taken celebrex and tylenol  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 No    Pain  Best: 0 (was 2)  Worst: 4 (was 6)- much less frequently  Location: lateral quad   Quality:  Sore, "tightness"   Aggravating factors:  Working out, stretching, trying to get comfortable in bed  Alleviating factors: Rest, stop painful activities, ice   Progression: Improved    Objective     Observations   Left Knee   Positive for mild edema- improved    Additional Observation Details  Incision appears well healing without s/s infection    Active Range of Motion   Left Knee   Flexion: 120 degrees (was 75 degrees)  Extension: -4 (was -10 degrees)    Right Knee   Normal active range of motion    Passive Range of Motion Left Knee   Flexion: 125 degrees (was 80 degrees)  Extension: 0 degrees (was -6 degrees)    Right Knee   Normal passive range of motion    Strength/Myotome Testing     Left Hip   Planes of Motion   Flexion: 5 (was 4)  Abduction: 5 (was 4-)    Right Hip   Planes of Motion   Flexion: 5  Abduction: 5 (was 4+)    Left Knee   Flexion: 5 (was 3+)  Extension: 5 (was 3+)  Quadriceps contraction: poor    Right Knee   Normal strength  Flexion: 5  Extension: 5  Quadriceps contraction: good    Left Ankle/Foot   Dorsiflexion: 5 (was 4+)  Right Ankle/Foot   Dorsiflexion: 5    Ambulation     Observational Gait   Gait:Patient ambulates at PLOF without AD       Knee Comments  TUG: 10 9s w/o AD (was 15 5s w/SPC and UE support upon standing/sitting)    Precautions: n/a    Manuals 7/1 7/7 7/12 7/16 7/19 7/23 7/26   PROM L knee 1404 [facility] EH JF JF EH EH JF   Patella mob PROM - EH PROM - EH JF JF PROM - EH PROM - EH JF   Extension mob     JF JF        Mod annalisa distraction mob     JF  Gr4 JF  Gr 4        IASTM to L distal quad and ITB                                 Neuro Re-Ed                Quad set                HS set                S/L balance     3x30" 3x30"        Rocker board     3x30" 3x30" 30"x2  ea 1 min  ea nv                                                      Ther Ex                bike 7 min  timer 7 min  timer 7 min 7 min 7 min 5 min L1 10 min   Heel slides                gastroc stretching UBE  30"x3 UBE  30"x3 UBE slant  30"x3 UBE slant  30"x3 UBE  Slant  30"x3 UBE  Slant  30"x3 UBE  Slant  30"x3   SLR 3x10 3x10 3x10  1# 3x10    3x10 3x10 3x10   SAQ                LAQ 2 5#  3" hold,  2x15 2 5#  3" hold, 2x15 3#  3" hold  3x15 4#  3" hold  3x15 4#  3" hold, 3x15 4#  3" hold, 3x15 nv   Knee flex stretch off EOT                Seated knee flexion                Knee flex stretch on leg press 15"x  10  15"x   10 15"x10 15"  2x10  15"x   10  15"x   10 15"x10   Mini squats at wall with hand support With cane  2x10  2x10 3x10 STS with YMB  2x10  STS   with    YMB   2x5  STS    with   YMB   2x5 STS with YMB 2x5   Step ups NV  Wood  Stairs   6"   2x10    8" step     2x10 nv  8" step   up and    over   2x10  8"    up and   over   2x10 -    ecc control nv   TKE [place]  15 0  x30  [place]   17 0   x30 [place]  18 0  x30 [place]  18  0#  x30  [place]   18 0   x30  [place]   18 0   x30 nv   Step downs - eccentric control    4"   2x10 4"  2x10 nv  With    up and    over   on 8"  See   step    ups    Sit to stands with L bias    Foam  on    chair   x10                             Ther Activity                                                  Gait Training                                                  Modalities                Ice  10 min post def     10 min post 10 min post np   IE/HEP             ReEval

## 2021-07-26 NOTE — PROGRESS NOTES
PT Evaluation     Today's date: 2021  Patient name: Jean Marie Taylor  : 1960  MRN: [id number]  Referring provider: Shalini Hall MD  Dx:   Encounter Diagnosis     ICD-10-CM    1  Status post total left knee replacement  Z96 [id number]                   Assessment/Plan  Patient is a 56 y o  male who presented to OP PT s/p TKA by Dr Cinda Miller on 21  Patient is now 12 weeks post-op  He has attended 17 visits over the last 9 weeks  Since starting therapy pt has made the following progress towards goals:  1) pain: Patient states that his max pain has reduced to 4/10, and much less frequently  2) range of motion: AROM has improved to -4 to 120 degrees, PROM has improved to 0-125 degrees  3) self rated functional score: FOTO has improved to 57 from 30     Patient's ROM and strength has improved over the last 9 weeks  Patient continues to have deficits with eccentric knee control and balance  His POC will progress accordingly to address this deficits  Patient to continue therapy 1x/week for 4 weeks to work on transitioning to HEP  See updated goals below  Goals  Short Term Goals:  1) Pain : Decrease R knee  pain to 3/10 at worst x 1 continuous week within 4 weeks  - progressing  2) AROM: Improve R knee AROM to at least 120 degrees flexion,  to have 0 degrees extension within 4 weeks  - partially met  3) Strength:R  LE strength to be at least 4+/5 for all hip and knee within 4 weeks- MET  4) Function: Improved FOTO score from IE within 4 weeks (30 at IE), patient to note greater ease of ambulation subjectively  Transition to not using AD for community ambulation within 3-4 weeks  -  MET    LongTerm Goals:  1) Pain : Eliminate R knee pain  x 1 continuous week within 8 weeks  - progressing  2) Swelling: Eliminate R knee swelling within 8-10 weeks- MET  3) AROM: Improve R knee AROM to  0-130 degrees within 8 weeks  - progressing, PROM at 125 deg  4) Strength: Improved R LE strength to 5/5 within 8 weeks  - MET  5) Function: Improved FOTO score to at least 61; Normal gait without AD, no reported difficulty with ADLs as they pertain to L knee within 8-10 weeks  - progressing  6) (I) with HEP within 8 weeks  Plan  Plan details: Patient would benefit from skilled PT 1x/week for the next 4 weeks to transition to HEP  Patient would benefit from: skilled physical therapy  Referral necessary: no  Planned modality interventions: Hydrocollator packs, Cryotherapy and TENS  Planned therapy interventions: joint mobilization, manual therapy, massage, neuromuscular re-education, patient education, stretching, strengthening, therapeutic activities, therapeutic exercise, home exercise program, functional ROM exercises, gait training, flexibility, balance, abdominal trunk stabilization, motor coordination training, coordination and behavior modification  Frequency: 1x/week for 4 weeks  Duration in visits: 4  Plan of Care beginning date: 7/26/2021  Plan of Care expiration date: 08/23/2021  Treatment plan discussed with: patient    Subjective  UPDATE: Patient reports that his knee feels better everyday  Was sore over the weekend from walking around on uneven ground  States that his knee feels 80% better  Relevant details:   Patient is a 56 y o  male who presents for an initial outpatient physical therapy consultation s/p L TKA on 5/5/21 with Dr Adair Pate  Current c/o L knee pain and stretching with bending his knee or getting up out of chair  Patient reports that surgery went well  Patient has had home physical therapy for 2 5 weeks post-op  He thinks that he is starting to be able to get around easier  He states that he is still having difficulty with getting up and down out of a chair or getting in/ out of a car  Patient prefers sleeping on his left side and has had trouble getting comfortable at night  Patient has a full set of stairs at home, and has been ascending/descending non-reciprocally, "up with the good, down with the bad"  Patient currently taken celebrex and tylenol  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 No    Pain  Best: 0 (was 2)  Worst: 4 (was 6)- much less frequently  Location: lateral quad   Quality:  Sore, "tightness"   Aggravating factors: Working out, stretching, trying to get comfortable in bed  Alleviating factors: Rest, stop painful activities, ice   Progression: Improved    Objective     Observations   Left Knee   Positive for mild edema- improved    Additional Observation Details  Incision appears well healing without s/s infection    Active Range of Motion   Left Knee   Flexion: 120 degrees (was 75 degrees)  Extension: -4 (was -10 degrees)    Right Knee   Normal active range of motion    Passive Range of Motion   Left Knee   Flexion: 125 degrees (was 80 degrees)  Extension: 0 degrees (was -6 degrees)    Right Knee   Normal passive range of motion    Strength/Myotome Testing     Left Hip   Planes of Motion   Flexion: 5 (was 4)  Abduction: 5 (was 4-)    Right Hip   Planes of Motion   Flexion: 5  Abduction: 5 (was 4+)    Left Knee   Flexion: 5 (was 3+)  Extension: 5 (was 3+)  Quadriceps contraction: poor    Right Knee   Normal strength  Flexion: 5  Extension: 5  Quadriceps contraction: good    Left Ankle/Foot   Dorsiflexion: 5 (was 4+)  Right Ankle/Foot   Dorsiflexion: 5    Ambulation     Observational Gait   Gait:Patient ambulates at PLOF without AD       Knee Comments  TUG: 10 9s w/o AD (was 15 5s w/SPC and UE support upon standing/sitting)    Precautions: n/a    Manuals 7/1 7/7 7/12 7/16 7/19 7/23 7/26   PROM L knee 1404 [facility] EH JF JF 1404 [facility] EH JF   Patella mob PROM - EH PROM - EH JF JF PROM - EH PROM - EH JF   Extension mob     JF JF        Mod annalisa distraction mob     JF  Gr4 JF  Gr 4        IASTM to L distal quad and ITB                                 Neuro Re-Ed                Quad set                HS set                S/L balance     3x30" 3x30"        Rocker board     3x30" 3x30" 30"x2  ea 1 min  ea nv                                                      Ther Ex                bike 7 min  timer 7 min  timer 7 min 7 min 7 min 5 min L1 10 min   Heel slides                gastroc stretching UBE  30"x3 UBE  30"x3 UBE slant  30"x3 UBE slant  30"x3 UBE  Slant  30"x3 UBE  Slant  30"x3 UBE  Slant  30"x3   SLR 3x10 3x10 3x10  1# 3x10    3x10 3x10 3x10   SAQ                LAQ 2 5#  3" hold,  2x15 2 5#  3" hold, 2x15 3#  3" hold  3x15 4#  3" hold  3x15 4#  3" hold, 3x15 4#  3" hold, 3x15 nv   Knee flex stretch off EOT                Seated knee flexion                Knee flex stretch on leg press 15"x  10  15"x   10 15"x10 15"  2x10  15"x   10  15"x   10 15"x10   Mini squats at wall with hand support With cane  2x10  2x10 3x10 STS with YMB  2x10  STS   with    YMB   2x5  STS    with   YMB   2x5 STS with YMB 2x5   Step ups NV  Wood  Stairs   6"   2x10    8" step     2x10 nv  8" step   up and    over   2x10  8"    up and   over   2x10 -    ecc control nv   TKE [place]  15 0  x30  [place]   17 0   x30 [place]  18 0  x30 [place]  18  0#  x30  [place]   18 0   x30  [place]   18 0   x30 nv   Step downs - eccentric control    4"   2x10 4"  2x10 nv  With    up and    over   on 8"  See   step    ups    Sit to stands with L bias    Foam  on    chair   x10                             Ther Activity                                                  Gait Training                                                  Modalities                Ice  10 min post def     10 min post 10 min post np   IE/HEP             ReEval

## 2021-07-29 ENCOUNTER — OFFICE VISIT (OUTPATIENT)
Dept: PHYSICAL THERAPY | Facility: CLINIC | Age: 61
End: 2021-07-29
Payer: COMMERCIAL

## 2021-07-29 DIAGNOSIS — Z96.652 STATUS POST TOTAL LEFT KNEE REPLACEMENT: Primary | ICD-10-CM

## 2021-07-29 PROCEDURE — 97140 MANUAL THERAPY 1/> REGIONS: CPT

## 2021-07-29 PROCEDURE — 97112 NEUROMUSCULAR REEDUCATION: CPT

## 2021-07-29 PROCEDURE — 97110 THERAPEUTIC EXERCISES: CPT

## 2021-07-29 NOTE — PROGRESS NOTES
Daily Note     Today's date: 2021  Patient name: Sally Ross  : 1960  MRN: [id number]  Referring provider: Jesu Lopes MD  Dx:   Encounter Diagnosis     ICD-10-CM    1  Status post total left knee replacement  Y39 [id number]                 DOS: 21       Subjective: Patient reported he is unsure if he did too much in the pool after last visit however woke the next morning with increased swelling and discomfort in L knee  He remains apprehensive with possibility of return to work as he feels being unsure he will be able to tolerate being on his feet and walking for eight hours  Objective: See treatment diary below  Assessment: He continues to maintain full knee flex with passive motion, some ext degrees lacking at rest with ability to achieve neutral with overpressure  Functional strength remains a deficit that we continue to address  Will work towards adding some job related activities (lunging, kneeling)  Plan: Continue per plan of care  Progress treatment as tolerated  Scheduled to see Dr Juana Benites tomorrow ()           Precautions: n/a    Manuals    PROM   L knee EH EH JF JF [facility] EH JF EH   Patella mob PROM - EH PROM - EH JF JF PROM - EH PROM - EH JF PROM -  EH   Extension mob     JF JF          Mod annalisa distraction mob     JF  Gr4 JF  Gr 4          IASTM to L distal quad and ITB                                     Neuro Re-Ed                  Quad set                  HS set                  S/L balance     3x30" 3x30"          Rocker board     3x30" 3x30" 30"x2  ea 1 min  ea nv 1 min   ea                                                            Ther Ex                  bike 7 min  timer 7 min  timer 7 min 7 min 7 min 5 min L1 10 min 10 min   Heel slides                  gastroc stretching UBE  30"x3 UBE  30"x3 UBE slant  30"x3 UBE slant  30"x3 UBE  Slant  30"x3 UBE  Slant  30"x3 UBE  Slant  30"x3 UBE slant  30"x3   SLR 3x10 3x10 3x10  1# 3x10    3x10 3x10 3x10 3x10   SAQ                  LAQ 2 5#  3" hold,  2x15 2 5#  3" hold, 2x15 3#  3" hold  3x15 4#  3" hold  3x15 4#  3" hold, 3x15 4#  3" hold, 3x15 nv 4#  3" hold, 3x15   Knee flex stretch off EOT                  Seated knee flexion                  Knee flex stretch on leg press 15"x  10  15"x   10 15"x10 15"  2x10  15"x   10  15"x   10 15"x10 See below   Mini squats at wall with hand support With cane  2x10  2x10 3x10 STS with YMB  2x10  STS   with    YMB   2x5  STS    with   YMB   2x5 STS with YMB 2x5 STS with YMB  2x5   Step ups NV  Wood  Stairs   6"   2x10    8" step     2x10 nv  8" step   up and    over   2x10  8"    up and   over   2x10 -    ecc control nv 8"  up and over  2x10 - ecc control   TKE [place]  15 0  x30  [place]   17 0   x30 Carmen  18 0  x30 [place]  18  0#  x30  [place]   18 0   x30  [place]   18 0   x30 nv [place]  19 0  x30   Step downs - eccentric control    4"   2x10 4"  2x10 nv  With    up and    over   on 8"  See   step    ups      Sit to stands with L bias    Foam  on    chair   x10              SL leg press -   seat 10        45#  2x10                      Ther Activity                                                        Gait Training                                                        Modalities                  Ice  10 min post def     10 min post 10 min post np NP   IE/HEP             ReEval

## 2021-08-02 ENCOUNTER — OFFICE VISIT (OUTPATIENT)
Dept: PHYSICAL THERAPY | Facility: CLINIC | Age: 61
End: 2021-08-02
Payer: COMMERCIAL

## 2021-08-02 DIAGNOSIS — M17.12 PRIMARY OSTEOARTHRITIS OF LEFT KNEE: Primary | ICD-10-CM

## 2021-08-02 PROCEDURE — 97140 MANUAL THERAPY 1/> REGIONS: CPT | Performed by: PHYSICAL THERAPIST

## 2021-08-02 PROCEDURE — 97110 THERAPEUTIC EXERCISES: CPT | Performed by: PHYSICAL THERAPIST

## 2021-08-02 NOTE — PROGRESS NOTES
Daily Note     Today's date: 2021  Patient name: Danie Mortimer  : 1960  MRN: [id number]  Referring provider: Bart Mari MD  Dx:   Encounter Diagnosis     ICD-10-CM    1  Primary osteoarthritis of left knee  M17 12                 DOS: 21       Subjective: Since last visit: Followed up with surgeon  Scheduled to return to work in two weeks time  Per patient was advised he is making good progress just needs to continue to work on strength  I read MD note which also discussed soft tissue work around patellar to potentially help with clicking  Coni Damonner tells me leg still feels weak and wobbly at times  No true instability  Objective: See treatment diary below  Progressed LE strengthening program   Still some inhibition noted with L quadriceps contraction  Assessment: Did well with progressions  In final phase of rehab working on maximizing functional strength and ROM  Patient would benefit from continued course of skilled physical therapy to address impairments in an effort to improve function  Plan: Continue per plan of care  Progress treatment as tolerated              Precautions: n/a    Manuals           PROM   L knee EH          Patella mob RG          Extension mob            STM to L distal quad   RG                                   Neuro Re-Ed                  Quad set  with heel prop  5sec x 20                Rocker board  NV                                                                    Ther Ex                  bike 10 min  timer          gastroc stretching UBE  30"x3          LAQ 4#  3" hold,  2x15          Knee flex stretch off EOT  NP                Seated knee flexion  NP                Sit to stand, low mat  Med Ball YMB  3 x 5          Step ups 8" up and over  3 x 10          TKE [place]  20 0  x30          SL leg press -   seat 10 55#  3 x 10           MH flexion  25#  2 x 15                Tband HS curls Green  3 x 10          Ther Activity                                                        Gait Training                                                        Modalities                  Ice  def          IE/HEP

## 2021-08-09 ENCOUNTER — OFFICE VISIT (OUTPATIENT)
Dept: PHYSICAL THERAPY | Facility: CLINIC | Age: 61
End: 2021-08-09
Payer: COMMERCIAL

## 2021-08-09 DIAGNOSIS — Z96.652 STATUS POST TOTAL LEFT KNEE REPLACEMENT: ICD-10-CM

## 2021-08-09 DIAGNOSIS — M17.12 PRIMARY OSTEOARTHRITIS OF LEFT KNEE: Primary | ICD-10-CM

## 2021-08-09 PROCEDURE — 97140 MANUAL THERAPY 1/> REGIONS: CPT | Performed by: PHYSICAL THERAPIST

## 2021-08-09 PROCEDURE — 97110 THERAPEUTIC EXERCISES: CPT | Performed by: PHYSICAL THERAPIST

## 2021-08-09 PROCEDURE — 97112 NEUROMUSCULAR REEDUCATION: CPT | Performed by: PHYSICAL THERAPIST

## 2021-08-09 NOTE — PROGRESS NOTES
Daily Note     Today's date: 2021  Patient name: Noemi Yepez  : 1960  MRN: [id number]  Referring provider: Stuart Wright MD  Dx:   Encounter Diagnosis     ICD-10-CM    1  Primary osteoarthritis of left knee  M17 12    2  Status post total left knee replacement  Z96 652                   Subjective: patient reports no issues last visit  Objective: See treatment diary below      Assessment: Patient tolerated treatment well  He demonstrates good technique throughout the session with minimal cues required  He was able to complete program as noted below with no reports of increased pain  He would benefit from continued physical therapy  Plan: Continue per plan of care        Precautions: n/a    Manuals          PROM   L knee EH SK         Patella mob RG SK         Extension mob            STM to L distal quad   RG                                   Neuro Re-Ed                  Quad set  with heel prop  5sec x 20  with heel prop  5sec x 20               Rocker board  NV  NV                                                                  Ther Ex                  bike 10 min  timer 10 min timer         gastroc stretching UBE  30"x3 UBE 30"x3         LAQ 4#  3" hold,  2x15 4#  3" hold,  2x15          Knee flex stretch off EOT  NP NP              Seated knee flexion  NP NP              Sit to stand, low mat  Med Ball YMB  3 x 5 YMB   3 x 5         Step ups 8" up and over  3 x 10 8" up and over  3 x 10         TKE [place]  20 0  x30 [place]  20 0  x30         SL leg press -   seat 10 55#  3 x 10 55#  3 x 10           MH flexion  25#  2 x 15 25#  2 x 15               Tband HS curls Green  3 x 10 Green 3x10         Ther Activity                                                        Gait Training                                                        Modalities                  Ice  def          IE/HEP

## 2021-08-16 ENCOUNTER — OFFICE VISIT (OUTPATIENT)
Dept: PHYSICAL THERAPY | Facility: CLINIC | Age: 61
End: 2021-08-16
Payer: COMMERCIAL

## 2021-08-16 DIAGNOSIS — M17.12 PRIMARY OSTEOARTHRITIS OF LEFT KNEE: ICD-10-CM

## 2021-08-16 DIAGNOSIS — Z96.652 STATUS POST TOTAL LEFT KNEE REPLACEMENT: Primary | ICD-10-CM

## 2021-08-16 PROCEDURE — 97110 THERAPEUTIC EXERCISES: CPT | Performed by: PHYSICAL THERAPIST

## 2021-08-16 PROCEDURE — 97140 MANUAL THERAPY 1/> REGIONS: CPT | Performed by: PHYSICAL THERAPIST

## 2021-08-16 NOTE — PROGRESS NOTES
Daily Note     Today's date: 2021  Patient name: Arleta Paget  : 1960  MRN: [id number]  Referring provider: Jessica Silva MD  Dx:   Encounter Diagnosis     ICD-10-CM    1  Status post total left knee replacement  Z96 652    2  Primary osteoarthritis of left knee  M17 12                   Subjective: Patient reports pain has been better  Continues to feel stiff by the end of the day  Objective: See treatment diary below  Progressed treatment as indicated below  Assessment: Patient was able to tolerate progression of resistance exercises without an increase in pain  They demonstrated muscular fatigue as expected with progression  Patient demonstrates improved functional strength, though continues to fatigue with exercise  Continue to progress as tolerated  Patient will continue to benefit from skilled PT in order to address impairments and improve function  Plan: Continue per plan of care  Progress treatment as tolerated         Precautions: n/a    Manuals         PROM   L knee EH SK         Patella mob RG SK         Extension mob            STM to L distal quad   RG                                   Neuro Re-Ed                  Quad set  with heel prop  5sec x 20  with heel prop  5sec x 20               Rocker board  NV  NV                                                                  Ther Ex                  bike 10 min  timer 10 min timer 10 min        gastroc stretching UBE  30"x3 UBE 30"x3 UBE30"x3        LAQ 4#  3" hold,  2x15 4#  3" hold,  2x15  5#  3" hold  2x15        Knee flex stretch off EOT  NP NP              Seated knee flexion  NP NP              Sit to stand, low mat  Med Ball YMB  3 x 5 YMB   3 x 5 YMB  3x5        Step ups 8" up and over  3 x 10 8" up and over  3 x 10 8" up and over 3x10        TKE [place]  20 0  x30 [place]  20 0  x30 [place]  22 5  x30        SL leg press -   seat 10 55#  3 x 10 55#  3 x 10  65#  3x10         MH flexion  25#  2 x 15 25#  2 x 15  40#  2x15            Tband HS curls Green  3 x 10 Green 3x10 Green 3x10        Ther Activity                                                        Gait Training                                                        Modalities                  Ice  def          IE/HEP

## 2021-08-19 DIAGNOSIS — G47.00 INSOMNIA, UNSPECIFIED TYPE: ICD-10-CM

## 2021-08-19 RX ORDER — ZOLPIDEM TARTRATE 12.5 MG/1
12.5 TABLET, FILM COATED, EXTENDED RELEASE ORAL
Qty: 30 TABLET | Refills: 5 | Status: SHIPPED | OUTPATIENT
Start: 2021-08-19 | End: 2021-11-18 | Stop reason: SDUPTHER

## 2021-08-24 ENCOUNTER — OFFICE VISIT (OUTPATIENT)
Dept: PHYSICAL THERAPY | Facility: CLINIC | Age: 61
End: 2021-08-24
Payer: COMMERCIAL

## 2021-08-24 DIAGNOSIS — M17.12 PRIMARY OSTEOARTHRITIS OF LEFT KNEE: ICD-10-CM

## 2021-08-24 DIAGNOSIS — Z96.652 STATUS POST TOTAL LEFT KNEE REPLACEMENT: Primary | ICD-10-CM

## 2021-08-24 PROCEDURE — 97112 NEUROMUSCULAR REEDUCATION: CPT | Performed by: PHYSICAL THERAPIST

## 2021-08-24 PROCEDURE — 97110 THERAPEUTIC EXERCISES: CPT | Performed by: PHYSICAL THERAPIST

## 2021-08-24 NOTE — PROGRESS NOTES
Daily Note     Today's date: 2021  Patient name: Sally Ross  : 1960  MRN: [id number]  Referring provider: Jesu Lopes MD  Dx:   Encounter Diagnosis     ICD-10-CM    1  Status post total left knee replacement  Z96 652    2  Primary osteoarthritis of left knee  M17 12                   Subjective: Patient reports that he continues to not have pain with functional activities  States that he sometimes feels "wobbly" when walking  Objective: See treatment diary below  FOTO: 72    Assessment:  Focused today's session on proprioception  Educated the patient on proprioception and how surgery plays into our balance  Patient demonstrated decreased SL static and dynamic balance on the surgical side as expected  Patient required VC to avoid knee valgus during STS with increased weight  Continue to progress dynamic balance exercises as tolerated  Patient will continue to benefit from skilled PT in order to address impairments and improve function  Plan: Continue per plan of care  Progress treatment as tolerated         Precautions: n/a    Manuals        PROM   L knee EH SK         Patella mob RG SK         Extension mob            STM to L distal quad   RG                                   Neuro Re-Ed                  Quad set  with heel prop  5sec x 20  with heel prop  5sec x 20               Rocker board  NV  NV          SLS       3x30"           walking march        2x20ft           tandem walk        2x20ft          BWD walk    2x20ft       biodex    Weight shifting  x2       Ther Ex                  bike 10 min  timer 10 min timer 10 min 10 min       gastroc stretching UBE  30"x3 UBE 30"x3 UBE30"x3 UBE 30" x3       LAQ 4#  3" hold,  2x15 4#  3" hold,  2x15  5#  3" hold  2x15        Knee flex stretch off EOT  NP NP              Seated knee flexion  NP NP              Sit to stand, low mat  Med Ball YMB  3 x 5 YMB   3 x 5 YMB  3x5 BMB  3x5    OTB around knees        Step ups 8" up and over  3 x 10 8" up and over  3 x 10 8" up and over 3x10 np       TKE [place]  20 0  x30 [place]  20 0  x30 [place]  22 5  x30 [place]  22 5  x30       SL leg press -   seat 10 55#  3 x 10 55#  3 x 10  65#  3x10 np        MH flexion  25#  2 x 15 25#  2 x 15  40#  2x15  np          Tband HS curls Green  3 x 10 Green 3x10 Green 3x10 np       Ther Activity                                                        Gait Training                                                        Modalities                  Ice  def          IE/HEP

## 2021-09-03 ENCOUNTER — OFFICE VISIT (OUTPATIENT)
Dept: PHYSICAL THERAPY | Facility: CLINIC | Age: 61
End: 2021-09-03
Payer: COMMERCIAL

## 2021-09-03 DIAGNOSIS — Z96.652 STATUS POST TOTAL LEFT KNEE REPLACEMENT: ICD-10-CM

## 2021-09-03 DIAGNOSIS — M17.12 PRIMARY OSTEOARTHRITIS OF LEFT KNEE: Primary | ICD-10-CM

## 2021-09-03 PROCEDURE — 97112 NEUROMUSCULAR REEDUCATION: CPT

## 2021-09-03 PROCEDURE — 97110 THERAPEUTIC EXERCISES: CPT

## 2021-09-03 NOTE — PROGRESS NOTES
Daily Note     Today's date: 9/3/2021  Patient name: Luisito Corral  : 1960  MRN: [id number]  Referring provider: Remy Lockhart MD  Dx:   Encounter Diagnosis     ICD-10-CM    1  Primary osteoarthritis of left knee  M17 12    2  Status post total left knee replacement  Z96 652                   Subjective: Patient has been back at work for over two weeks now  Some nights he can walk several miles at work and experience minimal swelling or discomfort  Did see ortho earlier this week, pleased with his progress overall  Objective: See treatment diary below  Assessment: More fluid motion with sit to stand transfers  He had two to three instances of stepping strategy used during dynamic amb activities  By third rep of SL stance he was unable to hold beyond 20 sec due to fatigue, no pain  See plan below  Plan: Continue per plan of care  Progress treatment as tolerated       Feels confident that next visit he is ready for discharge to independent home program          Precautions: n/a    Manuals 8/2 8/9 8/16 8/24 9/3      PROM   L knee EH SK         Patella mob RG SK         Extension mob            STM to L distal quad   RG                                   Neuro Re-Ed                  Quad set  with heel prop  5sec x 20  with heel prop  5sec x 20               Rocker board  NV  NV         SLS       3x30"  30"x4        walking march        2x20ft  20 ft   x2        tandem   walk        2x20ft  20 ft   x2        BWD   walk    2x20ft  20 ft   x2      biodex    Weight shifting  x2 Motor control - med  x2                 Ther Ex                  bike 10 min  timer 10 min timer 10 min 10 min 10 min  L3      gastroc stretching UBE  30"x3 UBE 30"x3 UBE30"x3 UBE 30" x3 UBE  30"x3      LAQ 4#  3" hold,  2x15 4#  3" hold,  2x15  5#  3" hold  2x15        Knee flex stretch off EOT  NP NP              Seated knee flexion  NP NP              Sit to stand, low mat  Med Ball YMB  3 x 5 YMB   3 x 5 YMB  3x5 BMB  3x5    OTB around knees  BMB with OTB around knees  3x5        Step ups 8" up and over  3 x 10 8" up and over  3 x 10 8" up and over 3x10 np       TKE [place]  20 0  x30 [place]  20 0  x30 [place]  22 5  x30 [place]  22 5  x30 [place]  22 5  x30      SL leg press -   seat 10 55#  3 x 10 55#  3 x 10  65#  3x10 np       MH flexion  25#  2 x 15 25#  2 x 15  40#  2x15  np          Tband HS curls Green  3 x 10 Green 3x10 Green 3x10 np                             Ther Activity                                                        Gait Training                                                        Modalities                  Ice  def          IE/HEP

## 2021-09-07 ENCOUNTER — OFFICE VISIT (OUTPATIENT)
Dept: PHYSICAL THERAPY | Facility: CLINIC | Age: 61
End: 2021-09-07
Payer: COMMERCIAL

## 2021-09-07 DIAGNOSIS — M17.12 PRIMARY OSTEOARTHRITIS OF LEFT KNEE: Primary | ICD-10-CM

## 2021-09-07 DIAGNOSIS — Z96.652 STATUS POST TOTAL LEFT KNEE REPLACEMENT: ICD-10-CM

## 2021-09-07 PROCEDURE — 97110 THERAPEUTIC EXERCISES: CPT | Performed by: PHYSICAL THERAPIST

## 2021-09-07 NOTE — PROGRESS NOTES
PT Discharge    Today's date: 2021   Patient name: Jessica Mcdaniels  : 1960  MRN: [id number]  Referring provider: Lisa Phillips MD  Dx:   Encounter Diagnosis     ICD-10-CM    1  Status post total left knee replacement  Z96 652                   Assessment/Plan  Patient is a 56 y o  male who presented to OP PT s/p TKA by Dr Alexandra Tamayo on 21  Patient is now 18 weeks post-op  He has attended 24 visits over the last 14 weeks  Since starting therapy pt has made the following progress towards goals:  1) Pain: Patient denies pain as this time  2) range of motion: AROM has improved to -4 to 120 degrees, PROM has improved to 0-125 degrees  3) self rated functional score: FOTO has improved to 63 from [place] [address] has been compliant with attending PT and home exercise program since initial eval  he has made improvements in objective data and achieved desired functional goals  Patient reports having returned to their prior level or function  It was mutually agreed to discharge to home exercise program at this time  Patient has good understanding of provided home exercise program and was instructed to call with any questions or if issues should arise  See updated goals below  Goals  Short Term Goals:  1) Pain : Decrease R knee  pain to 3/10 at worst x 1 continuous week within 4 weeks  - MET  2) AROM: Improve R knee AROM to at least 120 degrees flexion,  to have 0 degrees extension within 4 weeks  - partially met  3) Strength:R  LE strength to be at least 4+/5 for all hip and knee within 4 weeks- MET  4) Function: Improved FOTO score from IE within 4 weeks (30 at IE), patient to note greater ease of ambulation subjectively  Transition to not using AD for community ambulation within 3-4 weeks  -  MET    LongTerm Goals:  1) Pain : Eliminate R knee pain  x 1 continuous week within 8 weeks  - MET  2) Swelling: Eliminate R knee swelling within 8-10 weeks- MET  3) AROM: Improve R knee AROM to  0-130 degrees within 8 weeks  - progressing, PROM at 125 deg  4) Strength: Improved R LE strength to 5/5 within 8 weeks  - MET  5) Function: Improved FOTO score to at least 61; Normal gait without AD, no reported difficulty with ADLs as they pertain to L knee within 8-10 weeks  - MET  6) (I) with HEP within 8 weeks  Plan  Plan details: DC to home with HEP  Patient would benefit from: home exercise program  Treatment plan discussed with: patient    Subjective  UPDATE 9/7/2021: Patient states that he has no pain  Knee feels better everyday  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  Feels 85% better overall  Knows it will take up to 1 year to feel "normal"     UPDATE: Patient reports that his knee feels better everyday  Was sore over the weekend from walking around on uneven ground  States that his knee feels 80% better  Relevant details from IE:   Patient is a 56 y o  male who presents for an initial outpatient physical therapy consultation s/p L TKA on 5/5/21 with Dr Adair Pate  Current c/o L knee pain and stretching with bending his knee or getting up out of chair  Patient reports that surgery went well  Patient has had home physical therapy for 2 5 weeks post-op  He thinks that he is starting to be able to get around easier  He states that he is still having difficulty with getting up and down out of a chair or getting in/ out of a car  Patient prefers sleeping on his left side and has had trouble getting comfortable at night  Patient has a full set of stairs at home, and has been ascending/descending non-reciprocally, "up with the good, down with the bad"  Patient currently taken celebrex and tylenol  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  No    Pain  Best: 0 (was 2)  Worst: 0 (was 6)  Location: (was lateral quad)  Progression: Improved    Objective     Observations   Left Knee   Negative for edema    Additional Observation Details  Incision appears well healing without s/s infection    Active Range of Motion   Left Knee   Flexion: 125 degrees (was 75 degrees)  Extension: -2 degrees (was -10 degrees)    Right Knee   Normal active range of motion    Passive Range of Motion   Left Knee   Flexion: 125 degrees (was 80 degrees)  Extension: 0 degrees (was -6 degrees)    Right Knee   Normal passive range of motion    Strength/Myotome Testing     Left Hip   Planes of Motion   Flexion: 5 (was 4)  Abduction: 5 (was 4-)    Right Hip   Planes of Motion   Flexion: 5  Abduction: 5 (was 4+)    Left Knee   Flexion: 5 (was 3+)  Extension: 5 (was 3+)  Quadriceps contraction: poor    Right Knee   Normal strength  Flexion: 5  Extension: 5  Quadriceps contraction: good    Left Ankle/Foot   Dorsiflexion: 5 (was 4+)  Right Ankle/Foot   Dorsiflexion: 5    Ambulation     Observational Gait   Gait:Patient ambulates at PLOF without AD       Knee Comments  TUG: 10 9s w/o AD (was 15 5s w/SPC and UE support upon standing/sitting)    Precautions: n/a  Manuals 8/2 8/9 8/16 8/24 9/3  9/7       PROM   L knee EH SK               Patella mob RG SK               Extension mob                   STM to L distal quad   RG                                     Neuro Re-Ed                   Quad set  with heel prop  5sec x 20  with heel prop  5sec x 20                Rocker board  NV  NV               SLS       3x30"  30"x4         walking march        2x20ft  20 ft   x2         tandem   walk        2x20ft  20 ft   x2         BWD   walk       2x20ft  20 ft   x2         biodex       Weight shifting  x2 Motor control - med  x2                             Ther Ex                   bike 10 min  timer 10 min timer 10 min 10 min 10 min  L3  10 min L4       gastroc stretching UBE  30"x3 UBE 30"x3 UBE30"x3 UBE 30" x3 UBE  30"x3  UBE  30"x3       LAQ 4#  3" hold,  2x15 4#  3" hold,  2x15  5#  3" hold  2x15             Knee flex stretch off EOT  NP NP               Seated knee flexion  NP NP               Sit to stand, low mat  Med Ball YMB  3 x 5 YMB   3 x 5 YMB  3x5 BMB  3x5     OTB around knees  BMB with OTB around knees  3x5            Step ups 8" up and over  3 x 10 8" up and over  3 x 10 8" up and over 3x10 np           TKE [place]  20 0  x30 [place]  20 0  x30 [place]  22 5  x30 [place]  22 5  x30 [place]  22 5  x30         SL leg press -   seat 10 55#  3 x 10 55#  3 x 10  65#  3x10 np           MH flexion  25#  2 x 15 25#  2 x 15  40#  2x15  np           Tband HS curls Green  3 x 10 Green 3x10 Green 3x10 np                                                   Ther Activity                                                           Gait Training                                                           Modalities                   Ice  def                 IE/HEP            JF  Re-eval

## 2021-11-18 ENCOUNTER — TELEPHONE (OUTPATIENT)
Dept: FAMILY MEDICINE CLINIC | Facility: CLINIC | Age: 61
End: 2021-11-18

## 2021-11-18 DIAGNOSIS — G47.00 INSOMNIA, UNSPECIFIED TYPE: ICD-10-CM

## 2021-11-18 RX ORDER — ZOLPIDEM TARTRATE 12.5 MG/1
12.5 TABLET, FILM COATED, EXTENDED RELEASE ORAL
Qty: 30 TABLET | Refills: 5 | Status: SHIPPED | OUTPATIENT
Start: 2021-11-18 | End: 2022-05-19 | Stop reason: SDUPTHER

## 2021-11-23 ENCOUNTER — TELEPHONE (OUTPATIENT)
Dept: FAMILY MEDICINE CLINIC | Facility: CLINIC | Age: 61
End: 2021-11-23

## 2022-01-17 ENCOUNTER — OFFICE VISIT (OUTPATIENT)
Dept: URGENT CARE | Facility: CLINIC | Age: 62
End: 2022-01-17
Payer: COMMERCIAL

## 2022-01-17 VITALS — RESPIRATION RATE: 18 BRPM | HEART RATE: 80 BPM | OXYGEN SATURATION: 98 % | TEMPERATURE: 97.6 F

## 2022-01-17 DIAGNOSIS — B96.89 BACTERIAL URI: Primary | ICD-10-CM

## 2022-01-17 DIAGNOSIS — J06.9 BACTERIAL URI: Primary | ICD-10-CM

## 2022-01-17 PROCEDURE — 99213 OFFICE O/P EST LOW 20 MIN: CPT | Performed by: NURSE PRACTITIONER

## 2022-01-17 RX ORDER — AZITHROMYCIN 250 MG/1
TABLET, FILM COATED ORAL
Qty: 6 TABLET | Refills: 0 | Status: SHIPPED | OUTPATIENT
Start: 2022-01-17 | End: 2022-01-21

## 2022-01-17 NOTE — PROGRESS NOTES
[facility]        NAME: Kelvin Thorpe is a 59 y o  male  : 1960    MRN: [id number]  DATE: 2022  TIME: 8:33 AM    Assessment and Plan   Bacterial URI [J06 9, B96 89]  1  Bacterial URI  azithromycin (ZITHROMAX) 250 mg tablet     Start course of zpak   Recommend taking allegra   F/u with pcp in 3-5 days    Patient Instructions     Follow up with PCP in 3-5 days  Proceed to  ER if symptoms worsen  Chief Complaint     Chief Complaint   Patient presents with   Caitlyn Ou Like Symptoms     Patient states that he has had a cold for 2 weeks  Dry cough, runny nose like a "faucet" and head congestion  Patient took 2 at home COVID tests and they were negative         History of Present Illness   Kelvin Thorpe presents to the clinic c/o    Cold Like Symptoms (Patient states that he has had a cold for 2 weeks  Dry cough, runny nose like a "faucet" and head congestion  Patient took 2 at home COVID tests and they were negative)  Symptoms started around xmas - was present for 2-3 days and went away  Started back in the new year - has not stopped  Taking dayquil and nyquil with no relief  Review of Systems   Review of Systems   All other systems reviewed and are negative          Current Medications     Long-Term Medications   Medication Sig Dispense Refill    ibuprofen (MOTRIN) 200 mg tablet Take by mouth every 6 (six) hours as needed for mild pain      zolpidem (AMBIEN CR) 12 5 MG CR tablet Take 1 tablet (12 5 mg total) by mouth daily at bedtime as needed for sleep 30 tablet 5       Current Allergies     Allergies as of 2022    (No Known Allergies)            The following portions of the patient's history were reviewed and updated as appropriate: allergies, current medications, past family history, past medical history, past social history, past surgical history and problem list     Objective   Pulse 80   Temp 97 6 °F (36 4 °C) (Tympanic)   Resp 18   SpO2 98%        Physical Exam     Physical Exam  Vitals and nursing note reviewed  Constitutional:       Appearance: Normal appearance  He is well-developed  HENT:      Head: Normocephalic and atraumatic  Right Ear: Hearing, ear canal and external ear normal  A middle ear effusion is present  Left Ear: Hearing, ear canal and external ear normal  A middle ear effusion is present  Nose: Mucosal edema and congestion present  Mouth/Throat:      Lips: Pink  Mouth: Mucous membranes are moist    Eyes:      General: Lids are normal       Conjunctiva/sclera: Conjunctivae normal       Pupils: Pupils are equal, round, and reactive to light  Cardiovascular:      Rate and Rhythm: Normal rate and regular rhythm  Heart sounds: Normal heart sounds, S1 normal and S2 normal    Pulmonary:      Effort: Pulmonary effort is normal       Breath sounds: Normal breath sounds  Skin:     General: Skin is warm and dry  Neurological:      Mental Status: He is alert  Psychiatric:         Speech: Speech normal          Behavior: Behavior normal          Thought Content:  Thought content normal          Judgment: Judgment normal

## 2022-01-17 NOTE — PATIENT INSTRUCTIONS
Cold Symptoms   AMBULATORY CARE:   Cold symptoms  include sneezing, dry throat, a stuffy nose, headache, watery eyes, and a cough  Your cough may be dry, or you may cough up mucus  You may also have muscle aches, joint pain, and tiredness  Rarely, you may have a fever  Cold symptoms occur from inflammation in your upper respiratory system caused by a virus  Most colds go away without treatment  Seek care immediately if:   · You have increased tiredness and weakness  · You are unable to eat  · Your heart is beating much faster than usual for you  · You see white spots in the back of your throat and your neck is swollen and sore to the touch  · You see pinpoint or larger reddish-purple dots on your skin  Contact your healthcare provider if:   · You have a fever higher than 102°F (38 9°C)  · You have new or worsening shortness of breath  · You have thick nasal drainage for more than 10 days  · Your symptoms do not improve or get worse within 7 days  · You have questions or concerns about your condition or care  Treatment for cold symptoms  may include NSAIDS to decrease muscle aches and fever  Cold medicines may also be given to decrease coughing, nasal stuffiness, sneezing, and a runny nose  Manage your cold symptoms: The following may help relieve cold symptoms, such as a dry throat and congestion:  · Gargle with mouthwash or warm salt water as directed  · Suck on throat lozenges or hard candy  · Use a cold or warm vaporizer or humidifier to ease your breathing  · Rest for at least 2 days and then as needed to decrease tiredness and weakness  · Use petroleum based jelly around your nostrils to decrease irritation from blowing your nose  · Drink plenty of liquids  Liquids will help thin and loosen thick mucus so you can cough it up  Liquids will also keep you hydrated   Ask your healthcare provider which liquids are best for you and how much to drink each day     Prevent the spread of germs  by washing your hands often  You can spread your cold germs to others for at least 3 days after your symptoms start  Do not share items, such as eating utensils  Cover your nose and mouth when you cough or sneeze using the crook of your elbow instead of your hands  Throw used tissues in the garbage  Do not smoke:  Smoking may worsen your symptoms and increase the length of time you feel sick  Talk with your healthcare provider if you need help to stop smoking  Follow up with your doctor as directed:  Write down your questions so you remember to ask them during your visits  © Copyright [employer] 2021 Information is for End User's use only and may not be sold, redistributed or otherwise used for commercial purposes  All illustrations and images included in CareNotes® are the copyrighted property of [employer]  or Kyree Dwyer  The above information is an  only  It is not intended as medical advice for individual conditions or treatments  Talk to your doctor, nurse or pharmacist before following any medical regimen to see if it is safe and effective for you

## 2022-05-19 ENCOUNTER — OFFICE VISIT (OUTPATIENT)
Dept: FAMILY MEDICINE CLINIC | Facility: CLINIC | Age: 62
End: 2022-05-19
Payer: COMMERCIAL

## 2022-05-19 VITALS
HEIGHT: 75 IN | DIASTOLIC BLOOD PRESSURE: 72 MMHG | WEIGHT: 215.8 LBS | BODY MASS INDEX: 26.83 KG/M2 | SYSTOLIC BLOOD PRESSURE: 124 MMHG | HEART RATE: 62 BPM

## 2022-05-19 DIAGNOSIS — Z13.6 SCREENING FOR CARDIOVASCULAR CONDITION: ICD-10-CM

## 2022-05-19 DIAGNOSIS — Z13.1 SCREENING FOR DIABETES MELLITUS: ICD-10-CM

## 2022-05-19 DIAGNOSIS — R68.89 COLD INTOLERANCE: Primary | ICD-10-CM

## 2022-05-19 DIAGNOSIS — G47.00 INSOMNIA, UNSPECIFIED TYPE: ICD-10-CM

## 2022-05-19 PROBLEM — M17.12 PRIMARY OSTEOARTHRITIS OF LEFT KNEE: Status: RESOLVED | Noted: 2021-04-15 | Resolved: 2022-05-19

## 2022-05-19 PROBLEM — M24.112 LABRAL TEAR OF SHOULDER, DEGENERATIVE, LEFT: Status: RESOLVED | Noted: 2019-01-14 | Resolved: 2022-05-19

## 2022-05-19 PROCEDURE — 3725F SCREEN DEPRESSION PERFORMED: CPT | Performed by: FAMILY MEDICINE

## 2022-05-19 PROCEDURE — 3008F BODY MASS INDEX DOCD: CPT | Performed by: FAMILY MEDICINE

## 2022-05-19 PROCEDURE — 1036F TOBACCO NON-USER: CPT | Performed by: FAMILY MEDICINE

## 2022-05-19 PROCEDURE — 99213 OFFICE O/P EST LOW 20 MIN: CPT | Performed by: FAMILY MEDICINE

## 2022-05-19 RX ORDER — ZOLPIDEM TARTRATE 12.5 MG/1
12.5 TABLET, FILM COATED, EXTENDED RELEASE ORAL
Qty: 30 TABLET | Refills: 5 | Status: SHIPPED | OUTPATIENT
Start: 2022-05-19

## 2022-05-19 NOTE — PROGRESS NOTES
Assessment and Plan:    Problem List Items Addressed This Visit        Other    Insomnia     Stable on Ambien           Relevant Medications    zolpidem (AMBIEN CR) 12 5 MG CR tablet      Other Visit Diagnoses     BMI 26 0-26 9,adult    -  Primary    Screening for cardiovascular condition        Relevant Orders    Lipid panel    Screening for diabetes mellitus        Relevant Orders    Comprehensive metabolic panel                 Diagnoses and all orders for this visit:    BMI 26 0-26 9,adult    Insomnia, unspecified type  -     zolpidem (AMBIEN CR) 12 5 MG CR tablet; Take 1 tablet (12 5 mg total) by mouth daily at bedtime as needed for sleep    Screening for cardiovascular condition  -     Lipid panel; Future    Screening for diabetes mellitus  -     Comprehensive metabolic panel; Future            Subjective:      Patient ID: Maria Elena Brunson is a 59 y o  male  CC:    Chief Complaint   Patient presents with    Follow-up     PT is present today for chronic condition follow up and medication refills       HPI:    Follow up for insomnia,works night shift,plans to retire at 77      The following portions of the patient's history were reviewed and updated as appropriate: allergies, current medications, past family history, past medical history, past social history, past surgical history and problem list       Review of Systems   Constitutional: Negative for activity change, appetite change and fatigue  Respiratory: Negative for shortness of breath  Cardiovascular: Negative for chest pain  Neurological: Negative for dizziness and light-headedness  Psychiatric/Behavioral: Positive for sleep disturbance  The patient is not nervous/anxious  Data to review:       Objective:    Vitals:    05/19/22 0925   BP: 124/72   BP Location: Left arm   Patient Position: Sitting   Cuff Size: Standard   Pulse: 62   Weight: 97 9 kg (215 lb 12 8 oz)   Height: 6' 3" (1 905 m)        Physical Exam  Vitals reviewed  Constitutional:       Appearance: Normal appearance  Cardiovascular:      Rate and Rhythm: Normal rate and regular rhythm  Pulses: Normal pulses  Heart sounds: Normal heart sounds  Pulmonary:      Effort: Pulmonary effort is normal       Breath sounds: Normal breath sounds  Neurological:      Mental Status: He is alert  Psychiatric:         Mood and Affect: Mood normal          BMI Counseling: Body mass index is 26 97 kg/m²  The BMI is above normal  Nutrition recommendations include reducing portion sizes, decreasing overall calorie intake, 3-5 servings of fruits/vegetables daily, reducing fast food intake and consuming healthier snacks  Exercise recommendations include exercising 3-5 times per week

## 2022-05-19 NOTE — PROGRESS NOTES
BMI Counseling: Body mass index is 26 97 kg/m²  The BMI is above normal  Nutrition recommendations include reducing portion sizes, decreasing overall calorie intake, 3-5 servings of fruits/vegetables daily, reducing fast food intake and consuming healthier snacks  Exercise recommendations include exercising 3-5 times per week

## 2022-05-19 NOTE — PATIENT INSTRUCTIONS
[facility] lab, refill med  Weight Management   AMBULATORY CARE:   Why it is important to manage your weight:  Being overweight increases your risk of health conditions such as heart disease, high blood pressure, type 2 diabetes, and certain types of cancer  It can also increase your risk for osteoarthritis, sleep apnea, and other respiratory problems  Aim for a slow, steady weight loss  Even a small amount of weight loss can lower your risk of health problems  Risks of being overweight:  Extra weight can cause many health problems, including the following:  Diabetes (high blood sugar level)    High blood pressure or high cholesterol    Heart disease    Stroke    Gallbladder or liver disease    Cancer of the colon, breast, prostate, liver, or kidney    Sleep apnea    Arthritis or gout    Screening  is done to check for health conditions before you have signs or symptoms  If you are 28to 79years old, your blood sugar level may be checked every 3 years for signs of prediabetes or diabetes  Your healthcare provider will check your blood pressure at each visit  High blood pressure can lead to a stroke or other problems  Your provider may check for signs of heart disease, cancer, or other health problems  How to lose weight safely:  A safe and healthy way to lose weight is to eat fewer calories and get regular exercise  You can lose up about 1 pound a week by decreasing the number of calories you eat by 500 calories each day  You can decrease calories by eating smaller portion sizes or by cutting out high-calorie foods  Read labels to find out how many calories are in the foods you eat  You can also burn calories with exercise such as walking, swimming, or biking  You will be more likely to keep weight off if you make these changes part of your lifestyle  Exercise at least 30 minutes per day on most days of the week   You can also fit in more physical activity by taking the stairs instead of the elevator or parking farther away from stores  Ask your healthcare provider about the best exercise plan for you  Healthy meal plan for weight management:  A healthy meal plan includes a variety of foods, contains fewer calories, and helps you stay healthy  A healthy meal plan includes the following:     Eat whole-grain foods more often  A healthy meal plan should contain fiber  Fiber is the part of grains, fruits, and vegetables that is not broken down by your body  Whole-grain foods are healthy and provide extra fiber in your diet  Some examples of whole-grain foods are whole-wheat breads and pastas, oatmeal, brown rice, and bulgur  Eat a variety of vegetables every day  Include dark, leafy greens such as spinach, kale, krissy greens, and mustard greens  Eat yellow and orange vegetables such as carrots, sweet potatoes, and winter squash  Eat a variety of fruits every day  Choose fresh or canned fruit (canned in its own juice or light syrup) instead of juice  Fruit juice has very little or no fiber  Eat low-fat dairy foods  Drink fat-free (skim) milk or 1% milk  Eat fat-free yogurt and low-fat cottage cheese  Try low-fat cheeses such as mozzarella and other reduced-fat cheeses  Choose meat and other protein foods that are low in fat  Choose beans or other legumes such as split peas or lentils  Choose fish, skinless poultry (chicken or turkey), or lean cuts of red meat (beef or pork)  Before you cook meat or poultry, cut off any visible fat  Use less fat and oil  Try baking foods instead of frying them  Add less fat, such as margarine, sour cream, regular salad dressing and mayonnaise to foods  Eat fewer high-fat foods  Some examples of high-fat foods include french fries, doughnuts, ice cream, and cakes  Eat fewer sweets  Limit foods and drinks that are high in sugar  This includes candy, cookies, regular soda, and sweetened drinks  Ways to decrease calories:   Eat smaller portions       Use a small plate with smaller servings  Do not eat second helpings  When you eat at a restaurant, ask for a box and place half of your meal in the box before you eat  Share an entrée with someone else  Replace high-calorie snacks with healthy, low-calorie snacks  Choose fresh fruit, vegetables, fat-free rice cakes, or air-popped popcorn instead of potato chips, nuts, or chocolate  Choose water or calorie-free drinks instead of soda or sweetened drinks  Do not shop for groceries when you are hungry  You may be more likely to make unhealthy food choices  Take a grocery list of healthy foods and shop after you have eaten  Eat regular meals  Do not skip meals  Skipping meals can lead to overeating later in the day  This can make it harder for you to lose weight  Eat a healthy snack in place of a meal if you do not have time to eat a regular meal  Talk with a dietitian to help you create a meal plan and schedule that is right for you  Other things to consider as you try to lose weight:   Be aware of situations that may give you the urge to overeat, such as eating while watching television  Find ways to avoid these situations  For example, read a book, go for a walk, or do crafts  Meet with a weight loss support group or friends who are also trying to lose weight  This may help you stay motivated to continue working on your weight loss goals  © Copyright [employer] 2022 Information is for End User's use only and may not be sold, redistributed or otherwise used for commercial purposes  All illustrations and images included in CareNotes® are the copyrighted property of [employer]  or Kyree Mott   The above information is an  only  It is not intended as medical advice for individual conditions or treatments  Talk to your doctor, nurse or pharmacist before following any medical regimen to see if it is safe and effective for you

## 2022-05-31 ENCOUNTER — APPOINTMENT (OUTPATIENT)
Dept: LAB | Facility: CLINIC | Age: 62
End: 2022-05-31
Payer: COMMERCIAL

## 2022-05-31 DIAGNOSIS — R68.89 COLD INTOLERANCE: ICD-10-CM

## 2022-05-31 DIAGNOSIS — Z13.6 SCREENING FOR CARDIOVASCULAR CONDITION: ICD-10-CM

## 2022-05-31 DIAGNOSIS — Z13.1 SCREENING FOR DIABETES MELLITUS: ICD-10-CM

## 2022-05-31 LAB
ALBUMIN SERPL BCP-MCNC: 3.5 G/DL (ref 3.5–5)
ALP SERPL-CCNC: 103 U/L (ref 46–116)
ALT SERPL W P-5'-P-CCNC: 19 U/L (ref 12–78)
ANION GAP SERPL CALCULATED.3IONS-SCNC: 5 MMOL/L (ref 4–13)
AST SERPL W P-5'-P-CCNC: 16 U/L (ref 5–45)
BASOPHILS # BLD AUTO: 0.06 THOUSANDS/ΜL (ref 0–0.1)
BASOPHILS NFR BLD AUTO: 1 % (ref 0–1)
BILIRUB SERPL-MCNC: 0.57 MG/DL (ref 0.2–1)
BUN SERPL-MCNC: 18 MG/DL (ref 5–25)
CALCIUM SERPL-MCNC: 8.6 MG/DL (ref 8.3–10.1)
CHLORIDE SERPL-SCNC: 105 MMOL/L (ref 100–108)
CHOLEST SERPL-MCNC: 166 MG/DL
CO2 SERPL-SCNC: 27 MMOL/L (ref 21–32)
CREAT SERPL-MCNC: 1.44 MG/DL (ref 0.6–1.3)
EOSINOPHIL # BLD AUTO: 0.51 THOUSAND/ΜL (ref 0–0.61)
EOSINOPHIL NFR BLD AUTO: 10 % (ref 0–6)
ERYTHROCYTE [DISTWIDTH] IN BLOOD BY AUTOMATED COUNT: 12.5 % (ref 11.6–15.1)
GFR SERPL CREATININE-BSD FRML MDRD: 51 ML/MIN/1.73SQ M
GLUCOSE P FAST SERPL-MCNC: 106 MG/DL (ref 65–99)
HCT VFR BLD AUTO: 40.9 % (ref 36.5–49.3)
HDLC SERPL-MCNC: 46 MG/DL
HGB BLD-MCNC: 13.1 G/DL (ref 12–17)
IMM GRANULOCYTES # BLD AUTO: 0.01 THOUSAND/UL (ref 0–0.2)
IMM GRANULOCYTES NFR BLD AUTO: 0 % (ref 0–2)
LDLC SERPL CALC-MCNC: 95 MG/DL (ref 0–100)
LYMPHOCYTES # BLD AUTO: 1.74 THOUSANDS/ΜL (ref 0.6–4.47)
LYMPHOCYTES NFR BLD AUTO: 34 % (ref 14–44)
MCH RBC QN AUTO: 29.4 PG (ref 26.8–34.3)
MCHC RBC AUTO-ENTMCNC: 32 G/DL (ref 31.4–37.4)
MCV RBC AUTO: 92 FL (ref 82–98)
MONOCYTES # BLD AUTO: 0.83 THOUSAND/ΜL (ref 0.17–1.22)
MONOCYTES NFR BLD AUTO: 16 % (ref 4–12)
NEUTROPHILS # BLD AUTO: 1.93 THOUSANDS/ΜL (ref 1.85–7.62)
NEUTS SEG NFR BLD AUTO: 39 % (ref 43–75)
NONHDLC SERPL-MCNC: 120 MG/DL
NRBC BLD AUTO-RTO: 0 /100 WBCS
PLATELET # BLD AUTO: 218 THOUSANDS/UL (ref 149–390)
PMV BLD AUTO: 10.1 FL (ref 8.9–12.7)
POTASSIUM SERPL-SCNC: 4.2 MMOL/L (ref 3.5–5.3)
PROT SERPL-MCNC: 7.2 G/DL (ref 6.4–8.2)
RBC # BLD AUTO: 4.45 MILLION/UL (ref 3.88–5.62)
SODIUM SERPL-SCNC: 137 MMOL/L (ref 136–145)
TRIGL SERPL-MCNC: 127 MG/DL
TSH SERPL DL<=0.05 MIU/L-ACNC: 3.55 UIU/ML (ref 0.45–4.5)
WBC # BLD AUTO: 5.08 THOUSAND/UL (ref 4.31–10.16)

## 2022-05-31 PROCEDURE — 80061 LIPID PANEL: CPT

## 2022-05-31 PROCEDURE — 84443 ASSAY THYROID STIM HORMONE: CPT

## 2022-05-31 PROCEDURE — 80053 COMPREHEN METABOLIC PANEL: CPT

## 2022-05-31 PROCEDURE — 85025 COMPLETE CBC W/AUTO DIFF WBC: CPT

## 2022-05-31 PROCEDURE — 36415 COLL VENOUS BLD VENIPUNCTURE: CPT

## 2022-06-01 ENCOUNTER — TELEPHONE (OUTPATIENT)
Dept: OTHER | Facility: OTHER | Age: 62
End: 2022-06-01

## 2022-06-01 DIAGNOSIS — Z13.1 SCREENING FOR DIABETES MELLITUS: Primary | ICD-10-CM

## 2022-06-01 NOTE — TELEPHONE ENCOUNTER
Patient is calling; he said office called his wife yesterday and told her that he could have a a repeat of his blood test done due to his kidney levels  Patient went to have the blood drawn today but no new blood work studies were ordered in [address] [place] [address] Rd  Please call patient to discuss

## 2022-06-24 ENCOUNTER — OFFICE VISIT (OUTPATIENT)
Dept: URGENT CARE | Facility: CLINIC | Age: 62
End: 2022-06-24
Payer: COMMERCIAL

## 2022-06-24 VITALS
HEART RATE: 81 BPM | OXYGEN SATURATION: 98 % | TEMPERATURE: 97.2 F | SYSTOLIC BLOOD PRESSURE: 124 MMHG | DIASTOLIC BLOOD PRESSURE: 76 MMHG | RESPIRATION RATE: 18 BRPM

## 2022-06-24 DIAGNOSIS — S90.222A SUBUNGUAL HEMATOMA OF FIFTH TOE OF LEFT FOOT, INITIAL ENCOUNTER: Primary | ICD-10-CM

## 2022-06-24 PROCEDURE — 99212 OFFICE O/P EST SF 10 MIN: CPT | Performed by: PHYSICIAN ASSISTANT

## 2022-06-24 NOTE — PROGRESS NOTES
[facility]    NAME: Rosaura Hughes is a 59 y o  male  : 1960    MRN: [id number]  DATE: 2022  TIME: 9:09 AM    Assessment and Plan   Subungual hematoma of fifth toe of left foot, initial encounter [S90 222A]  1  Subungual hematoma of fifth toe of left foot, initial encounter         Patient Instructions   Patient Instructions   Reassure  Continue warm Epsom salt soaks twice a day for the next 3-7 days  No topical antibiotic indicated at this time  Follow-up as needed  Chief Complaint     Chief Complaint   Patient presents with    Toe Pain     Patient with left great toe nail is black and having brown pus coming from under the nail bed       History of Present Illness   Rosaura Hughes presents to the clinic c/o  60 yo male comes in with discoloration and drainage L  Great toe  Started with discoloration of nail over a month ago  Does not recall any specific injury however 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  May have dropped something on it  Now the toenail is more white and he is getting a brown drainage out of it  He started to do an Epsom salt soak and then he has used some topical antibiotic  No pain  Review of Systems   Review of Systems   Constitutional: Negative  Musculoskeletal: Negative for gait problem  Skin: Positive for color change          Color change in drainage left great toenail area       Current Medications     Long-Term Medications   Medication Sig Dispense Refill    ibuprofen (MOTRIN) 200 mg tablet Take by mouth every 6 (six) hours as needed for mild pain      zolpidem (AMBIEN CR) 12 5 MG CR tablet Take 1 tablet (12 5 mg total) by mouth daily at bedtime as needed for sleep 30 tablet 5       Current Allergies     Allergies as of 2022    (No Known Allergies)          The following portions of the patient's history were reviewed and updated as appropriate: allergies, current medications, past family history, past medical history, past social history, past surgical history and problem list   Past Medical History:   Diagnosis Date    Difficulty sleeping     GERD (gastroesophageal reflux disease)     Impotence, organic     Insomnia      Past Surgical History:   Procedure Laterality Date    ADENOIDECTOMY      BREAST SURGERY      lumpectomy    TONSILLECTOMY       Family History   Problem Relation Age of Onset    Lung cancer Mother     Heart disease Father     Colon cancer Father        Objective   /76   Pulse 81   Temp (!) 97 2 °F (36 2 °C) (Tympanic)   Resp 18   SpO2 98%   No LMP for male patient  Physical Exam     Physical Exam  Vitals and nursing note reviewed  Constitutional:       General: He is not in acute distress  Appearance: Normal appearance  He is well-developed  He is not ill-appearing, toxic-appearing or diaphoretic  Cardiovascular:      Rate and Rhythm: Normal rate and regular rhythm  Pulmonary:      Effort: Pulmonary effort is normal    Skin:     General: Skin is warm and dry  Comments: Left great toenail appears to be  from nail bed  There is evidence of old subungual hematoma  No purulent drainage  No redness or TTP  Neurological:      Mental Status: He is alert and oriented to person, place, and time

## 2022-06-24 NOTE — PATIENT INSTRUCTIONS
Reassure  Continue warm Epsom salt soaks twice a day for the next 3-7 days  No topical antibiotic indicated at this time  Follow-up as needed

## 2022-10-10 ENCOUNTER — TRANSITIONAL CARE MANAGEMENT (OUTPATIENT)
Dept: FAMILY MEDICINE CLINIC | Facility: CLINIC | Age: 62
End: 2022-10-10

## 2022-10-11 ENCOUNTER — APPOINTMENT (OUTPATIENT)
Dept: LAB | Facility: CLINIC | Age: 62
End: 2022-10-11
Payer: COMMERCIAL

## 2022-10-11 ENCOUNTER — OFFICE VISIT (OUTPATIENT)
Dept: FAMILY MEDICINE CLINIC | Facility: CLINIC | Age: 62
End: 2022-10-11
Payer: COMMERCIAL

## 2022-10-11 VITALS
DIASTOLIC BLOOD PRESSURE: 62 MMHG | BODY MASS INDEX: 25.86 KG/M2 | WEIGHT: 208 LBS | HEART RATE: 80 BPM | SYSTOLIC BLOOD PRESSURE: 98 MMHG | RESPIRATION RATE: 16 BRPM | HEIGHT: 75 IN

## 2022-10-11 DIAGNOSIS — D50.0 BLOOD LOSS ANEMIA: ICD-10-CM

## 2022-10-11 DIAGNOSIS — K92.1 MELENA: ICD-10-CM

## 2022-10-11 DIAGNOSIS — K25.9 STOMACH ULCER DUE TO NONSTEROIDAL ANTI-INFLAMMATORY DRUG (NSAID): ICD-10-CM

## 2022-10-11 DIAGNOSIS — K29.01 GASTROINTESTINAL HEMORRHAGE ASSOCIATED WITH ACUTE GASTRITIS: ICD-10-CM

## 2022-10-11 DIAGNOSIS — M17.12 PRIMARY OSTEOARTHRITIS OF LEFT KNEE: ICD-10-CM

## 2022-10-11 DIAGNOSIS — T39.395A STOMACH ULCER DUE TO NONSTEROIDAL ANTI-INFLAMMATORY DRUG (NSAID): ICD-10-CM

## 2022-10-11 DIAGNOSIS — K29.01 GASTROINTESTINAL HEMORRHAGE ASSOCIATED WITH ACUTE GASTRITIS: Primary | ICD-10-CM

## 2022-10-11 PROBLEM — K92.2 GI BLEEDING: Status: ACTIVE | Noted: 2022-10-07

## 2022-10-11 LAB
ERYTHROCYTE [DISTWIDTH] IN BLOOD BY AUTOMATED COUNT: 13.1 % (ref 11.6–15.1)
HCT VFR BLD AUTO: 28.8 % (ref 36.5–49.3)
HGB BLD-MCNC: 9.4 G/DL (ref 12–17)
MCH RBC QN AUTO: 29.2 PG (ref 26.8–34.3)
MCHC RBC AUTO-ENTMCNC: 32.6 G/DL (ref 31.4–37.4)
MCV RBC AUTO: 89 FL (ref 82–98)
PLATELET # BLD AUTO: 216 THOUSANDS/UL (ref 149–390)
PMV BLD AUTO: 10.7 FL (ref 8.9–12.7)
RBC # BLD AUTO: 3.22 MILLION/UL (ref 3.88–5.62)
WBC # BLD AUTO: 4.2 THOUSAND/UL (ref 4.31–10.16)

## 2022-10-11 PROCEDURE — 85027 COMPLETE CBC AUTOMATED: CPT

## 2022-10-11 PROCEDURE — 36415 COLL VENOUS BLD VENIPUNCTURE: CPT

## 2022-10-11 PROCEDURE — 99496 TRANSJ CARE MGMT HIGH F2F 7D: CPT | Performed by: FAMILY MEDICINE

## 2022-10-11 RX ORDER — PANTOPRAZOLE SODIUM 40 MG/1
1 TABLET, DELAYED RELEASE ORAL
COMMUNITY
Start: 2022-10-09 | End: 2023-05-18 | Stop reason: ALTCHOICE

## 2022-10-11 NOTE — PATIENT INSTRUCTIONS
Continue present therapy  Complete CBC today   Follow-up Gastroenterology, Dr Amador Gillis  If symptoms reoccur please call Gastroenterology report to ER   Follow with Dr Hay Brought in 4 weeks

## 2022-10-11 NOTE — PROGRESS NOTES
Assessment & Plan     1  GI bleed  2  Stomach ulcer secondary to NSAID use  3  Blood loss anemia   4  Melena  Patient is at [facility] from October 7th to 9th   Status post EGD   To follow-up Gastroenterology will refer back to his gastroenterologist, Dr Xin Kohli  5  DJD of knee, patient not to use any NSAIDs  May use Tylenol  5  Healthcare maintenance, patient has refused influenza vaccine  6  Will have patient follow-up with Dr Kenia Saucedo in the next 4 weeks      1  Gastrointestinal hemorrhage associated with acute gastritis  -     CBC; Future; Expected date: 10/11/2022  -     Ambulatory Referral to Gastroenterology; Future    2  Stomach ulcer due to nonsteroidal anti-inflammatory drug (NSAID)  -     CBC; Future; Expected date: 10/11/2022  -     Ambulatory Referral to Gastroenterology; Future    3  Blood loss anemia  -     CBC; Future; Expected date: 10/11/2022  -     Ambulatory Referral to Gastroenterology; Future    4  Melena  -     CBC; Future; Expected date: 10/11/2022  -     Ambulatory Referral to Gastroenterology; Future    5  Primary osteoarthritis of left knee  Assessment & Plan:  Patient is not to use NSAIDs patient may use Tylenol           Subjective     Transitional Care Management Review:   Omar Eaton is a 57 y o  male here for TCM follow up  During the TCM phone call patient stated:  TCM Call     Date and time call was made  10/10/2022 10:34 AM    Hospital care reviewed  Records reviewed    Patient was hospitialized at  [facility]    Date of Admission  10/07/22    Date of discharge  10/09/22    Diagnosis  GI blee, dizziness    Disposition  Home    Were the patients medications reviewed and updated  No    Current Symptoms  None      TCM Call     Post hospital issues  None    Should patient be enrolled in anticoag monitoring? No    Scheduled for follow up?   Yes    Did you obtain your prescribed medications  Yes    Do you need help managing your prescriptions or medications  No    Is transportation to your appointment needed  No    I have advised the patient to call PCP with any new or worsening symptoms  Thaddeus Hernandez, Practice Administrator        Patient was in [facility] from October 7th until the 9th  He was diagnosed with GI bleed, blood-loss anemia, still having occasional melena but much improved  Patient is on twice a day Protonix  Osteoarthritis of knee  Patient instructed not use ibuprofen he may use Tylenol  Patient has yet to see Gastroenterology from discharge  Patient's hemoglobin dropped in-hospital from 10 3 down to 9 1    Review of Systems   Constitutional: Negative  HENT: Negative  Eyes: Negative  Respiratory: Negative  Cardiovascular: Negative  Gastrointestinal:        HPI   Endocrine: Negative  Genitourinary: Negative  Musculoskeletal:        HPI   Skin: Negative  Allergic/Immunologic: Negative  Neurological: Negative  Hematological:        HPI   Psychiatric/Behavioral: Negative  Objective     BP 98/62   Pulse 80   Resp 16   Ht 6' 3" (1 905 m)   Wt 94 3 kg (208 lb)   BMI 26 00 kg/m²      Physical Exam  Vitals and nursing note reviewed  Constitutional:       Appearance: Normal appearance  HENT:      Head: Normocephalic and atraumatic  Mouth/Throat:      Mouth: Mucous membranes are moist    Eyes:      General: No scleral icterus  Cardiovascular:      Rate and Rhythm: Normal rate and regular rhythm  Heart sounds: Normal heart sounds  Pulmonary:      Effort: Pulmonary effort is normal       Breath sounds: Normal breath sounds  Abdominal:      General: Bowel sounds are normal       Palpations: Abdomen is soft  Tenderness: There is no abdominal tenderness  Musculoskeletal:      Cervical back: Neck supple  No rigidity  Right lower leg: No edema  Left lower leg: No edema  Skin:     General: Skin is warm and dry     Neurological:      General: No focal deficit present  Mental Status: He is alert     Psychiatric:         Mood and Affect: Mood normal        Devaughn Dodd DO

## 2022-10-12 ENCOUNTER — TELEPHONE (OUTPATIENT)
Dept: FAMILY MEDICINE CLINIC | Facility: CLINIC | Age: 62
End: 2022-10-12

## 2022-10-12 NOTE — TELEPHONE ENCOUNTER
Patient states he has been SOB and lightheaded all night while at work  He is not sure what to do  Per Dr Segundo Mclain he should go to the ER  Patient agreed

## 2022-10-13 ENCOUNTER — TELEPHONE (OUTPATIENT)
Dept: FAMILY MEDICINE CLINIC | Facility: CLINIC | Age: 62
End: 2022-10-13

## 2022-10-13 NOTE — TELEPHONE ENCOUNTER
Patient called in and state that he recently went to the ER at [facility]   Patient stated that they informed him to inform his PCP of getting a new hemoglobin test ordered within 1-2 days of his discharge, please advise patient on steps moving forward   Thank you

## 2022-10-14 ENCOUNTER — APPOINTMENT (OUTPATIENT)
Dept: LAB | Facility: CLINIC | Age: 62
End: 2022-10-14
Payer: COMMERCIAL

## 2022-10-14 ENCOUNTER — TRANSITIONAL CARE MANAGEMENT (OUTPATIENT)
Dept: FAMILY MEDICINE CLINIC | Facility: CLINIC | Age: 62
End: 2022-10-14

## 2022-10-14 DIAGNOSIS — K25.9 STOMACH ULCER DUE TO NONSTEROIDAL ANTI-INFLAMMATORY DRUG (NSAID): ICD-10-CM

## 2022-10-14 DIAGNOSIS — K29.01 GASTROINTESTINAL HEMORRHAGE ASSOCIATED WITH ACUTE GASTRITIS: Primary | ICD-10-CM

## 2022-10-14 DIAGNOSIS — T39.395A STOMACH ULCER DUE TO NONSTEROIDAL ANTI-INFLAMMATORY DRUG (NSAID): ICD-10-CM

## 2022-10-14 DIAGNOSIS — Z13.1 SCREENING FOR DIABETES MELLITUS: ICD-10-CM

## 2022-10-14 DIAGNOSIS — D50.0 BLOOD LOSS ANEMIA: ICD-10-CM

## 2022-10-14 DIAGNOSIS — K92.1 MELENA: ICD-10-CM

## 2022-10-14 LAB
ANION GAP SERPL CALCULATED.3IONS-SCNC: 6 MMOL/L (ref 4–13)
BUN SERPL-MCNC: 20 MG/DL (ref 5–25)
CALCIUM SERPL-MCNC: 8.7 MG/DL (ref 8.3–10.1)
CHLORIDE SERPL-SCNC: 109 MMOL/L (ref 96–108)
CO2 SERPL-SCNC: 25 MMOL/L (ref 21–32)
CREAT SERPL-MCNC: 1.39 MG/DL (ref 0.6–1.3)
GFR SERPL CREATININE-BSD FRML MDRD: 53 ML/MIN/1.73SQ M
GLUCOSE P FAST SERPL-MCNC: 93 MG/DL (ref 65–99)
POTASSIUM SERPL-SCNC: 4.3 MMOL/L (ref 3.5–5.3)
SODIUM SERPL-SCNC: 140 MMOL/L (ref 135–147)

## 2022-10-14 PROCEDURE — 36415 COLL VENOUS BLD VENIPUNCTURE: CPT

## 2022-10-14 PROCEDURE — 80048 BASIC METABOLIC PNL TOTAL CA: CPT

## 2022-10-14 NOTE — TELEPHONE ENCOUNTER
Patient advises he already seen Dr Claudene Bosch for his TCM and his hemoglobin blood work was completed this morning

## 2022-11-11 ENCOUNTER — OFFICE VISIT (OUTPATIENT)
Dept: FAMILY MEDICINE CLINIC | Facility: CLINIC | Age: 62
End: 2022-11-11

## 2022-11-11 VITALS
SYSTOLIC BLOOD PRESSURE: 128 MMHG | HEIGHT: 75 IN | DIASTOLIC BLOOD PRESSURE: 74 MMHG | BODY MASS INDEX: 26.79 KG/M2 | OXYGEN SATURATION: 98 % | WEIGHT: 215.5 LBS | HEART RATE: 68 BPM | TEMPERATURE: 97.6 F

## 2022-11-11 DIAGNOSIS — G47.00 INSOMNIA, UNSPECIFIED TYPE: ICD-10-CM

## 2022-11-11 DIAGNOSIS — Z12.5 SCREENING FOR PROSTATE CANCER: ICD-10-CM

## 2022-11-11 DIAGNOSIS — R07.89 OTHER CHEST PAIN: ICD-10-CM

## 2022-11-11 DIAGNOSIS — L98.9 SKIN LESION OF LEFT ARM: ICD-10-CM

## 2022-11-11 DIAGNOSIS — K25.9 STOMACH ULCER DUE TO NONSTEROIDAL ANTI-INFLAMMATORY DRUG (NSAID): Primary | ICD-10-CM

## 2022-11-11 DIAGNOSIS — Z12.11 SCREEN FOR COLON CANCER: ICD-10-CM

## 2022-11-11 DIAGNOSIS — R60.0 LOCALIZED EDEMA: ICD-10-CM

## 2022-11-11 DIAGNOSIS — T39.395A STOMACH ULCER DUE TO NONSTEROIDAL ANTI-INFLAMMATORY DRUG (NSAID): Primary | ICD-10-CM

## 2022-11-11 PROBLEM — D50.0 BLOOD LOSS ANEMIA: Status: RESOLVED | Noted: 2022-10-07 | Resolved: 2022-11-11

## 2022-11-11 PROBLEM — K92.2 GI BLEEDING: Status: RESOLVED | Noted: 2022-10-07 | Resolved: 2022-11-11

## 2022-11-11 RX ORDER — ZOLPIDEM TARTRATE 12.5 MG/1
12.5 TABLET, FILM COATED, EXTENDED RELEASE ORAL
Qty: 30 TABLET | Refills: 5 | Status: SHIPPED | OUTPATIENT
Start: 2022-11-11

## 2022-11-11 NOTE — PROGRESS NOTES
Name: Marisabel Baugh      : [id number]      MRN: [id number]  Encounter Provider: Patti Grover MD  Encounter Date: 2022   Encounter department: [facility] PRIMARY CARE    Assessment & Plan     1  Stomach ulcer due to nonsteroidal anti-inflammatory drug (NSAID)  Assessment & Plan:  Await  F/u lab    Orders:  -     Comprehensive metabolic panel; Future    2  Insomnia, unspecified type  -     zolpidem (AMBIEN CR) 12 5 MG CR tablet; Take 1 tablet (12 5 mg total) by mouth daily at bedtime as needed for sleep    3  Other chest pain  -     Comprehensive metabolic panel; Future  -     POCT ECG    4  Localized edema  -     TSH, 3rd generation; Future  -     UA (URINE) with reflex to Scope    5  Screening for prostate cancer  -     PSA, Total Screen; Future    6  Skin lesion of left arm  -     Ambulatory Referral to Dermatology; Future           Subjective      F/u GI bleed due to NSAIDs, on Protonix, needs f/u lab, needs refill  On Ambien, occ feels lightheaded-would like sugar  Checked, occ chest pain-random but  Does   heavy lifting  At  Work,doesn't  Feel like heartburn, irregular  Area of skin  Over tattoo on l arm, also edema in legs  Since hospital stay, no leg pain    Review of Systems   Constitutional: Negative for activity change, appetite change and fatigue  Respiratory: Negative for shortness of breath  Cardiovascular: Positive for chest pain and leg swelling  Negative for palpitations  Skin: Positive for color change  Hypopigmented ,raised area  l  Arm in middle of tattoo   Neurological: Negative for dizziness and headaches         Current Outpatient Medications on File Prior to Visit   Medication Sig   • Acetaminophen (Tylenol) 325 MG CAPS Take by mouth as needed   • pantoprazole (PROTONIX) 40 mg tablet Take 1 tablet by mouth 2 (two) times a day before meals   • [DISCONTINUED] zolpidem (AMBIEN CR) 12 5 MG CR tablet Take 1 tablet (12 5 mg total) by mouth daily at bedtime as needed for sleep       Objective     /74 (BP Location: Right arm, Patient Position: Sitting, Cuff Size: Standard)   Pulse 68   Temp 97 6 °F (36 4 °C) (Temporal)   Ht 6' 3" (1 905 m)   Wt 97 8 kg (215 lb 8 oz)   SpO2 98%   BMI 26 94 kg/m²     Physical Exam  Vitals reviewed  Constitutional:       Appearance: Normal appearance  Cardiovascular:      Rate and Rhythm: Normal rate and regular rhythm  Pulses: Normal pulses  Heart sounds: Normal heart sounds  Pulmonary:      Effort: Pulmonary effort is normal       Breath sounds: Normal breath sounds  Musculoskeletal:      Right lower leg: Edema present  Left lower leg: Edema present  Comments:  1 plus edema   Lymphadenopathy:      Cervical: No cervical adenopathy  Neurological:      Mental Status: He is alert     Psychiatric:         Mood and Affect: Mood normal        Deja Villa MD

## 2022-11-14 ENCOUNTER — APPOINTMENT (OUTPATIENT)
Dept: LAB | Facility: CLINIC | Age: 62
End: 2022-11-14

## 2022-11-14 DIAGNOSIS — D50.0 BLOOD LOSS ANEMIA: ICD-10-CM

## 2022-11-14 DIAGNOSIS — R07.89 OTHER CHEST PAIN: ICD-10-CM

## 2022-11-14 DIAGNOSIS — T39.395A STOMACH ULCER DUE TO NONSTEROIDAL ANTI-INFLAMMATORY DRUG (NSAID): ICD-10-CM

## 2022-11-14 DIAGNOSIS — K92.1 MELENA: ICD-10-CM

## 2022-11-14 DIAGNOSIS — K25.9 STOMACH ULCER DUE TO NONSTEROIDAL ANTI-INFLAMMATORY DRUG (NSAID): ICD-10-CM

## 2022-11-14 DIAGNOSIS — R60.0 LOCALIZED EDEMA: ICD-10-CM

## 2022-11-14 DIAGNOSIS — K29.01 GASTROINTESTINAL HEMORRHAGE ASSOCIATED WITH ACUTE GASTRITIS: ICD-10-CM

## 2022-11-14 DIAGNOSIS — Z12.5 SCREENING FOR PROSTATE CANCER: ICD-10-CM

## 2022-11-14 LAB
ALBUMIN SERPL BCP-MCNC: 3.3 G/DL (ref 3.5–5)
ALP SERPL-CCNC: 82 U/L (ref 46–116)
ALT SERPL W P-5'-P-CCNC: 17 U/L (ref 12–78)
ANION GAP SERPL CALCULATED.3IONS-SCNC: 7 MMOL/L (ref 4–13)
AST SERPL W P-5'-P-CCNC: 15 U/L (ref 5–45)
BASOPHILS # BLD AUTO: 0.05 THOUSANDS/ÂΜL (ref 0–0.1)
BASOPHILS NFR BLD AUTO: 1 % (ref 0–1)
BILIRUB SERPL-MCNC: 0.67 MG/DL (ref 0.2–1)
BILIRUB UR QL STRIP: NEGATIVE
BUN SERPL-MCNC: 16 MG/DL (ref 5–25)
CALCIUM ALBUM COR SERPL-MCNC: 9.3 MG/DL (ref 8.3–10.1)
CALCIUM SERPL-MCNC: 8.7 MG/DL (ref 8.3–10.1)
CHLORIDE SERPL-SCNC: 108 MMOL/L (ref 96–108)
CLARITY UR: CLEAR
CO2 SERPL-SCNC: 24 MMOL/L (ref 21–32)
COLOR UR: COLORLESS
CREAT SERPL-MCNC: 1.27 MG/DL (ref 0.6–1.3)
EOSINOPHIL # BLD AUTO: 0.79 THOUSAND/ÂΜL (ref 0–0.61)
EOSINOPHIL NFR BLD AUTO: 17 % (ref 0–6)
ERYTHROCYTE [DISTWIDTH] IN BLOOD BY AUTOMATED COUNT: 13 % (ref 11.6–15.1)
FERRITIN SERPL-MCNC: 10 NG/ML (ref 8–388)
GFR SERPL CREATININE-BSD FRML MDRD: 59 ML/MIN/1.73SQ M
GLUCOSE P FAST SERPL-MCNC: 105 MG/DL (ref 65–99)
GLUCOSE UR STRIP-MCNC: NEGATIVE MG/DL
HCT VFR BLD AUTO: 30.2 % (ref 36.5–49.3)
HGB BLD-MCNC: 9.1 G/DL (ref 12–17)
HGB UR QL STRIP.AUTO: NEGATIVE
IMM GRANULOCYTES # BLD AUTO: 0.01 THOUSAND/UL (ref 0–0.2)
IMM GRANULOCYTES NFR BLD AUTO: 0 % (ref 0–2)
IRON SATN MFR SERPL: 6 % (ref 20–50)
IRON SERPL-MCNC: 25 UG/DL (ref 65–175)
KETONES UR STRIP-MCNC: NEGATIVE MG/DL
LEUKOCYTE ESTERASE UR QL STRIP: NEGATIVE
LYMPHOCYTES # BLD AUTO: 1.07 THOUSANDS/ÂΜL (ref 0.6–4.47)
LYMPHOCYTES NFR BLD AUTO: 23 % (ref 14–44)
MCH RBC QN AUTO: 25.9 PG (ref 26.8–34.3)
MCHC RBC AUTO-ENTMCNC: 30.1 G/DL (ref 31.4–37.4)
MCV RBC AUTO: 86 FL (ref 82–98)
MONOCYTES # BLD AUTO: 0.56 THOUSAND/ÂΜL (ref 0.17–1.22)
MONOCYTES NFR BLD AUTO: 12 % (ref 4–12)
NEUTROPHILS # BLD AUTO: 2.28 THOUSANDS/ÂΜL (ref 1.85–7.62)
NEUTS SEG NFR BLD AUTO: 47 % (ref 43–75)
NITRITE UR QL STRIP: NEGATIVE
NRBC BLD AUTO-RTO: 0 /100 WBCS
PH UR STRIP.AUTO: 6 [PH]
PLATELET # BLD AUTO: 254 THOUSANDS/UL (ref 149–390)
PMV BLD AUTO: 10.4 FL (ref 8.9–12.7)
POTASSIUM SERPL-SCNC: 4.1 MMOL/L (ref 3.5–5.3)
PROT SERPL-MCNC: 7.2 G/DL (ref 6.4–8.4)
PROT UR STRIP-MCNC: NEGATIVE MG/DL
PSA SERPL-MCNC: 1.9 NG/ML (ref 0–4)
RBC # BLD AUTO: 3.52 MILLION/UL (ref 3.88–5.62)
SODIUM SERPL-SCNC: 139 MMOL/L (ref 135–147)
SP GR UR STRIP.AUTO: 1.01 (ref 1–1.03)
TIBC SERPL-MCNC: 421 UG/DL (ref 250–450)
TSH SERPL DL<=0.05 MIU/L-ACNC: 2.66 UIU/ML (ref 0.45–4.5)
UROBILINOGEN UR STRIP-ACNC: <2 MG/DL
WBC # BLD AUTO: 4.76 THOUSAND/UL (ref 4.31–10.16)

## 2022-11-29 ENCOUNTER — OFFICE VISIT (OUTPATIENT)
Dept: URGENT CARE | Facility: CLINIC | Age: 62
End: 2022-11-29

## 2022-11-29 VITALS
HEART RATE: 90 BPM | SYSTOLIC BLOOD PRESSURE: 122 MMHG | DIASTOLIC BLOOD PRESSURE: 68 MMHG | HEIGHT: 75 IN | WEIGHT: 218 LBS | BODY MASS INDEX: 27.1 KG/M2 | RESPIRATION RATE: 18 BRPM | TEMPERATURE: 100.7 F | OXYGEN SATURATION: 100 %

## 2022-11-29 DIAGNOSIS — J11.1 INFLUENZA-LIKE ILLNESS: ICD-10-CM

## 2022-11-29 DIAGNOSIS — R05.1 ACUTE COUGH: Primary | ICD-10-CM

## 2022-11-29 LAB
SARS-COV-2 AG UPPER RESP QL IA: NEGATIVE
VALID CONTROL: NORMAL

## 2022-11-29 RX ORDER — OSELTAMIVIR PHOSPHATE 75 MG/1
75 CAPSULE ORAL EVERY 12 HOURS SCHEDULED
Qty: 10 CAPSULE | Refills: 0 | Status: SHIPPED | OUTPATIENT
Start: 2022-11-29 | End: 2022-12-04

## 2022-11-29 RX ORDER — DEXTROMETHORPHAN HYDROBROMIDE AND PROMETHAZINE HYDROCHLORIDE 15; 6.25 MG/5ML; MG/5ML
SOLUTION ORAL
Qty: 118 ML | Refills: 0 | Status: SHIPPED | OUTPATIENT
Start: 2022-11-29

## 2022-11-29 NOTE — LETTER
November 29, 2022     Patient: Mitchell Blanchard   YOB: 1960   Date of Visit: 11/29/2022       To Whom It May Concern:    Patient was seen in office today for acute medical ailment  Rapid COVID test negative  COVID/influenza testing initiated  Return to work / school based on test results  Pending test results, patient should not return to any normal activities until without fever for 24 hours without having to take anti fever medication           Sincerely,        Dennis Gautam PA-C    CC: No Recipients

## 2022-11-29 NOTE — PROGRESS NOTES
[facility]    NAME: Nohemi Forbes is a 57 y o  male  : 1960    MRN: [id number]  DATE: 2022  TIME: 10:44 AM    Assessment and Plan   Acute cough [R05 1]  1  Acute cough  Poct Covid 19 Rapid Antigen Test    Cov/Flu-Collected at [facility]      2  Influenza-like illness  oseltamivir (TAMIFLU) 75 mg capsule    Promethazine-DM (PHENERGAN-DM) 6 25-15 mg/5 mL oral syrup          Patient Instructions   Patient Instructions   Rapid COVID test negative  Testing initiated for COVID and influenza  Those results take approximately 24-48 hours to return  You may access those test results on your [facility]'s my chart account  If treated with Tamiflu for influenza and influenza test is negative  You may discontinue Tamiflu  If COVID test is positive, please notify your primary care provider as soon as possible to inform a positive test results  This appears to be viral illness and no antibiotic is indicated at this time  Strongly encourage getting plenty of rest over the next few days  Increase your hydration  Vaporizer by bedside may also be helpful  If you are having sinus pressure, nasal congestion, runny nose, and / or post nasal drip you may try the following to help ease your symptoms:            *Clearing your sinuses in a nice steamy shower may be helpful, especially first thing after waking  *Nasal saline rinses every 1-2 hours while awake may also help decrease nasal congestion, drainage  *Afrin nasal spray if significant nasal congestion at bedtime may use   (Do not use for over 3 days however )       *Decongestant / expectorant such as Mucinex D 12 hour 1/2 to 1 tablet as needed with full glass of fluids may help decrease pressure and drainage  If you are having difficulty with ear pressure, discomfort:     Decongestant may be helpful  Flonase nasal spray  Warm compresses against ear(s) for comfort        Although bothersome, mucous is not necessarily a bad thing  Production of mucous is the body's way of trying to capture and flush irritants from mucosal surfaces  Yellow or green mucous does not necessarily mean you have a bacterial infection  Mucous will become more discolored over time, especially first thing in the morning, as your body's immune system  floods the mucosal surfaces with white bloods cells to try and help fight  infection  This white blood cell debride can also cause mucous to be discolored  Again, using nasal saline spray frequently may help soothe and keep mucous flowing out versus getting dried, thickened and / or stuck leading to more sinus pain and pressure  If you have a cough, please realize that a cough is not necessarily a bad thing but a way that your body may be trying to keep your airways clear  Phlegm may be more discolored in the morning  Please note that discolored phlegm does not necessarily mean a bacterial infection  The following things may help with your cough:         *Warm tea with honey or a teaspoon of honey periodically throughout day and / or before bed  *You may also use plain Mucinex (an expectorant to help keep mucus thin so you can clear it easier) or Mucinex DM (expectorant / cough suyppressant) to help decrease cough if it is bothering your sleep  An expectorant works best if you take with full glass of fluids  Other night time cough medication options include Delsym, Robitussin DM, NyQuil  *Propping with an extra pillow or two may be helpful  *Keep water by your bedside to sip on as needed  *Cough drops  If you are having any sore throat or hoarseness,  you may do warm salt water gargles every 1-2 hours while awake, throat lozenges, Tylenol, voice rest, warm tea with honey  Most upper respiratory symptoms start to improve after 7-10 days but may take a few weeks to completely resolve    Mucus may be more discolored first thing in the morning  Discolored mucous does not necessarily mean bacterial infection but can be dehydrated mucous or mucous filled with white blood cell debride that have been helping you to fight your illness  Follow up with your PCP office if not improving over next 10 days  If significant weakness, chest pain, shortness of breath proceed to ER for immediate further evaluation  Transmission precautions advised  Chief Complaint     Chief Complaint   Patient presents with   • Cold Like Symptoms     Starting Sunday night pt began to develop sore throat and nonproductive cough  Yesterday also experienced fever and chills (highest temp yesterday 101)  Has not taken any meds this AM  Has not tested for COVID  History of Present Illness   Byron Santana presents to the clinic c/o  75-year-old male comes in with cough and sore throat  Started:  Sunday night with nonproductive cough and sore throat  Associated signs & symptoms:  Yesterday noted fever and chills with T-max 101°  No runny nose or nasal congestion  No postnasal drip  Modifying factors:  Has not done anything for his symptoms  No influenza or COVID vaccine history  No known exposures  Nonsmoker and no history of asthma or pneumonia  Wife smokes outside  Review of Systems   Review of Systems   Constitutional: Positive for activity change, appetite change, chills, diaphoresis, fatigue and fever  Negative for unexpected weight change  HENT: Positive for congestion, postnasal drip, rhinorrhea, sore throat and trouble swallowing  Negative for ear discharge, ear pain, facial swelling, hearing loss, sinus pressure, sinus pain and voice change  Eyes: Negative for photophobia, pain, discharge, redness, itching and visual disturbance  Respiratory: Positive for cough  Negative for apnea, choking, chest tightness, shortness of breath, wheezing and stridor  Cardiovascular: Negative  Gastrointestinal: Negative  Musculoskeletal: Positive for back pain  Negative for myalgias  Skin: Negative for rash  Hematological: Negative for adenopathy  Current Medications     Long-Term Medications   Medication Sig Dispense Refill   • zolpidem (AMBIEN CR) 12 5 MG CR tablet Take 1 tablet (12 5 mg total) by mouth daily at bedtime as needed for sleep 30 tablet 5       Current Allergies     Allergies as of 11/29/2022   • (No Known Allergies)          The following portions of the patient's history were reviewed and updated as appropriate: allergies, current medications, past family history, past medical history, past social history, past surgical history and problem list   Past Medical History:   Diagnosis Date   • Difficulty sleeping    • GERD (gastroesophageal reflux disease)    • Impotence, organic    • Insomnia      Past Surgical History:   Procedure Laterality Date   • ADENOIDECTOMY     • BREAST SURGERY      lumpectomy   • TONSILLECTOMY       Family History   Problem Relation Age of Onset   • Lung cancer Mother    • Heart disease Father    • Colon cancer Father        Objective   /68 (BP Location: Left arm, Patient Position: Sitting, Cuff Size: Large)   Pulse 90   Temp (!) 100 7 °F (38 2 °C) (Tympanic)   Resp 18   Ht 6' 3" (1 905 m)   Wt 98 9 kg (218 lb)   SpO2 100%   BMI 27 25 kg/m²   No LMP for male patient  Physical Exam     Physical Exam  Vitals and nursing note reviewed  Constitutional:       General: He is not in acute distress  Appearance: He is well-developed  He is ill-appearing  He is not toxic-appearing or diaphoretic  Comments: No trismus or conversational dyspnea  Appears mildly ill but in no acute distress  HENT:      Head: Normocephalic and atraumatic  Right Ear: Tympanic membrane, ear canal and external ear normal       Left Ear: Tympanic membrane, ear canal and external ear normal       Nose: No congestion or rhinorrhea        Mouth/Throat:      Mouth: Mucous membranes are moist       Pharynx: No oropharyngeal exudate or posterior oropharyngeal erythema  Comments: Cobblestoning posterior pharynx with patchy redness  Eyes:      General: No scleral icterus  Right eye: No discharge  Left eye: No discharge  Conjunctiva/sclera: Conjunctivae normal       Pupils: Pupils are equal, round, and reactive to light  Neck:      Trachea: No tracheal deviation  Cardiovascular:      Rate and Rhythm: Normal rate and regular rhythm  Heart sounds: Normal heart sounds  No murmur heard  No friction rub  No gallop  Pulmonary:      Effort: Pulmonary effort is normal  No respiratory distress  Breath sounds: Normal breath sounds  No stridor  No wheezing, rhonchi or rales  Musculoskeletal:      Cervical back: Normal range of motion and neck supple  No rigidity or tenderness  Lymphadenopathy:      Cervical: No cervical adenopathy  Skin:     General: Skin is warm and dry  Findings: No rash  Comments: No acute rashes   Neurological:      Mental Status: He is alert and oriented to person, place, and time     Psychiatric:         Mood and Affect: Mood normal          Behavior: Behavior normal

## 2022-11-29 NOTE — PATIENT INSTRUCTIONS
Rapid COVID test negative  Testing initiated for COVID and influenza  Those results take approximately 24-48 hours to return  You may access those test results on your [facility]'s my chart account  If treated with Tamiflu for influenza and influenza test is negative  You may discontinue Tamiflu  If COVID test is positive, please notify your primary care provider as soon as possible to inform a positive test results  This appears to be viral illness and no antibiotic is indicated at this time  Strongly encourage getting plenty of rest over the next few days  Increase your hydration  Vaporizer by bedside may also be helpful  If you are having sinus pressure, nasal congestion, runny nose, and / or post nasal drip you may try the following to help ease your symptoms:            *Clearing your sinuses in a nice steamy shower may be helpful, especially first thing after waking  *Nasal saline rinses every 1-2 hours while awake may also help decrease nasal congestion, drainage  *Afrin nasal spray if significant nasal congestion at bedtime may use   (Do not use for over 3 days however )       *Decongestant / expectorant such as Mucinex D 12 hour 1/2 to 1 tablet as needed with full glass of fluids may help decrease pressure and drainage  If you are having difficulty with ear pressure, discomfort:     Decongestant may be helpful  Flonase nasal spray  Warm compresses against ear(s) for comfort  Although bothersome, mucous is not necessarily a bad thing  Production of mucous is the body's way of trying to capture and flush irritants from mucosal surfaces  Yellow or green mucous does not necessarily mean you have a bacterial infection  Mucous will become more discolored over time, especially first thing in the morning, as your body's immune system  floods the mucosal surfaces with white bloods cells to try and help fight  infection    This white blood cell debride can also cause mucous to be discolored  Again, using nasal saline spray frequently may help soothe and keep mucous flowing out versus getting dried, thickened and / or stuck leading to more sinus pain and pressure  If you have a cough, please realize that a cough is not necessarily a bad thing but a way that your body may be trying to keep your airways clear  Phlegm may be more discolored in the morning  Please note that discolored phlegm does not necessarily mean a bacterial infection  The following things may help with your cough:         *Warm tea with honey or a teaspoon of honey periodically throughout day and / or before bed  *You may also use plain Mucinex (an expectorant to help keep mucus thin so you can clear it easier) or Mucinex DM (expectorant / cough suyppressant) to help decrease cough if it is bothering your sleep  An expectorant works best if you take with full glass of fluids  Other night time cough medication options include Delsym, Robitussin DM, NyQuil  *Propping with an extra pillow or two may be helpful  *Keep water by your bedside to sip on as needed  *Cough drops  If you are having any sore throat or hoarseness,  you may do warm salt water gargles every 1-2 hours while awake, throat lozenges, Tylenol, voice rest, warm tea with honey  Most upper respiratory symptoms start to improve after 7-10 days but may take a few weeks to completely resolve  Mucus may be more discolored first thing in the morning  Discolored mucous does not necessarily mean bacterial infection but can be dehydrated mucous or mucous filled with white blood cell debride that have been helping you to fight your illness  Follow up with your PCP office if not improving over next 10 days  If significant weakness, chest pain, shortness of breath proceed to ER for immediate further evaluation  Transmission precautions advised

## 2022-11-30 ENCOUNTER — TELEPHONE (OUTPATIENT)
Dept: URGENT CARE | Facility: CLINIC | Age: 62
End: 2022-11-30

## 2022-11-30 LAB
FLUAV RNA RESP QL NAA+PROBE: POSITIVE
FLUBV RNA RESP QL NAA+PROBE: NEGATIVE
SARS-COV-2 RNA RESP QL NAA+PROBE: NEGATIVE

## 2022-11-30 NOTE — TELEPHONE ENCOUNTER
Message left that flu test is positive  Continue Tamiflu  Follow-up with primary care if not improving over the next 7-10 days or go to the emergency room if severe worsening of symptoms

## 2023-02-08 ENCOUNTER — TELEPHONE (OUTPATIENT)
Dept: ADMINISTRATIVE | Facility: OTHER | Age: 63
End: 2023-02-08

## 2023-03-08 NOTE — TELEPHONE ENCOUNTER
Upon review of the In Basket request and the patient's chart, initial outreach has been made via fax, please see Contacts section for details  A second outreach attempt will be made within 3 business days      Thank you  Remberto Paula Danger to self

## 2023-05-18 ENCOUNTER — OFFICE VISIT (OUTPATIENT)
Dept: FAMILY MEDICINE CLINIC | Facility: CLINIC | Age: 63
End: 2023-05-18

## 2023-05-18 VITALS
WEIGHT: 219 LBS | HEART RATE: 74 BPM | DIASTOLIC BLOOD PRESSURE: 82 MMHG | TEMPERATURE: 97.8 F | BODY MASS INDEX: 27.23 KG/M2 | HEIGHT: 75 IN | SYSTOLIC BLOOD PRESSURE: 124 MMHG

## 2023-05-18 DIAGNOSIS — K25.9 STOMACH ULCER DUE TO NONSTEROIDAL ANTI-INFLAMMATORY DRUG (NSAID): ICD-10-CM

## 2023-05-18 DIAGNOSIS — J30.9 ALLERGIC RHINITIS, UNSPECIFIED SEASONALITY, UNSPECIFIED TRIGGER: ICD-10-CM

## 2023-05-18 DIAGNOSIS — Z12.5 SCREENING FOR PROSTATE CANCER: Primary | ICD-10-CM

## 2023-05-18 DIAGNOSIS — T39.395A STOMACH ULCER DUE TO NONSTEROIDAL ANTI-INFLAMMATORY DRUG (NSAID): ICD-10-CM

## 2023-05-18 DIAGNOSIS — Z13.1 SCREENING FOR DIABETES MELLITUS: ICD-10-CM

## 2023-05-18 DIAGNOSIS — G47.00 INSOMNIA, UNSPECIFIED TYPE: ICD-10-CM

## 2023-05-18 RX ORDER — ZOLPIDEM TARTRATE 12.5 MG/1
12.5 TABLET, FILM COATED, EXTENDED RELEASE ORAL
Qty: 30 TABLET | Refills: 5 | Status: SHIPPED | OUTPATIENT
Start: 2023-05-18

## 2023-05-18 NOTE — PROGRESS NOTES
Name: Umu Hernandez      : [id number]      MRN: [id number]  Encounter Provider: Sim Bob MD  Encounter Date: 2023   Encounter department: [facility] PRIMARY CARE    Assessment & Plan     1  Screening for prostate cancer  -     PSA, Total Screen; Future; Expected date: 2023    2  Insomnia, unspecified type  -     zolpidem (AMBIEN CR) 12 5 MG CR tablet; Take 1 tablet (12 5 mg total) by mouth daily at bedtime as needed for sleep    3  Screening for diabetes mellitus  -     Comprehensive metabolic panel; Future; Expected date: 2023    4  Stomach ulcer due to nonsteroidal anti-inflammatory drug (NSAID)  -     CBC and differential; Future; Expected date: 2023  -     Iron Panel (Includes Ferritin, Iron Sat%, Iron, and TIBC); Future    5  Allergic rhinitis, unspecified seasonality, unspecified trigger           Subjective      F/u insomnia  Needs  Refill on Ambien, concerns: Allergy  Sx-post nasal drip and sneezing, using  otc  Meds,no nasal spray, still feeling  More  Cold over last year-no consistent  Use  Of  vitamins    Review of Systems   Constitutional: Negative for activity change and chills  HENT: Positive for congestion and postnasal drip  Negative for sore throat  Respiratory: Negative for shortness of breath  Cardiovascular: Negative for chest pain  Endocrine: Positive for cold intolerance  Psychiatric/Behavioral: Positive for sleep disturbance  Negative for dysphoric mood         Current Outpatient Medications on File Prior to Visit   Medication Sig   • [DISCONTINUED] pantoprazole (PROTONIX) 40 mg tablet Take 1 tablet by mouth 2 (two) times a day before meals   • [DISCONTINUED] zolpidem (AMBIEN CR) 12 5 MG CR tablet Take 1 tablet (12 5 mg total) by mouth daily at bedtime as needed for sleep   • Acetaminophen (Tylenol) 325 MG CAPS Take by mouth as needed (Patient not taking: Reported on 2023)   • [DISCONTINUED] Promethazine-DM (PHENERGAN-DM) 6 25-15 mg/5 "mL oral syrup 5 mL q 6 hours or at hs prn cough  Home use only  Objective     /78 (BP Location: Left arm, Patient Position: Sitting, Cuff Size: Standard)   Pulse 74   Temp 97 8 °F (36 6 °C) (Temporal)   Ht 6' 3\" (1 905 m) Comment: on file  Wt 99 3 kg (219 lb)   BMI 27 37 kg/m²     Physical Exam  Vitals reviewed  Constitutional:       Appearance: Normal appearance  HENT:      Right Ear: Tympanic membrane normal       Left Ear: Tympanic membrane normal       Nose: No congestion or rhinorrhea  Mouth/Throat:      Pharynx: No oropharyngeal exudate or posterior oropharyngeal erythema  Cardiovascular:      Rate and Rhythm: Normal rate and regular rhythm  Pulses: Normal pulses  Heart sounds: Normal heart sounds  Pulmonary:      Effort: Pulmonary effort is normal       Breath sounds: Normal breath sounds  Musculoskeletal:      Right lower leg: No edema  Left lower leg: No edema  Lymphadenopathy:      Cervical: No cervical adenopathy  Neurological:      Mental Status: He is alert     Psychiatric:         Mood and Affect: Mood normal        Sonya Naqvi MD  "

## 2023-07-20 DIAGNOSIS — G47.00 INSOMNIA, UNSPECIFIED TYPE: ICD-10-CM

## 2023-07-20 RX ORDER — ZOLPIDEM TARTRATE 12.5 MG/1
12.5 TABLET, FILM COATED, EXTENDED RELEASE ORAL
Qty: 30 TABLET | Refills: 5 | Status: CANCELLED | OUTPATIENT
Start: 2023-07-20

## 2023-11-20 DIAGNOSIS — G47.00 INSOMNIA, UNSPECIFIED TYPE: ICD-10-CM

## 2023-11-20 RX ORDER — ZOLPIDEM TARTRATE 12.5 MG/1
12.5 TABLET, FILM COATED, EXTENDED RELEASE ORAL
Qty: 30 TABLET | Refills: 1 | Status: SHIPPED | OUTPATIENT
Start: 2023-11-20

## 2023-12-22 ENCOUNTER — OFFICE VISIT (OUTPATIENT)
Dept: URGENT CARE | Facility: CLINIC | Age: 63
End: 2023-12-22
Payer: COMMERCIAL

## 2023-12-22 VITALS
OXYGEN SATURATION: 98 % | HEART RATE: 79 BPM | TEMPERATURE: 97.3 F | DIASTOLIC BLOOD PRESSURE: 80 MMHG | WEIGHT: 211 LBS | RESPIRATION RATE: 18 BRPM | SYSTOLIC BLOOD PRESSURE: 110 MMHG | BODY MASS INDEX: 26.37 KG/M2

## 2023-12-22 DIAGNOSIS — A09 GASTROENTERITIS, INFECTIOUS: Primary | ICD-10-CM

## 2023-12-22 PROCEDURE — 99213 OFFICE O/P EST LOW 20 MIN: CPT | Performed by: PHYSICIAN ASSISTANT

## 2023-12-22 NOTE — PATIENT INSTRUCTIONS
1. Clear liquids for 12-24 hours     2. Then may advance to bland diet (ie. BRAT - bananas, applesauce, toast) as tolerated.     3. Recommend avoiding any milk products, spicy, greasy foods and citrus products over the next 1-2 weeks.  Advance diet as tolerated.    4. Transmission precautions advised.      5. If symptoms are not resolving over the next 2-3 days, seek further evaluation by your PCP.      6. If you symptoms worsen and you are unable to keep any food or liquid down or develop significant abdominal pain, proceed to ER for further evaluation and treatment.    7.  Transmission precautions advised.  If potential viral or bacterial infection, may be transmitted to other people.  Recommend good handwashing after using toilet and keeping any shared bathrooms / sinks / handles well cleaned.

## 2023-12-22 NOTE — PROGRESS NOTES
[facility]    NAME: Leonardo Melendez is a 63 y.o. male  : 1960    MRN: [id number]  DATE: 2023  TIME: 8:39 AM    Assessment and Plan   Gastroenteritis, infectious [A09]  1. Gastroenteritis, infectious            Patient Instructions     Patient Instructions   1. Clear liquids for 12-24 hours     2. Then may advance to bland diet (ie. BRAT - bananas, applesauce, toast) as tolerated.     3. Recommend avoiding any milk products, spicy, greasy foods and citrus products over the next 1-2 weeks.  Advance diet as tolerated.    4. Transmission precautions advised.      5. If symptoms are not resolving over the next 2-3 days, seek further evaluation by your PCP.      6. If you symptoms worsen and you are unable to keep any food or liquid down or develop significant abdominal pain, proceed to ER for further evaluation and treatment.    7.  Transmission precautions advised.  If potential viral or bacterial infection, may be transmitted to other people.  Recommend good handwashing after using toilet and keeping any shared bathrooms / sinks / handles well cleaned.       Chief Complaint     Chief Complaint   Patient presents with    Vomiting    Diarrhea     Pt reports waking up yesterday to a dry mouth and nauseous. Pt C/O vomiting and diarrhea up until 8pm last night.        History of Present Illness   Leonardo Melendez presents to the clinic c/o  63-year-old male comes in with nausea vomiting and diarrhea that started yesterday.    Associated signs and symptoms: Last emesis around midnight.  Diarrhea has been watery without blood.  Has felt a little feverish but no chills or diaphoresis.  No abdominal pain.  Modifying factors: Resting.  Fluids.  Known Exposures: No.  Recent travel:  No.      Vomiting   Associated symptoms include chills, diarrhea and dizziness. Pertinent negatives include no abdominal pain, fever or headaches.   Diarrhea   Associated symptoms include chills and vomiting. Pertinent  negatives include no abdominal pain, fever or headaches.       Review of Systems   Review of Systems   Constitutional:  Positive for activity change, appetite change, chills and fatigue. Negative for diaphoresis and fever.   HENT: Negative.     Eyes: Negative.    Respiratory: Negative.     Cardiovascular: Negative.    Gastrointestinal:  Positive for diarrhea, nausea and vomiting. Negative for abdominal distention, abdominal pain, anal bleeding, blood in stool, constipation and rectal pain.   Skin:  Negative for rash.   Neurological:  Positive for dizziness. Negative for headaches.       Current Medications     Long-Term Medications   Medication Sig Dispense Refill    zolpidem (AMBIEN CR) 12.5 MG CR tablet Take 1 tablet (12.5 mg total) by mouth daily at bedtime as needed for sleep 30 tablet 1       Current Allergies     Allergies as of 12/22/2023    (No Known Allergies)          The following portions of the patient's history were reviewed and updated as appropriate: allergies, current medications, past family history, past medical history, past social history, past surgical history and problem list.  Past Medical History:   Diagnosis Date    Difficulty sleeping     GERD (gastroesophageal reflux disease)     Impotence, organic     Insomnia      Past Surgical History:   Procedure Laterality Date    ADENOIDECTOMY      BREAST SURGERY      lumpectomy    TONSILLECTOMY       Family History   Problem Relation Age of Onset    Lung cancer Mother     Heart disease Father     Colon cancer Father        Objective   /80 (BP Location: Left arm, Patient Position: Sitting, Cuff Size: Standard)   Pulse 79   Temp (!) 97.3 °F (36.3 °C) (Tympanic)   Resp 18   Wt 95.7 kg (211 lb)   SpO2 98%   BMI 26.37 kg/m²   No LMP for male patient.       Physical Exam     Physical Exam  Vitals and nursing note reviewed.   Constitutional:       General: He is not in acute distress.     Appearance: Normal appearance. He is not ill-appearing,  toxic-appearing or diaphoretic.   HENT:      Head: Normocephalic and atraumatic.   Eyes:      General: No scleral icterus.     Extraocular Movements: Extraocular movements intact.      Conjunctiva/sclera: Conjunctivae normal.      Pupils: Pupils are equal, round, and reactive to light.   Cardiovascular:      Rate and Rhythm: Normal rate and regular rhythm.      Heart sounds: Normal heart sounds. No murmur heard.     No friction rub. No gallop.   Pulmonary:      Effort: Pulmonary effort is normal. No respiratory distress.      Breath sounds: Normal breath sounds. No stridor. No wheezing, rhonchi or rales.   Abdominal:      General: Bowel sounds are increased.      Palpations: Abdomen is soft. There is no hepatomegaly or splenomegaly.      Tenderness: There is no abdominal tenderness.   Musculoskeletal:      Cervical back: Normal range of motion and neck supple. No rigidity or tenderness.   Lymphadenopathy:      Cervical: No cervical adenopathy.   Skin:     General: Skin is warm and dry.      Coloration: Skin is not pale.      Comments: Good turgor   Neurological:      Mental Status: He is alert.   Psychiatric:         Mood and Affect: Mood normal.         Behavior: Behavior normal.

## 2023-12-22 NOTE — LETTER
December 22, 2023     Patient: Leonardo Melendez   YOB: 1960   Date of Visit: 12/22/2023       To Whom It May Concern:    Above patient seen in office today for acute medical ailment.  May  attempt return to work in next 1-3 days as tolerated.          Sincerely,        Sherry Michel PA-C    CC: No Recipients

## 2024-01-11 DIAGNOSIS — G47.00 INSOMNIA, UNSPECIFIED TYPE: ICD-10-CM

## 2024-01-11 RX ORDER — ZOLPIDEM TARTRATE 12.5 MG/1
12.5 TABLET, FILM COATED, EXTENDED RELEASE ORAL
Qty: 30 TABLET | Refills: 1 | OUTPATIENT
Start: 2024-01-11

## 2024-01-11 NOTE — TELEPHONE ENCOUNTER
Patient called in asking for refill on ambien as medication will run out on 1/26. His last office was on 5/18. If he needs to make an appt to discuss continuing medication with Dr. Abad, he can only make appts in the morning due to work. If medication can be filled without appt, please call patient back to inform.

## 2024-01-18 ENCOUNTER — OFFICE VISIT (OUTPATIENT)
Dept: FAMILY MEDICINE CLINIC | Facility: CLINIC | Age: 64
End: 2024-01-18
Payer: COMMERCIAL

## 2024-01-18 VITALS
DIASTOLIC BLOOD PRESSURE: 70 MMHG | WEIGHT: 215 LBS | BODY MASS INDEX: 26.73 KG/M2 | SYSTOLIC BLOOD PRESSURE: 150 MMHG | HEIGHT: 75 IN

## 2024-01-18 DIAGNOSIS — R03.0 ELEVATED BLOOD PRESSURE READING WITHOUT DIAGNOSIS OF HYPERTENSION: ICD-10-CM

## 2024-01-18 DIAGNOSIS — Z12.5 SCREENING FOR PROSTATE CANCER: ICD-10-CM

## 2024-01-18 DIAGNOSIS — Z13.1 SCREENING FOR DIABETES MELLITUS: ICD-10-CM

## 2024-01-18 DIAGNOSIS — T39.395A STOMACH ULCER DUE TO NONSTEROIDAL ANTI-INFLAMMATORY DRUG (NSAID): ICD-10-CM

## 2024-01-18 DIAGNOSIS — G47.00 INSOMNIA, UNSPECIFIED TYPE: Primary | ICD-10-CM

## 2024-01-18 DIAGNOSIS — K25.9 STOMACH ULCER DUE TO NONSTEROIDAL ANTI-INFLAMMATORY DRUG (NSAID): ICD-10-CM

## 2024-01-18 DIAGNOSIS — Z13.6 SCREENING FOR CARDIOVASCULAR CONDITION: ICD-10-CM

## 2024-01-18 DIAGNOSIS — I83.90 VARICOSE VEINS OF FOOT: ICD-10-CM

## 2024-01-18 PROCEDURE — 99213 OFFICE O/P EST LOW 20 MIN: CPT | Performed by: FAMILY MEDICINE

## 2024-01-18 RX ORDER — ZOLPIDEM TARTRATE 12.5 MG/1
12.5 TABLET, FILM COATED, EXTENDED RELEASE ORAL
Qty: 30 TABLET | Refills: 5 | Status: SHIPPED | OUTPATIENT
Start: 2024-01-18

## 2024-01-18 NOTE — PROGRESS NOTES
Name: Leonardo Melendez      : 1960      MRN: [id number]  Encounter Provider: Ashley Abad MD  Encounter Date: 2024   Encounter department: [facility] PRIMARY CARE    Assessment & Plan     1. Insomnia, unspecified type  -     zolpidem (AMBIEN CR) 12.5 MG CR tablet; Take 1 tablet (12.5 mg total) by mouth daily at bedtime as needed for sleep    2. Screening for prostate cancer  -     PSA, Total Screen; Future    3. Screening for diabetes mellitus  -     Comprehensive metabolic panel; Future; Expected date: 2024    4. Stomach ulcer due to nonsteroidal anti-inflammatory drug (NSAID)  -     CBC and differential  -     Iron Panel (Includes Ferritin, Iron Sat%, Iron, and TIBC); Future    5. Screening for cardiovascular condition  -     Lipid panel; Future; Expected date: 2024    6. Varicose veins of foot  Assessment & Plan:  Rec compression socks, vascular  eval if  bothersome, elevate  legs  as  much as  possible      7. Elevated blood pressure reading without diagnosis of hypertension  Assessment & Plan:  BP up due to stress-will recheck  in 4  months             Subjective      Patient presents with:  Medication Refill: On pending.    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 Negative for activity change and appetite change.   Respiratory:  Negative for shortness of breath.    Cardiovascular:  Negative for chest pain.        Possible  varicose  veins in legs   Neurological:  Negative for dizziness, light-headedness and headaches.       Current Outpatient Medications on File Prior to Visit   Medication Sig    Acetaminophen (Tylenol)  "325 MG CAPS Take by mouth as needed    [DISCONTINUED] zolpidem (AMBIEN CR) 12.5 MG CR tablet Take 1 tablet (12.5 mg total) by mouth daily at bedtime as needed for sleep       Objective     /70 (BP Location: Right arm, Patient Position: Sitting, Cuff Size: Standard)   Ht 6' 3\" (1.905 m)   Wt 97.5 kg (215 lb)   BMI 26.87 kg/m²     Physical Exam  Vitals reviewed.   Constitutional:       Appearance: Normal appearance.   Cardiovascular:      Rate and Rhythm: Normal rate and regular rhythm.      Pulses: Normal pulses.      Heart sounds: Normal heart sounds.   Pulmonary:      Effort: Pulmonary effort is normal.      Breath sounds: Normal breath sounds.   Musculoskeletal:      Right lower leg: No edema.      Left lower leg: No edema.   Lymphadenopathy:      Cervical: No cervical adenopathy.   Neurological:      Mental Status: He is alert.   Psychiatric:         Mood and Affect: Mood normal.       Ashley Abad MD    "

## 2024-02-05 ENCOUNTER — APPOINTMENT (OUTPATIENT)
Dept: LAB | Facility: CLINIC | Age: 64
End: 2024-02-05
Payer: COMMERCIAL

## 2024-02-05 DIAGNOSIS — T39.395A STOMACH ULCER DUE TO NONSTEROIDAL ANTI-INFLAMMATORY DRUG (NSAID): ICD-10-CM

## 2024-02-05 DIAGNOSIS — K25.9 STOMACH ULCER DUE TO NONSTEROIDAL ANTI-INFLAMMATORY DRUG (NSAID): ICD-10-CM

## 2024-02-05 DIAGNOSIS — Z13.6 SCREENING FOR CARDIOVASCULAR CONDITION: ICD-10-CM

## 2024-02-05 DIAGNOSIS — Z13.1 SCREENING FOR DIABETES MELLITUS: ICD-10-CM

## 2024-02-05 DIAGNOSIS — Z12.5 SCREENING FOR PROSTATE CANCER: ICD-10-CM

## 2024-02-05 LAB
ALBUMIN SERPL BCP-MCNC: 4.3 G/DL (ref 3.5–5)
ALP SERPL-CCNC: 78 U/L (ref 34–104)
ALT SERPL W P-5'-P-CCNC: 19 U/L (ref 7–52)
ANION GAP SERPL CALCULATED.3IONS-SCNC: 9 MMOL/L
AST SERPL W P-5'-P-CCNC: 20 U/L (ref 13–39)
BASOPHILS # BLD AUTO: 0.03 THOUSANDS/ÂΜL (ref 0–0.1)
BASOPHILS NFR BLD AUTO: 1 % (ref 0–1)
BILIRUB SERPL-MCNC: 0.77 MG/DL (ref 0.2–1)
BUN SERPL-MCNC: 16 MG/DL (ref 5–25)
CALCIUM SERPL-MCNC: 9.5 MG/DL (ref 8.4–10.2)
CHLORIDE SERPL-SCNC: 105 MMOL/L (ref 96–108)
CHOLEST SERPL-MCNC: 168 MG/DL
CO2 SERPL-SCNC: 29 MMOL/L (ref 21–32)
CREAT SERPL-MCNC: 1.25 MG/DL (ref 0.6–1.3)
EOSINOPHIL # BLD AUTO: 0.17 THOUSAND/ÂΜL (ref 0–0.61)
EOSINOPHIL NFR BLD AUTO: 3 % (ref 0–6)
ERYTHROCYTE [DISTWIDTH] IN BLOOD BY AUTOMATED COUNT: 13.1 % (ref 11.6–15.1)
FERRITIN SERPL-MCNC: 19 NG/ML (ref 24–336)
GFR SERPL CREATININE-BSD FRML MDRD: 60 ML/MIN/1.73SQ M
GLUCOSE P FAST SERPL-MCNC: 102 MG/DL (ref 65–99)
HCT VFR BLD AUTO: 42.9 % (ref 36.5–49.3)
HDLC SERPL-MCNC: 51 MG/DL
HGB BLD-MCNC: 13.8 G/DL (ref 12–17)
IMM GRANULOCYTES # BLD AUTO: 0.01 THOUSAND/UL (ref 0–0.2)
IMM GRANULOCYTES NFR BLD AUTO: 0 % (ref 0–2)
IRON SATN MFR SERPL: 23 % (ref 15–50)
IRON SERPL-MCNC: 89 UG/DL (ref 50–212)
LDLC SERPL CALC-MCNC: 100 MG/DL (ref 0–100)
LYMPHOCYTES # BLD AUTO: 1.37 THOUSANDS/ÂΜL (ref 0.6–4.47)
LYMPHOCYTES NFR BLD AUTO: 23 % (ref 14–44)
MCH RBC QN AUTO: 29.1 PG (ref 26.8–34.3)
MCHC RBC AUTO-ENTMCNC: 32.2 G/DL (ref 31.4–37.4)
MCV RBC AUTO: 90 FL (ref 82–98)
MONOCYTES # BLD AUTO: 0.7 THOUSAND/ÂΜL (ref 0.17–1.22)
MONOCYTES NFR BLD AUTO: 12 % (ref 4–12)
NEUTROPHILS # BLD AUTO: 3.82 THOUSANDS/ÂΜL (ref 1.85–7.62)
NEUTS SEG NFR BLD AUTO: 61 % (ref 43–75)
NONHDLC SERPL-MCNC: 117 MG/DL
NRBC BLD AUTO-RTO: 0 /100 WBCS
PLATELET # BLD AUTO: 254 THOUSANDS/UL (ref 149–390)
PMV BLD AUTO: 10.7 FL (ref 8.9–12.7)
POTASSIUM SERPL-SCNC: 4.9 MMOL/L (ref 3.5–5.3)
PROT SERPL-MCNC: 7.3 G/DL (ref 6.4–8.4)
PSA SERPL-MCNC: 1.97 NG/ML (ref 0–4)
RBC # BLD AUTO: 4.75 MILLION/UL (ref 3.88–5.62)
SODIUM SERPL-SCNC: 143 MMOL/L (ref 135–147)
TIBC SERPL-MCNC: 385 UG/DL (ref 250–450)
TRIGL SERPL-MCNC: 84 MG/DL
UIBC SERPL-MCNC: 296 UG/DL (ref 155–355)
WBC # BLD AUTO: 6.1 THOUSAND/UL (ref 4.31–10.16)

## 2024-02-05 PROCEDURE — 83540 ASSAY OF IRON: CPT

## 2024-02-05 PROCEDURE — 80061 LIPID PANEL: CPT

## 2024-02-05 PROCEDURE — 80053 COMPREHEN METABOLIC PANEL: CPT

## 2024-02-05 PROCEDURE — 36415 COLL VENOUS BLD VENIPUNCTURE: CPT

## 2024-02-05 PROCEDURE — 82728 ASSAY OF FERRITIN: CPT

## 2024-02-05 PROCEDURE — G0103 PSA SCREENING: HCPCS

## 2024-02-05 PROCEDURE — 83550 IRON BINDING TEST: CPT

## 2024-05-22 ENCOUNTER — OFFICE VISIT (OUTPATIENT)
Dept: FAMILY MEDICINE CLINIC | Facility: CLINIC | Age: 64
End: 2024-05-22
Payer: COMMERCIAL

## 2024-05-22 VITALS
OXYGEN SATURATION: 99 % | TEMPERATURE: 97 F | BODY MASS INDEX: 26.94 KG/M2 | WEIGHT: 215.5 LBS | SYSTOLIC BLOOD PRESSURE: 138 MMHG | DIASTOLIC BLOOD PRESSURE: 74 MMHG | HEART RATE: 67 BPM

## 2024-05-22 DIAGNOSIS — G47.00 INSOMNIA, UNSPECIFIED TYPE: Primary | ICD-10-CM

## 2024-05-22 DIAGNOSIS — R79.0 LOW IRON STORES: ICD-10-CM

## 2024-05-22 DIAGNOSIS — Z13.6 SCREENING FOR AAA (ABDOMINAL AORTIC ANEURYSM): ICD-10-CM

## 2024-05-22 DIAGNOSIS — R25.1 TREMOR OF UNKNOWN ORIGIN: ICD-10-CM

## 2024-05-22 DIAGNOSIS — I83.90 VARICOSE VEINS OF FOOT: ICD-10-CM

## 2024-05-22 PROBLEM — R03.0 ELEVATED BLOOD PRESSURE READING WITHOUT DIAGNOSIS OF HYPERTENSION: Status: RESOLVED | Noted: 2024-01-18 | Resolved: 2024-05-22

## 2024-05-22 PROCEDURE — 99213 OFFICE O/P EST LOW 20 MIN: CPT | Performed by: FAMILY MEDICINE

## 2024-05-22 NOTE — PROGRESS NOTES
Ambulatory Visit  Name: Leonardo Melendez      : 1960      MRN: [id number]  Encounter Provider: Ashley Abad MD  Encounter Date: 2024   Encounter department: [facility] PRIMARY CARE    Assessment & Plan   1. Insomnia, unspecified type  Assessment & Plan:  Continue  Ambien  2. Screening for AAA (abdominal aortic aneurysm)  -     US abdominal aorta screening aaa; Future; Expected date: 2024  3. Low iron stores  -     CBC and differential  -     Iron Panel (Includes Ferritin, Iron Sat%, Iron, and TIBC); Future  4. Varicose veins of foot  Assessment & Plan:  Uses  compression stockings  with relief  5. Tremor of unknown origin  Assessment & Plan:  Exam c/w  essential tremor, no evidence  Parkinson's, discussed meds, will hold  for now       History of Present Illness     Patient presents with:  Follow-up: For chronic conditions.   mgb   F/u insomnia , doing well on ambien, no early  refills, iron still low  but  improving    I discussed with him that he could be  a candidate for lung cancer CT screening.     The following Shared Decision-Making points were covered:  Benefits of screening were discussed, including the rates of reduction in death from lung cancer and other causes.  Harms of screening were reviewed, including false positive tests, radiation exposure levels, risks of invasive procedures, risks of complications of screening, and risk of overdiagnosis.  I counseled on the importance of adherence to annual lung cancer LDCT screening, impact of co-morbidities, and ability or willingness to undergo diagnosis and treatment.  I counseled on the importance of maintaining abstinence as a former smoker or was counseled on the importance of smoking cessation if a current smoker  Pt  quit  over 40 years ago and  was  less than 15  pack years, does  not  need  Review of Eligibility Criteria: He meets all of the criteria for Lung Cancer Screening.   He is 63 y.o.   He has 20 pack year tobacco  history and is a current smoker or has quit within the past 15 years  He presents no signs or symptoms of lung cancer    After discussion, the patient decided not  to  do lung cancer screening.    Review of Systems   Constitutional:  Negative for activity change, appetite change and fatigue.   Respiratory:  Negative for shortness of breath.    Cardiovascular:  Negative for chest pain.   Neurological:  Positive for tremors. Negative for dizziness, light-headedness and headaches.   Hematological:  Negative for adenopathy.   Psychiatric/Behavioral:  The patient is not nervous/anxious.        Objective     /74 (BP Location: Left arm, Patient Position: Sitting, Cuff Size: Standard)   Pulse 67   Temp (!) 97 °F (36.1 °C) (Temporal)   Wt 97.8 kg (215 lb 8 oz)   SpO2 99%   BMI 26.94 kg/m²     Physical Exam  Vitals reviewed.   Constitutional:       Appearance: Normal appearance.   Cardiovascular:      Rate and Rhythm: Normal rate and regular rhythm.      Pulses: Normal pulses.      Heart sounds: Normal heart sounds.   Pulmonary:      Effort: Pulmonary effort is normal.      Breath sounds: Normal breath sounds.   Musculoskeletal:      Right lower leg: No edema.      Left lower leg: No edema.   Lymphadenopathy:      Cervical: No cervical adenopathy.   Neurological:      Mental Status: He is alert.      Comments: Tremor noted l hand   Psychiatric:         Mood and Affect: Mood normal.       Administrative Statements

## 2024-05-30 DIAGNOSIS — G47.00 INSOMNIA, UNSPECIFIED TYPE: Primary | ICD-10-CM

## 2024-05-30 RX ORDER — ZOLPIDEM TARTRATE 6.25 MG/1
12.5 TABLET, FILM COATED, EXTENDED RELEASE ORAL
Qty: 60 TABLET | Refills: 2 | Status: SHIPPED | OUTPATIENT
Start: 2024-05-30

## 2024-08-21 ENCOUNTER — OFFICE VISIT (OUTPATIENT)
Dept: FAMILY MEDICINE CLINIC | Facility: CLINIC | Age: 64
End: 2024-08-21
Payer: COMMERCIAL

## 2024-08-21 VITALS
DIASTOLIC BLOOD PRESSURE: 84 MMHG | OXYGEN SATURATION: 99 % | HEIGHT: 75 IN | SYSTOLIC BLOOD PRESSURE: 136 MMHG | BODY MASS INDEX: 26.49 KG/M2 | WEIGHT: 213 LBS | HEART RATE: 60 BPM

## 2024-08-21 DIAGNOSIS — L29.9 PRURITUS: ICD-10-CM

## 2024-08-21 DIAGNOSIS — G47.00 INSOMNIA, UNSPECIFIED TYPE: ICD-10-CM

## 2024-08-21 DIAGNOSIS — Z00.00 ANNUAL PHYSICAL EXAM: Primary | ICD-10-CM

## 2024-08-21 DIAGNOSIS — R79.0 LOW FERRITIN LEVEL: ICD-10-CM

## 2024-08-21 PROCEDURE — 99214 OFFICE O/P EST MOD 30 MIN: CPT | Performed by: NURSE PRACTITIONER

## 2024-08-21 PROCEDURE — 99397 PER PM REEVAL EST PAT 65+ YR: CPT | Performed by: NURSE PRACTITIONER

## 2024-08-21 RX ORDER — ZOLPIDEM TARTRATE 6.25 MG/1
12.5 TABLET, FILM COATED, EXTENDED RELEASE ORAL
Qty: 60 TABLET | Refills: 2 | Status: SHIPPED | OUTPATIENT
Start: 2024-08-21

## 2024-08-21 RX ORDER — ZOLPIDEM TARTRATE 6.25 MG/1
12.5 TABLET, FILM COATED, EXTENDED RELEASE ORAL
Qty: 60 TABLET | Refills: 2 | Status: CANCELLED | OUTPATIENT
Start: 2024-08-21

## 2024-08-21 RX ORDER — FEXOFENADINE HCL 180 MG/1
180 TABLET ORAL DAILY
COMMUNITY

## 2024-08-21 NOTE — ASSESSMENT & PLAN NOTE
Patient does have a repeat CBC with differential and iron panel ordered to be completed prior to his next office visit to assess the status of this.

## 2024-08-21 NOTE — PROGRESS NOTES
Adult Annual Physical  Name: Leonardo Melendez      : 1960      MRN: [id number]  Encounter Provider: JAYLEN Pretty  Encounter Date: 2024   Encounter department: [facility] PRIMARY CARE    Assessment & Plan   1. Annual physical exam  2. Insomnia, unspecified type  Assessment & Plan:  Well-controlled on current regimen.    3. Low ferritin level  Assessment & Plan:  Patient does have a repeat CBC with differential and iron panel ordered to be completed prior to his next office visit to assess the status of this.  4. Pruritus  Assessment & Plan:  No rashes or other abnormalities were noted in the affected area however, patient's symptoms do seem consistent with dermatitis.  Patient does have Allegra on hand at home and he was advised to begin taking this daily to see if this improves his symptoms.  If no improvement is noted in his symptoms after 1 week he was advised to contact the office and allergy testing will be ordered to be completed.    Immunizations and preventive care screenings were discussed with patient today. Appropriate education was printed on patient's after visit summary.    Discussed risks and benefits of prostate cancer screening. We discussed the controversial history of PSA screening for prostate cancer in the [place] as well as the risk of over detection and over treatment of prostate cancer by way of PSA screening.  The patient understands that PSA blood testing is an imperfect way to screen for prostate cancer and that elevated PSA levels in the blood may also be caused by infection, inflammation, prostatic trauma or manipulation, urological procedures, or by benign prostatic enlargement.    The role of the digital rectal examination in prostate cancer screening was also discussed and I discussed with him that there is large interobserver variability in the findings of digital rectal examination.    Counseling:  Alcohol/drug use: discussed moderation in  alcohol intake, the recommendations for healthy alcohol use, and avoidance of illicit drug use.  Dental Health: discussed importance of regular tooth brushing, flossing, and dental visits.  Injury prevention: discussed safety/seat belts, safety helmets, smoke detectors, carbon dioxide detectors, and smoking near bedding or upholstery.  Sexual health: discussed sexually transmitted diseases, partner selection, use of condoms, avoidance of unintended pregnancy, and contraceptive alternatives.  Exercise: the importance of regular exercise/physical activity was discussed. Recommend exercise 3-5 times per week for at least 30 minutes.     Insomnia: Well-controlled with as needed use of Ambien.    Low ferritin: Patient's most recent ferritin level was slightly low but had improved from prior.    Pruritus: The patient reports that over the past few weeks he has noted recurrent episodes of pruritus of his left upper back.  He reports that the pruritus is only localized to this area.  He does report feeling as though the area is raised at times however, when he has his wife look at the area she states that there is no rash or other skin changes present.  He does report that he has used cortisone cream on the area and this does improve his symptoms.     History of Present Illness     Adult Annual Physical:  Patient presents for annual physical.     Diet and Physical Activity:  - Diet/Nutrition: well balanced diet, consuming 3-5 servings of fruits/vegetables daily and limited junk food.  - Exercise: walking, 1-2 hours on average and 5-7 times a week on average.    General Health:  - Sleep: 7-8 hours of sleep on average and sleeps well.  - Hearing: normal hearing bilateral ears.  - Vision: wears glasses, goes for regular eye exams and most recent eye exam < 1 year ago.  - Dental: brushes teeth twice daily and no dental visits for > 1 year.     Health:  - History of STDs: no.   - Urinary symptoms: none.     Advanced Care  "Planning:  - Has an advanced directive?: no    - Has a durable medical POA?: no    - ACP document given to patient?: no      Review of Systems   Constitutional:  Negative for chills and fever.   HENT:  Negative for ear pain and sore throat.    Eyes:  Negative for pain and visual disturbance.   Respiratory:  Negative for cough, chest tightness, shortness of breath and wheezing.    Cardiovascular:  Negative for chest pain, palpitations and leg swelling.   Gastrointestinal:  Negative for abdominal pain, constipation, diarrhea, nausea and vomiting.   Endocrine: Negative for cold intolerance and heat intolerance.   Genitourinary:  Negative for decreased urine volume, dysuria and hematuria.   Musculoskeletal:  Negative for arthralgias, back pain and myalgias.   Skin:  Negative for color change and rash.   Allergic/Immunologic: Positive for environmental allergies.   Neurological:  Negative for dizziness, seizures, syncope, weakness, light-headedness, numbness and headaches.   Hematological:  Negative for adenopathy.   Psychiatric/Behavioral:  Negative for confusion, dysphoric mood, self-injury, sleep disturbance and suicidal ideas. The patient is not nervous/anxious.    All other systems reviewed and are negative.        Objective     /84 (BP Location: Right arm, Patient Position: Sitting, Cuff Size: Standard)   Pulse 60   Ht 6' 3\" (1.905 m)   Wt 96.6 kg (213 lb)   SpO2 99%   BMI 26.62 kg/m²     Physical Exam  Vitals and nursing note reviewed.   Constitutional:       General: He is not in acute distress.     Appearance: Normal appearance. He is well-developed. He is not ill-appearing.   HENT:      Head: Normocephalic.      Right Ear: Hearing normal. A middle ear effusion (small) is present.      Left Ear: Hearing normal. A middle ear effusion (small) is present.   Eyes:      Conjunctiva/sclera: Conjunctivae normal.   Cardiovascular:      Rate and Rhythm: Normal rate and regular rhythm.      Pulses: Normal " pulses.           Carotid pulses are 2+ on the right side and 2+ on the left side.       Radial pulses are 2+ on the right side and 2+ on the left side.        Posterior tibial pulses are 2+ on the right side and 2+ on the left side.      Heart sounds: Normal heart sounds. No murmur heard.  Pulmonary:      Effort: Pulmonary effort is normal. No respiratory distress.      Breath sounds: Normal breath sounds. No decreased breath sounds, wheezing, rhonchi or rales.   Abdominal:      General: Abdomen is flat. Bowel sounds are normal. There is no distension.      Palpations: Abdomen is soft.      Tenderness: There is no abdominal tenderness. There is no guarding.   Musculoskeletal:         General: No swelling. Normal range of motion.      Cervical back: Normal range of motion and neck supple.      Right lower leg: No edema.      Left lower leg: No edema.   Skin:     General: Skin is warm and dry.      Capillary Refill: Capillary refill takes less than 2 seconds.          Neurological:      General: No focal deficit present.      Mental Status: He is alert and oriented to person, place, and time.   Psychiatric:         Mood and Affect: Mood normal.         Behavior: Behavior normal.         Thought Content: Thought content normal.         Judgment: Judgment normal.

## 2024-08-21 NOTE — PATIENT INSTRUCTIONS
"Patient Education     Routine physical for adults   The Basics   Written by the doctors and editors at [facility]   What is a physical? -- A physical is a routine visit, or \"check-up,\" with your doctor. You might also hear it called a \"wellness visit\" or \"preventive visit.\"  During each visit, the doctor will:   Ask about your physical and mental health   Ask about your habits, behaviors, and lifestyle   Do an exam   Give you vaccines if needed   Talk to you about any medicines you take   Give advice about your health   Answer your questions  Getting regular check-ups is an important part of taking care of your health. It can help your doctor find and treat any problems you have. But it's also important for preventing health problems.  A routine physical is different from a \"sick visit.\" A sick visit is when you see a doctor because of a health concern or problem. Since physicals are scheduled ahead of time, you can think about what you want to ask the doctor.  How often should I get a physical? -- It depends on your age and health. In general, for people age 21 years and older:   If you are younger than 50 years, you might be able to get a physical every 3 years.   If you are 50 years or older, your doctor might recommend a physical every year.  If you have an ongoing health condition, like diabetes or high blood pressure, your doctor will probably want to see you more often.  What happens during a physical? -- In general, each visit will include:   Physical exam - The doctor or nurse will check your height, weight, heart rate, and blood pressure. They will also look at your eyes and ears. They will ask about how you are feeling and whether you have any symptoms that bother you.   Medicines - It's a good idea to bring a list of all the medicines you take to each doctor visit. Your doctor will talk to you about your medicines and answer any questions. Tell them if you are having any side effects that bother you. You " "should also tell them if you are having trouble paying for any of your medicines.   Habits and behaviors - This includes:   Your diet   Your exercise habits   Whether you smoke, drink alcohol, or use drugs   Whether you are sexually active   Whether you feel safe at home  Your doctor will talk to you about things you can do to improve your health and lower your risk of health problems. They will also offer help and support. For example, if you want to quit smoking, they can give you advice and might prescribe medicines. If you want to improve your diet or get more physical activity, they can help you with this, too.   Lab tests, if needed - The tests you get will depend on your age and situation. For example, your doctor might want to check your:   Cholesterol   Blood sugar   Iron level   Vaccines - The recommended vaccines will depend on your age, health, and what vaccines you already had. Vaccines are very important because they can prevent certain serious or deadly infections.   Discussion of screening - \"Screening\" means checking for diseases or other health problems before they cause symptoms. Your doctor can recommend screening based on your age, risk, and preferences. This might include tests to check for:   Cancer, such as breast, prostate, cervical, ovarian, colorectal, prostate, lung, or skin cancer   Sexually transmitted infections, such as chlamydia and gonorrhea   Mental health conditions like depression and anxiety  Your doctor will talk to you about the different types of screening tests. They can help you decide which screenings to have. They can also explain what the results might mean.   Answering questions - The physical is a good time to ask the doctor or nurse questions about your health. If needed, they can refer you to other doctors or specialists, too.  Adults older than 65 years often need other care, too. As you get older, your doctor will talk to you about:   How to prevent falling at " home   Hearing or vision tests   Memory testing   How to take your medicines safely   Making sure that you have the help and support you need at home  All topics are updated as new evidence becomes available and our peer review process is complete.  This topic retrieved from [employer] on: May 02, 2024.  Topic 950292 Version 1.0  Release: 32.4.3 - C32.122  © 2024 UpToDate, Inc. and/or its affiliates. All rights reserved.  Consumer Information Use and Disclaimer   Disclaimer: This generalized information is a limited summary of diagnosis, treatment, and/or medication information. It is not meant to be comprehensive and should be used as a tool to help the user understand and/or assess potential diagnostic and treatment options. It does NOT include all information about conditions, treatments, medications, side effects, or risks that may apply to a specific patient. It is not intended to be medical advice or a substitute for the medical advice, diagnosis, or treatment of a health care provider based on the health care provider's examination and assessment of a patient's specific and unique circumstances. Patients must speak with a health care provider for complete information about their health, medical questions, and treatment options, including any risks or benefits regarding use of medications. This information does not endorse any treatments or medications as safe, effective, or approved for treating a specific patient. UpToDate, Inc. and its affiliates disclaim any warranty or liability relating to this information or the use thereof.The use of this information is governed by the Terms of Use, available at https://www.woltersVoluBilluwer.com/en/know/clinical-effectiveness-terms. 2024© UpToDate, Inc. and its affiliates and/or licensors. All rights reserved.  Copyright   © 2024 UpToDate, Inc. and/or its affiliates. All rights reserved.

## 2024-08-21 NOTE — ASSESSMENT & PLAN NOTE
No rashes or other abnormalities were noted in the affected area however, patient's symptoms do seem consistent with dermatitis.  Patient does have Allegra on hand at home and he was advised to begin taking this daily to see if this improves his symptoms.  If no improvement is noted in his symptoms after 1 week he was advised to contact the office and allergy testing will be ordered to be completed.

## 2024-08-21 NOTE — PROGRESS NOTES
"Ambulatory Visit  Name: Leonardo Melendez      : 1960      MRN: [id number]  Encounter Provider: JAYLEN Pretty  Encounter Date: 2024   Encounter department: [facility]    Assessment & Plan   1. Insomnia, unspecified type       History of Present Illness   {Disappearing Hyperlinks I Encounters * My Last Note * Since Last Visit * History :77909}  HPI    Review of Systems  {Select to Display PMH (Optional):40163}  Objective   {Disappearing Hyperlinks   Review Vitals * Enter New Vitals * Results Review * Labs * Imaging * Cardiology * Procedures * Lung Cancer Screening :40329}  /84 (BP Location: Right arm, Patient Position: Sitting, Cuff Size: Standard)   Pulse 60   Ht 6' 3\" (1.905 m)   Wt 96.6 kg (213 lb)   SpO2 99%   BMI 26.62 kg/m²     Physical Exam  Administrative Statements {Disappearing Hyperlinks I  Level of Service * PCMH/PCSP:21000}  {Time Spent Statement (Optional):77771}  "

## 2024-11-04 DIAGNOSIS — G47.00 INSOMNIA, UNSPECIFIED TYPE: ICD-10-CM

## 2024-11-04 NOTE — TELEPHONE ENCOUNTER
Reason for call:   [x] Refill   [] Prior Auth  [] Other:     Office:   [x] PCP/Provider - Ashley Abad MD   [] Specialty/Provider -     Medication: zolpidem (AMBIEN CR) 6.25 MG CR tablet     Dose/Frequency:     12.5 mg, Oral, Daily at bedtime PRN       Quantity: 60    Pharmacy: [id number] - [place] - [id number]     Does the patient have enough for 3 days?   [x] Yes   [] No - Send as HP to POD

## 2024-11-05 RX ORDER — ZOLPIDEM TARTRATE 6.25 MG/1
12.5 TABLET, FILM COATED, EXTENDED RELEASE ORAL
Qty: 60 TABLET | Refills: 0 | OUTPATIENT
Start: 2024-11-05

## 2024-11-07 DIAGNOSIS — G47.00 INSOMNIA, UNSPECIFIED TYPE: ICD-10-CM

## 2024-11-07 NOTE — TELEPHONE ENCOUNTER
Patient called back. He was wrong. The label said 6.25mg Take 2 daily. But he thinks he only got 30 tabs.  I advised he talk to the pharmacy and get back to us.

## 2024-11-07 NOTE — TELEPHONE ENCOUNTER
Patient is requesting a call back. Advised patient I would notify the medical assistant he was previously speaking with to contact him back.    Patient's contact number:  [phone]

## 2024-11-07 NOTE — TELEPHONE ENCOUNTER
I spoke to patient. Once I asked him about meds he looked at his bottle. He did not realize the pharmacy gave him 12.5 mg again. They ran out of it months ago and was getting 6.25mg and to take 2 daily. This time, they changed dose to 12.5 and still got #60. He is concerned about this. He has 8 pills left. Please advise.

## 2024-11-07 NOTE — TELEPHONE ENCOUNTER
Patient went to [facility]. They did short him 30 days worth. They gave him the 30 pills now. He should be fine until next refill.

## 2024-11-07 NOTE — TELEPHONE ENCOUNTER
Patient called to check the status of his prescription for Zolpidem.  I advised it is waiting approval.    Patient is very upset he says this happens every month and he is almost out of he medication.    Please review and patient would like a call when sent to the pharmacy

## 2024-11-20 DIAGNOSIS — G47.00 INSOMNIA, UNSPECIFIED TYPE: ICD-10-CM

## 2024-11-20 RX ORDER — ZOLPIDEM TARTRATE 6.25 MG/1
12.5 TABLET, FILM COATED, EXTENDED RELEASE ORAL
Qty: 60 TABLET | Refills: 2 | Status: SHIPPED | OUTPATIENT
Start: 2024-11-20

## 2024-11-26 ENCOUNTER — TELEPHONE (OUTPATIENT)
Dept: FAMILY MEDICINE CLINIC | Facility: CLINIC | Age: 64
End: 2024-11-26

## 2024-11-27 NOTE — TELEPHONE ENCOUNTER
PA for zolpidem 6.25MG SUBMITTED to [employer]    via    []CMM-KEY:   [x][employer]-Case ID # [id number]   []Availity-Auth ID # NDC #   []Faxed to plan   []Other website   []Phone call Case ID #     [x]PA sent as URGENT    All office notes, labs and other pertaining documents and studies sent. Clinical questions answered. Awaiting determination from insurance company.     Turnaround time for your insurance to make a decision on your Prior Authorization can take 7-21 business days.

## 2024-11-27 NOTE — TELEPHONE ENCOUNTER
PA for zolpidem 6.25MG APPROVED     Date(s) approved: 11/27/2024-11/27/2026    Case #[id number]     Patient advised by          []MyChart Message  [x]Phone call   []LMOM  []L/M to call office as no active Communication consent on file  []Unable to leave detailed message as VM not approved on Communication consent       Pharmacy advised by    [x]Fax  []Phone call    Approval letter scanned into Media Yes

## 2024-11-29 ENCOUNTER — OFFICE VISIT (OUTPATIENT)
Dept: FAMILY MEDICINE CLINIC | Facility: CLINIC | Age: 64
End: 2024-11-29
Payer: COMMERCIAL

## 2024-11-29 VITALS
OXYGEN SATURATION: 99 % | SYSTOLIC BLOOD PRESSURE: 136 MMHG | BODY MASS INDEX: 26.86 KG/M2 | HEIGHT: 75 IN | DIASTOLIC BLOOD PRESSURE: 78 MMHG | HEART RATE: 71 BPM | WEIGHT: 216 LBS

## 2024-11-29 DIAGNOSIS — D22.9 ATYPICAL MOLE: ICD-10-CM

## 2024-11-29 DIAGNOSIS — F41.9 ANXIETY: ICD-10-CM

## 2024-11-29 DIAGNOSIS — G47.00 INSOMNIA, UNSPECIFIED TYPE: ICD-10-CM

## 2024-11-29 DIAGNOSIS — R25.1 TREMOR OF UNKNOWN ORIGIN: Primary | ICD-10-CM

## 2024-11-29 PROCEDURE — 99214 OFFICE O/P EST MOD 30 MIN: CPT | Performed by: FAMILY MEDICINE

## 2024-11-29 RX ORDER — PRIMIDONE 50 MG/1
50 TABLET ORAL EVERY 12 HOURS SCHEDULED
Qty: 60 TABLET | Refills: 5 | Status: SHIPPED | OUTPATIENT
Start: 2024-11-29

## 2024-11-29 RX ORDER — METOPROLOL SUCCINATE 25 MG/1
25 TABLET, EXTENDED RELEASE ORAL DAILY
COMMUNITY
Start: 2024-11-18

## 2024-11-29 RX ORDER — ESCITALOPRAM OXALATE 10 MG/1
10 TABLET ORAL DAILY
Qty: 30 TABLET | Refills: 5 | Status: SHIPPED | OUTPATIENT
Start: 2024-11-29 | End: 2025-05-28

## 2024-11-29 NOTE — ASSESSMENT & PLAN NOTE
Trial Primidone, neurology    Orders:    primidone (MYSOLINE) 50 mg tablet; Take 1 tablet (50 mg total) by mouth every 12 (twelve) hours    Ambulatory Referral to Neurology; Future

## 2024-11-29 NOTE — PROGRESS NOTES
"Name: Leonardo Melendez      : 1960      MRN: [id number]  Encounter Provider: Ashley Abad MD  Encounter Date: 2024   Encounter department: [facility]  :  Assessment & Plan  Insomnia, unspecified type  Stable on med, is planning on retiring so may not  need  med         Atypical mole  Derm referral    Orders:    Ambulatory Referral to Dermatology; Future    Tremor of unknown origin  Trial Primidone, neurology    Orders:    primidone (MYSOLINE) 50 mg tablet; Take 1 tablet (50 mg total) by mouth every 12 (twelve) hours    Ambulatory Referral to Neurology; Future    Anxiety  Worried  about everything, will  rx  Lexapro, trial Silver  Cloud    Orders:    escitalopram (LEXAPRO) 10 mg tablet; Take 1 tablet (10 mg total) by mouth daily           History of Present Illness     Patient presents with:  Follow-up: Pt reports hands, arms still shake . Pt has a spot on his back that itches  Anxiety         Review of Systems   Constitutional:  Negative for activity change, appetite change and fatigue.   Respiratory:  Negative for shortness of breath.    Cardiovascular:  Negative for chest pain.   Skin:         Itchy  mole  in tattoo on back   Neurological:  Positive for tremors. Negative for dizziness, light-headedness and headaches.   Psychiatric/Behavioral:  Positive for sleep disturbance. The patient is nervous/anxious.           Objective   /78 (BP Location: Right arm, Patient Position: Sitting, Cuff Size: Adult)   Pulse 71   Ht 6' 3\" (1.905 m)   Wt 98 kg (216 lb)   SpO2 99%   BMI 27.00 kg/m²      Physical Exam  Vitals reviewed.   Constitutional:       Appearance: Normal appearance.   Cardiovascular:      Rate and Rhythm: Normal rate and regular rhythm.      Pulses: Normal pulses.      Heart sounds: Normal heart sounds.   Pulmonary:      Effort: Pulmonary effort is normal.      Breath sounds: Normal breath sounds.   Lymphadenopathy:      Cervical: No cervical adenopathy.   Skin:   "   Findings: Lesion present.      Comments: 0.3  cm dark mole  in tattoo  on back, possible  bcc  r  shoulder   Neurological:      Mental Status: He is alert.   Psychiatric:         Mood and Affect: Mood normal.

## 2024-11-29 NOTE — ASSESSMENT & PLAN NOTE
Worried  about everything, will  rx  Lexapro, trial Silver  Cloud    Orders:    escitalopram (LEXAPRO) 10 mg tablet; Take 1 tablet (10 mg total) by mouth daily

## 2025-02-18 DIAGNOSIS — G47.00 INSOMNIA, UNSPECIFIED TYPE: ICD-10-CM

## 2025-02-18 RX ORDER — ZOLPIDEM TARTRATE 6.25 MG/1
12.5 TABLET, FILM COATED, EXTENDED RELEASE ORAL
Qty: 60 TABLET | Refills: 0 | Status: SHIPPED | OUTPATIENT
Start: 2025-02-18

## 2025-02-18 NOTE — TELEPHONE ENCOUNTER
Reason for call:   [x] Refill   [] Prior Auth  [] Other:     Office:   [x] PCP/Provider - Dr Abad   [] Specialty/Provider -     Medication: zolpidem     Dose/Frequency: 6.25 mg take 2 tablets at bedtime as needed     Quantity: 60    Ph[facility]acy: [facility] [place]      Does the patient have enough for 3 days?   [x] Yes   [] No - Send as HP to POD

## 2025-03-03 ENCOUNTER — OFFICE VISIT (OUTPATIENT)
Dept: FAMILY MEDICINE CLINIC | Facility: CLINIC | Age: 65
End: 2025-03-03
Payer: MEDICARE

## 2025-03-03 ENCOUNTER — TELEPHONE (OUTPATIENT)
Dept: FAMILY MEDICINE CLINIC | Facility: CLINIC | Age: 65
End: 2025-03-03

## 2025-03-03 VITALS
DIASTOLIC BLOOD PRESSURE: 80 MMHG | OXYGEN SATURATION: 97 % | HEIGHT: 75 IN | WEIGHT: 217 LBS | RESPIRATION RATE: 20 BRPM | SYSTOLIC BLOOD PRESSURE: 130 MMHG | HEART RATE: 112 BPM | BODY MASS INDEX: 26.98 KG/M2

## 2025-03-03 DIAGNOSIS — R25.1 TREMOR OF UNKNOWN ORIGIN: ICD-10-CM

## 2025-03-03 DIAGNOSIS — G47.00 INSOMNIA, UNSPECIFIED TYPE: ICD-10-CM

## 2025-03-03 DIAGNOSIS — D22.9 ATYPICAL MOLE: Primary | ICD-10-CM

## 2025-03-03 DIAGNOSIS — I47.10 SVT (SUPRAVENTRICULAR TACHYCARDIA) (HCC): ICD-10-CM

## 2025-03-03 PROCEDURE — 99214 OFFICE O/P EST MOD 30 MIN: CPT | Performed by: FAMILY MEDICINE

## 2025-03-03 PROCEDURE — G2211 COMPLEX E/M VISIT ADD ON: HCPCS | Performed by: FAMILY MEDICINE

## 2025-03-03 NOTE — TELEPHONE ENCOUNTER
Patient brought in letter from [employer] stating Zolpidem ER 6.25mg is no longer covered.  Please see scanned letter from today.  Alternatives are  Trazadone, Belsomra and Zolpidem.  He has less than 30 days supply and is scheduled for follow up with you April 4, 2025.

## 2025-03-03 NOTE — Clinical Note
Patient received a letter that Ayden CERDA was no longer covered, but he does have a fair amount of the left. He would like to try Ambien. Since you normally follow him for this, I will defer to you.

## 2025-03-03 NOTE — PROGRESS NOTES
Name: Leonardo Melendez      : 1960      MRN: [id number]  Encounter Provider: Alphonso Samuels MD  Encounter Date: 3/3/2025   Encounter department: [facility]  :  Assessment & Plan  Atypical mole  Clinical photographs taken of his back today.  I could not identify any specific mole that was present.  He does identify an area of pruritus, but no mole is noted in that area.  At some point, it would be appropriate to have dermatology take a look just to make sure, but again I could not identify 1 specific spot.  He did mention that by using hydrocortisone the pruritus is significantly improved.       Insomnia, unspecified type  Patient did have problems previously when he used Ambien plain, i.e. did not seem to work as well.  Using the CR he had improvement.  Based on the letter from insurance, would consider trial of Ambien once he is ready for the new prescription.  He currently does have the CR available.  Will forward this to PCP so that she can review.         SVT (supraventricular tachycardia) (HCC)         Tremor of unknown origin  Patient has not been using the primidone.  He does have an appointment with neurology coming up.  Will continue to follow with them at this point.             1. Atypical mole  Assessment & Plan:  Clinical photographs taken of his back today.  I could not identify any specific mole that was present.  He does identify an area of pruritus, but no mole is noted in that area.  At some point, it would be appropriate to have dermatology take a look just to make sure, but again I could not identify 1 specific spot.  He did mention that by using hydrocortisone the pruritus is significantly improved.       2. Insomnia, unspecified type  Assessment & Plan:  Patient did have problems previously when he used Ambien plain, i.e. did not seem to work as well.  Using the CR he had improvement.  Based on the letter from insurance, would consider trial of Ambien once  "he is ready for the new prescription.  He currently does have the CR available.  Will forward this to PCP so that she can review.         3. SVT (supraventricular tachycardia) (HCC)  Comments:  SVT was noted in the hospital recently.  Following with cardiology.  They allowed him to decrease use of metoprolol.  No symptoms currently  4. Tremor of unknown origin  Assessment & Plan:  Patient has not been using the primidone.  He does have an appointment with neurology coming up.  Will continue to follow with them at this point.           Chief Complaint   Patient presents with   • Medication Management            History of Present Illness   Here to follow up with this office for medications.    He has been on Ambien CR for quite a while.  Retired recently. Ambien CR is no longer covered.  He reports Rx was recently filled, but letter stating that this is no longer covered, and so only 30d.    Pateint was on Ambien before (IR) initially.  That did not seem to help well.  He then went to Ambien CR, and that seemed to help.    He also wondered about weening off Ambien CR anyway.          Review of Systems   Constitutional: Negative.    HENT: Negative.     Respiratory: Negative.     Cardiovascular: Negative.    Gastrointestinal: Negative.    Skin:  Positive for rash (itch in places on back.).   Neurological:  Positive for tremors (unchanged).       Objective   /80 (BP Location: Left arm, Patient Position: Sitting, Cuff Size: Large)   Pulse (!) 112   Resp 20   Ht 6' 3\" (1.905 m)   Wt 98.4 kg (217 lb)   SpO2 97%   BMI 27.12 kg/m²      Physical Exam  Vitals and nursing note reviewed.   Constitutional:       Appearance: Normal appearance. He is well-developed.   HENT:      Head: Normocephalic and atraumatic.   Cardiovascular:      Rate and Rhythm: Normal rate and regular rhythm.      Pulses:           Carotid pulses are 2+ on the right side and 2+ on the left side.     Heart sounds: Normal heart sounds. No murmur " heard.     No friction rub. No gallop.   Pulmonary:      Effort: Pulmonary effort is normal. No respiratory distress.      Breath sounds: Normal breath sounds. No wheezing or rales.   Musculoskeletal:      Cervical back: Normal range of motion and neck supple.   Skin:         Neurological:      Mental Status: He is alert.

## 2025-03-03 NOTE — ASSESSMENT & PLAN NOTE
Patient has not been using the primidone.  He does have an appointment with neurology coming up.  Will continue to follow with them at this point.

## 2025-03-03 NOTE — ASSESSMENT & PLAN NOTE
Patient did have problems previously when he used Ambien plain, i.e. did not seem to work as well.  Using the CR he had improvement.  Based on the letter from insurance, would consider trial of Ambien once he is ready for the new prescription.  He currently does have the CR available.  Will forward this to PCP so that she can review.

## 2025-03-03 NOTE — PATIENT INSTRUCTIONS
1. Atypical mole  Assessment & Plan:  Clinical photographs taken of his back today.  I could not identify any specific mole that was present.  He does identify an area of pruritus, but no mole is noted in that area.  At some point, it would be appropriate to have dermatology take a look just to make sure, but again I could not identify 1 specific spot.  He did mention that by using hydrocortisone the pruritus is significantly improved.  2. Insomnia, unspecified type  Assessment & Plan:  Patient did have problems previously when he used Ambien plain, i.e. did not seem to work as well.  Using the CR he had improvement.  Based on the letter from insurance, would consider trial of Ambien once he is ready for the new prescription.  He currently does have the CR available.  Will forward this to PCP so that she can review.    3. SVT (supraventricular tachycardia) (HCC)  Comments:  SVT was noted in the hospital recently.  Following with cardiology.  They allowed him to decrease use of metoprolol.  No symptoms currently  4. Tremor of unknown origin  Assessment & Plan:  Patient has not been using the primidone.  He does have an appointment with neurology coming up.  Will continue to follow with them at this point.      COVID 19 Instructions    Leonardo Melendez was advised to limit contact with others to essential tasks such as getting food, medications, and medical care.    Proper handwashing reviewed, and Hand sanitzer when washing is not available.    If the patient develops symptoms of COVID 19, the patient should call the office as soon as possible.    It is strongly recommended that Flu Vaccinations be obtained.      Virtual Visits:  Selina: This works on smart phones (any phone with Internet browsing capability).  You should get a text message when the provider is ready to see you.  Click on the link in the text message, and the call should start.  You will need to type in your name, and allow camera and microphone  access.  This is HIPPA compliant, and secure.      If you have not already done so, get immunized to COVID 19.      We are committed to getting you vaccinated as soon as possible and will be closely following [facility] and [facility] guidelines as they are released and revised.  Please refer to our COVID-19 vaccine webpage for the most up to date information on the vaccine and our distribution efforts.    This site will also have the most up to date recommendations for COVID booster vaccine.    https://www.slhn.org/covid-19/protect-yourself/covid-19-vaccine    Call [phone] ([phone]), option 7    You can also visit https://www.vaccines.gov/ to find vaccines in your area.    OUR LOCATION:    [facility]  [address] [place] [address], [address]  [place], [place], [address] [phone]  Fax: [phone]    Lab services, Rheumatology, and OB/GYN are at this location as well.

## 2025-03-03 NOTE — ASSESSMENT & PLAN NOTE
Clinical photographs taken of his back today.  I could not identify any specific mole that was present.  He does identify an area of pruritus, but no mole is noted in that area.  At some point, it would be appropriate to have dermatology take a look just to make sure, but again I could not identify 1 specific spot.  He did mention that by using hydrocortisone the pruritus is significantly improved.

## 2025-03-27 ENCOUNTER — CONSULT (OUTPATIENT)
Dept: NEUROLOGY | Facility: CLINIC | Age: 65
End: 2025-03-27
Payer: MEDICARE

## 2025-03-27 VITALS
OXYGEN SATURATION: 98 % | DIASTOLIC BLOOD PRESSURE: 80 MMHG | WEIGHT: 218 LBS | BODY MASS INDEX: 27.1 KG/M2 | HEIGHT: 75 IN | HEART RATE: 70 BPM | SYSTOLIC BLOOD PRESSURE: 130 MMHG

## 2025-03-27 DIAGNOSIS — G31.84 MILD COGNITIVE IMPAIRMENT: ICD-10-CM

## 2025-03-27 DIAGNOSIS — G25.9 EXTRAPYRAMIDAL AND MOVEMENT DISORDER, UNSPECIFIED: ICD-10-CM

## 2025-03-27 DIAGNOSIS — G20.C PARKINSONISM (HCC): Primary | ICD-10-CM

## 2025-03-27 PROCEDURE — G2211 COMPLEX E/M VISIT ADD ON: HCPCS | Performed by: PHYSICIAN ASSISTANT

## 2025-03-27 PROCEDURE — 99204 OFFICE O/P NEW MOD 45 MIN: CPT | Performed by: PHYSICIAN ASSISTANT

## 2025-03-27 RX ORDER — CARBIDOPA AND LEVODOPA 25; 100 MG/1; MG/1
TABLET ORAL
Qty: 90 TABLET | Refills: 1 | Status: SHIPPED | OUTPATIENT
Start: 2025-03-27

## 2025-03-27 NOTE — PROGRESS NOTES
Name: Leonardo Melendez      : 1960      MRN: [id number]  Encounter Provider: Nanda Diaz PA-C  Encounter Date: 3/27/2025   Encounter department: [facility]  :  Assessment & Plan  Parkinsonism (HCC)  Patient with left greater than right resting greater than action hand tremors over the past year.  Has also noticed some changes in his memory as well as pauses with his speech the past 1 to 2 years. No family history of tremors, no improvement with alcohol.     On exam he was noted to have greater than right bradykinesia, rigidity, resting tremor, primitive reflexes, decreased arm swing and hypomimia consistent with his diagnosis of parkinsonism.  Given his history of cognitive complaints which presented prior to his tremor onset, this would also raise the question of Lewy Body dementia. Will get a brain MRI to rule out any structural cause for his symptoms for completeness as well as updated B12 and TSH.     We had a long discussion in regards to the diagnosis of parkinsonism, prognosis and treatment options.  In the we did discuss testing such as a DaTscan or SynOne and biopsy to help with diagnosis.  Alternatively we discussed a trial of medication to see if this provided any improvement of symptoms.    At this time he would prefer to start a trial with a trial of medication.  Will start Sinemet 25/100 1/2 tab three times a day (about 30 min prior to meals) for the first week.  IF no benefit  then he can further increase to 1tab three times a day.  He will watch for any side effects including hallucinations, dyskinesia (involuntary movements), dizziness when standing, nausea or sleepiness.     If there is no improvement with the addition of Sinemet then may consider SynOne biopsy for further clarification.     He was encouraged to remain active.  We discussed the importance of regular exercise and activity in regards to parkinsonism.         Orders:    TSH, 3rd generation;  Future    Vitamin B12; Future    MRI brain NeuroQuant wo contrast; Future    carbidopa-levodopa (Sinemet)  mg per tablet; Take 1/2 tab tid x 1 week then 1tab tid    Mild cognitive impairment  Scored 25/30 on MoCA in the office today.  He mainly notices issues with his word finding and name recall.  Still able to perform daily functions. Will see if there is any benefit with the addition of Sinemet.  Will also get MRI with NQ and labs for completeness.  Mother and sister with dementia.     Patient was encouraged to increase mind stimulating activities such as reading, crosswords, word searches, puzzles, Soduku, solitaire, coloring and other brain games.  We also discussed the importance of staying physically active and eating a health diet such as the Mediterranean or MIND diet.      Orders:    TSH, 3rd generation; Future    Vitamin B12; Future    MRI brain NeuroQuant wo contrast; Future    Extrapyramidal and movement disorder, unspecified    Orders:    Vitamin B12; Future          History of Present Illness   Dread Melendez is a 64 year old male with SVT followed by Cardiology on Metoprolol, insomnia, and anxiety who presents as a new patient for tremors.     He had noticed some occasional tremor in the hand when holding a cup of coffee or eating for years   He began to notice worsening of the tremors about a year ago  At that time he was having increased stress with his work  Tremors worse on the left   Towards the end of the day he would notice that the left hand was having more tremor more even with rest   He retired in Jan, no clear improvement of the tremors   At this time he notices left greater than right resting tremor, more in the evening   He finds that he will often cross his arms when sitting and watching TV, this will make him feel the tremors more   He will still have some tremors when eating and using a spoon   Tremors do not interfere with sleep   No slowness noted   He can perform all of his  "ADLs on his own  No changes in his walking   No falls   No yelling or thrashing in sleep   He does notice some changes with his short term memory   He feels at times that his brain will \"pause\" he will be talking and the words will not want to form, this has been noticeable for over a year, no clear progression   Struggles with name recall   He will occasionally miss medications, use a pill box   No hallucinations   He has a few drinks on the weekends, no improvement of tremors   Mother had dementia, unclear if she had tremors as well   Sister is starting to show some signs of dementia     States when he initially went to the PCP with the tremors he was given a script for Lexapro and Primidone, he never started the medication          Review of Systems I have personally reviewed the MA's review of systems and made changes as necessary.         Objective   There were no vitals taken for this visit.    Physical Exam  Constitutional:       Appearance: Normal appearance.   HENT:      Right Ear: Hearing normal.      Left Ear: Hearing normal.   Eyes:      General: Lids are normal.      Extraocular Movements: Extraocular movements intact.      Pupils: Pupils are equal, round, and reactive to light.   Pulmonary:      Effort: Pulmonary effort is normal.   Neurological:      Mental Status: He is alert.      Motor: Motor strength is normal.  Psychiatric:         Speech: Speech normal.       Neurological Exam  Mental Status  Alert. Oriented to person, place and time. Speech is normal.  MoCA 25/30 - 3/27/25.    Cranial Nerves  CN III, IV, VI: Extraocular movements intact bilaterally. Normal lids and orbits bilaterally. Pupils equal round and reactive to light bilaterally.  CN V:  Right: Facial sensation is normal.  Left: Facial sensation is normal on the left.  CN VII:  Right: There is no facial weakness.  Left: There is no facial weakness.  CN VIII:  Right: Hearing is normal.  Left: Hearing is normal.  CN IX, X: Palate elevates " symmetrically  CN XI: Shoulder shrug strength is normal.  CN XII: Tongue midline without atrophy or fasciculations.    Motor   Strength is 5/5 throughout all four extremities.    Sensory  Light touch is normal in upper and lower extremities.     Coordination  Right: Finger-to-nose abnormality:Left: Finger-to-nose abnormality:    Gait    Slow to rise  Overall good stride length  Decreased arm swing   No freezing noted .                                    Date of exam:  3/27/25         Speech  2    Facial Expression  2    Rigidity - Neck      Rigidity - Upper Extremity (Right)  2    Rigidity - Upper Extremity (Left)   1    Rigidity - Lower Extremity (Right)  1    Rigidity - Lower Extremity (Left)   1    Finger Taps (Right)   0    Finger Taps (Left)   1    Hand  (Right)  0    Hand  (Left)   1    Pronation/Supination (Right)  1    Pronation/Supination (Left)   1    Heel Taps (Right) 2    Heel Taps(Left) 1    Arising from Chair   1    Gait   2    Postural Stability   1    Posture 2    Global spontaneity of movement 2    Postural Tremor (Right) 2    Postural Tremor (Left) 1    Kinetic Tremor (Right)  1    Kinetic Tremor (Left)  1    Rest tremor  RUE 1    Rest tremor  LUE 2    Rest tremor  RLE 0    Reset tremor  LLE 0    Lip/Jaw Tremor  0    Motor Exam Total:

## 2025-03-27 NOTE — ASSESSMENT & PLAN NOTE
Patient with left greater than right resting greater than action hand tremors over the past year.  Has also noticed some changes in his memory as well as pauses with his speech the past 1 to 2 years. No family history of tremors, no improvement with alcohol.     On exam he was noted to have greater than right bradykinesia, rigidity, resting tremor, primitive reflexes, decreased arm swing and hypomimia consistent with his diagnosis of parkinsonism.  Given his history of cognitive complaints which presented prior to his tremor onset, this would also raise the question of Lewy Body dementia. Will get a brain MRI to rule out any structural cause for his symptoms for completeness as well as updated B12 and TSH.     We had a long discussion in regards to the diagnosis of parkinsonism, prognosis and treatment options.  In the we did discuss testing such as a DaTscan or SynOne and biopsy to help with diagnosis.  Alternatively we discussed a trial of medication to see if this provided any improvement of symptoms.    At this time he would prefer to start a trial with a trial of medication.  Will start Sinemet 25/100 1/2 tab three times a day (about 30 min prior to meals) for the first week.  IF no benefit  then he can further increase to 1tab three times a day.  He will watch for any side effects including hallucinations, dyskinesia (involuntary movements), dizziness when standing, nausea or sleepiness.     If there is no improvement with the addition of Sinemet then may consider SynOne biopsy for further clarification.     He was encouraged to remain active.  We discussed the importance of regular exercise and activity in regards to parkinsonism.         Orders:    TSH, 3rd generation; Future    Vitamin B12; Future    MRI brain NeuroQuant wo contrast; Future    carbidopa-levodopa (Sinemet)  mg per tablet; Take 1/2 tab tid x 1 week then 1tab tid

## 2025-03-27 NOTE — PATIENT INSTRUCTIONS
Patient with left greater than right resting greater than action hand tremors over the past year.  Has also noticed some changes in his memory as well as pauses with his speech the past 1 to 2 years. No family history of tremors, no improvement with alcohol.     On exam he was noted to have greater than right bradykinesia, rigidity, resting tremor, primitive reflexes, decreased arm swing and hypomimia consistent with his diagnosis of parkinsonism.  Given his history of cognitive complaints which presented prior to his tremor onset, this would also raise the question of Lewy Body dementia. Will get a brain MRI to rule out any structural cause for his symptoms for completeness as well as updated B12 and TSH.     We had a long discussion in regards to the diagnosis of parkinsonism, prognosis and treatment options.  In the we did discuss testing such as a DaTscan or SynOne and biopsy to help with diagnosis.  Alternatively we discussed a trial of medication to see if this provided any improvement of symptoms.    At this time he would prefer to start a trial with a trial of medication.  Will start Sinemet 25/100 1/2 tab three times a day (about 30 min prior to meals) for the first week.  IF no benefit  then he can further increase to 1tab three times a day.  He will watch for any side effects including hallucinations, dyskinesia (involuntary movements), dizziness when standing, nausea or sleepiness.     If there is no improvement with the addition of Sinemet then may consider SynOne biopsy for further clarification.     He was encouraged to remain active.  We discussed the importance of regular exercise and activity in regards to parkinsonism.

## 2025-04-01 ENCOUNTER — APPOINTMENT (OUTPATIENT)
Dept: LAB | Facility: CLINIC | Age: 65
End: 2025-04-01
Payer: MEDICARE

## 2025-04-01 DIAGNOSIS — G25.9 EXTRAPYRAMIDAL AND MOVEMENT DISORDER, UNSPECIFIED: ICD-10-CM

## 2025-04-01 DIAGNOSIS — G20.C PARKINSONISM (HCC): ICD-10-CM

## 2025-04-01 DIAGNOSIS — R79.0 LOW IRON STORES: ICD-10-CM

## 2025-04-01 DIAGNOSIS — G31.84 MILD COGNITIVE IMPAIRMENT: ICD-10-CM

## 2025-04-01 LAB
BASOPHILS # BLD AUTO: 0.03 THOUSANDS/ÂΜL (ref 0–0.1)
BASOPHILS NFR BLD AUTO: 1 % (ref 0–1)
EOSINOPHIL # BLD AUTO: 0.17 THOUSAND/ÂΜL (ref 0–0.61)
EOSINOPHIL NFR BLD AUTO: 4 % (ref 0–6)
ERYTHROCYTE [DISTWIDTH] IN BLOOD BY AUTOMATED COUNT: 13.2 % (ref 11.6–15.1)
FERRITIN SERPL-MCNC: 42 NG/ML (ref 24–336)
HCT VFR BLD AUTO: 40.8 % (ref 36.5–49.3)
HGB BLD-MCNC: 13.5 G/DL (ref 12–17)
IMM GRANULOCYTES # BLD AUTO: 0.01 THOUSAND/UL (ref 0–0.2)
IMM GRANULOCYTES NFR BLD AUTO: 0 % (ref 0–2)
IRON SATN MFR SERPL: 25 % (ref 15–50)
IRON SERPL-MCNC: 86 UG/DL (ref 50–212)
LYMPHOCYTES # BLD AUTO: 1.41 THOUSANDS/ÂΜL (ref 0.6–4.47)
LYMPHOCYTES NFR BLD AUTO: 30 % (ref 14–44)
MCH RBC QN AUTO: 30.1 PG (ref 26.8–34.3)
MCHC RBC AUTO-ENTMCNC: 33.1 G/DL (ref 31.4–37.4)
MCV RBC AUTO: 91 FL (ref 82–98)
MONOCYTES # BLD AUTO: 0.6 THOUSAND/ÂΜL (ref 0.17–1.22)
MONOCYTES NFR BLD AUTO: 13 % (ref 4–12)
NEUTROPHILS # BLD AUTO: 2.48 THOUSANDS/ÂΜL (ref 1.85–7.62)
NEUTS SEG NFR BLD AUTO: 52 % (ref 43–75)
NRBC BLD AUTO-RTO: 0 /100 WBCS
PLATELET # BLD AUTO: 221 THOUSANDS/UL (ref 149–390)
PMV BLD AUTO: 10.1 FL (ref 8.9–12.7)
RBC # BLD AUTO: 4.49 MILLION/UL (ref 3.88–5.62)
TIBC SERPL-MCNC: 347.2 UG/DL (ref 250–450)
TRANSFERRIN SERPL-MCNC: 248 MG/DL (ref 203–362)
TSH SERPL DL<=0.05 MIU/L-ACNC: 1.42 UIU/ML (ref 0.45–4.5)
UIBC SERPL-MCNC: 261 UG/DL (ref 155–355)
VIT B12 SERPL-MCNC: 227 PG/ML (ref 180–914)
WBC # BLD AUTO: 4.7 THOUSAND/UL (ref 4.31–10.16)

## 2025-04-01 PROCEDURE — 83550 IRON BINDING TEST: CPT

## 2025-04-01 PROCEDURE — 36415 COLL VENOUS BLD VENIPUNCTURE: CPT

## 2025-04-01 PROCEDURE — 82607 VITAMIN B-12: CPT

## 2025-04-01 PROCEDURE — 82728 ASSAY OF FERRITIN: CPT

## 2025-04-01 PROCEDURE — 83540 ASSAY OF IRON: CPT

## 2025-04-01 PROCEDURE — 84443 ASSAY THYROID STIM HORMONE: CPT

## 2025-04-04 ENCOUNTER — RESULTS FOLLOW-UP (OUTPATIENT)
Dept: FAMILY MEDICINE CLINIC | Facility: CLINIC | Age: 65
End: 2025-04-04

## 2025-04-04 ENCOUNTER — OFFICE VISIT (OUTPATIENT)
Dept: FAMILY MEDICINE CLINIC | Facility: CLINIC | Age: 65
End: 2025-04-04
Payer: MEDICARE

## 2025-04-04 VITALS
BODY MASS INDEX: 26.61 KG/M2 | OXYGEN SATURATION: 97 % | HEART RATE: 67 BPM | DIASTOLIC BLOOD PRESSURE: 90 MMHG | WEIGHT: 214 LBS | HEIGHT: 75 IN | SYSTOLIC BLOOD PRESSURE: 120 MMHG

## 2025-04-04 DIAGNOSIS — Z12.5 SCREENING FOR PROSTATE CANCER: ICD-10-CM
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
## 2025-04-04 DIAGNOSIS — Z13.1 SCREENING FOR DIABETES MELLITUS: ICD-10-CM

## 2025-04-04 DIAGNOSIS — E53.8 VITAMIN B12 DEFICIENCY: ICD-10-CM

## 2025-04-04 DIAGNOSIS — M25.531 RIGHT WRIST PAIN: ICD-10-CM

## 2025-04-04 DIAGNOSIS — Z00.00 MEDICARE ANNUAL WELLNESS VISIT, SUBSEQUENT: Primary | ICD-10-CM

## 2025-04-04 PROCEDURE — 96372 THER/PROPH/DIAG INJ SC/IM: CPT | Performed by: FAMILY MEDICINE

## 2025-04-04 PROCEDURE — G0402 INITIAL PREVENTIVE EXAM: HCPCS | Performed by: FAMILY MEDICINE

## 2025-04-04 PROCEDURE — 99214 OFFICE O/P EST MOD 30 MIN: CPT | Performed by: FAMILY MEDICINE

## 2025-04-04 PROCEDURE — G2211 COMPLEX E/M VISIT ADD ON: HCPCS | Performed by: FAMILY MEDICINE

## 2025-04-04 RX ORDER — CYANOCOBALAMIN 1000 UG/ML
1000 INJECTION, SOLUTION INTRAMUSCULAR; SUBCUTANEOUS
Status: SHIPPED | OUTPATIENT
Start: 2025-04-04 | End: 2025-08-02

## 2025-04-04 RX ADMIN — CYANOCOBALAMIN 1000 MCG: 1000 INJECTION, SOLUTION INTRAMUSCULAR; SUBCUTANEOUS at 14:57

## 2025-04-04 NOTE — PROGRESS NOTES
Name: Leonardo Melendez      : 1960      MRN: [id number]  Encounter Provider: Ashley Abad MD  Encounter Date: 2025   Encounter department: [facility]  :  Assessment & Plan  Medicare annual wellness visit, subsequent  Rec shingles shot ,utd  on other  vaccines       Vitamin B12 deficiency    Orders:    cyanocobalamin injection 1,000 mcg    Vitamin B12; Future       Preventive health issues were discussed with patient, and age appropriate screening tests were ordered as noted in patient's After Visit Summary. Personalized health advice and appropriate referrals for health education or preventive services given if needed, as noted in patient's After Visit Summary.    History of Present Illness     Patient presents with:  Medicare Wellness Visit: Subsequent AWV   Review  lab, B12  level  low normal       Patient Care Team:  Ashley Abad MD as PCP - General (Family Medicine)  MD Shakeel Wei MD    Review of Systems   Constitutional:  Positive for fatigue. Negative for activity change and appetite change.   Respiratory:  Negative for shortness of breath.    Cardiovascular:  Negative for chest pain and palpitations.   Musculoskeletal:  Positive for arthralgias.        R wrist pain, doesn't  recall particular  injury   Neurological:  Positive for tremors. Negative for dizziness, light-headedness and headaches.        Recently dxed with Parkinson's   Hematological:  Negative for adenopathy.   Psychiatric/Behavioral:  Negative for sleep disturbance.      Medical History Reviewed by provider this encounter:       Annual Wellness Visit Questionnaire   Leonardo is here for his Subsequent Wellness visit.     Health Risk Assessment:   Patient rates overall health as fair. Patient feels that their physical health rating is slightly worse. Patient is satisfied with their life. Eyesight was rated as same. Hearing was rated as same. Patient feels that their emotional and mental  health rating is same. Patients states they are never, rarely angry. Patient states they are never, rarely unusually tired/fatigued. Pain experienced in the last 7 days has been some. Patient's pain rating has been 2/10. Patient states that he has experienced no weight loss or gain in last 6 months. Due to tremors, just  started  med  last week    Depression Screening:   PHQ-2 Score: 0      Fall Risk Screening:   In the past year, patient has experienced: no history of falling in past year      Home Safety:  Patient does not have trouble with stairs inside or outside of their home. Patient has working smoke alarms and has working carbon monoxide detector. Home safety hazards include: none.     Nutrition:   Current diet is Regular.     Medications:   Patient is currently taking over-the-counter supplements. OTC medications include: see medication list. Patient is able to manage medications.     Activities of Daily Living (ADLs)/Instrumental Activities of Daily Living (IADLs):   Walk and transfer into and out of bed and chair?: Yes  Dress and groom yourself?: Yes    Bathe or shower yourself?: Yes    Feed yourself? Yes  Do your laundry/housekeeping?: Yes  Manage your money, pay your bills and track your expenses?: Yes  Make your own meals?: Yes    Do your own shopping?: Yes    Previous Hospitalizations:   Any hospitalizations or ED visits within the last 12 months?: Yes    How many hospitalizations have you had in the last year?: 1-2    Advance Care Planning:   Living will: No    Durable POA for healthcare: No    Advanced directive: No      Comments: Wife Lalita  would  be  POA    Cognitive Screening:   Provider or family/friend/caregiver concerned regarding cognition?: No    PREVENTIVE SCREENINGS      Cardiovascular Screening:    General: Screening Current      Diabetes Screening:     General: Risks and Benefits Discussed    Due for: Blood Glucose      Colorectal Cancer Screening:     General: Screening Current       "Prostate Cancer Screening:    General: Risks and Benefits Discussed    Due for: PSA      Osteoporosis Screening:    General: Screening Not Indicated      Abdominal Aortic Aneurysm (AAA) Screening:    Risk factors include: age between 65-74 yo and tobacco use        Lung Cancer Screening:     General: Screening Not Indicated    Screening, Brief Intervention, and Referral to Treatment (SBIRT)     Screening  Typical number of drinks in a day: 4  Typical number of drinks in a week: 7  Interpretation: Low risk drinking behavior.    Single Item Drug Screening:  How often have you used an illegal drug (including marijuana) or a prescription medication for non-medical reasons in the past year? never    Single Item Drug Screen Score: 0  Interpretation: Negative screen for possible drug use disorder       No results found.    Objective   Ht 6' 3\" (1.905 m)   BMI 27.25 kg/m²     Physical Exam  Vitals reviewed.   Constitutional:       Appearance: Normal appearance.   Cardiovascular:      Rate and Rhythm: Normal rate and regular rhythm.      Pulses: Normal pulses.      Heart sounds: Normal heart sounds.   Pulmonary:      Effort: Pulmonary effort is normal.      Breath sounds: Normal breath sounds.   Musculoskeletal:      Right lower leg: No edema.      Left lower leg: No edema.   Lymphadenopathy:      Cervical: No cervical adenopathy.   Neurological:      Mental Status: He is alert.   Psychiatric:         Mood and Affect: Mood normal.         "

## 2025-04-04 NOTE — PATIENT INSTRUCTIONS
Await  xray, b12 shot  today, lab  4  months  Medicare Preventive Visit Patient Instructions  Thank you for completing your Welcome to Medicare Visit or Medicare Annual Wellness Visit today. Your next wellness visit will be due in one year (4/5/2026).  The screening/preventive services that you may require over the next 5-10 years are detailed below. Some tests may not apply to you based off risk factors and/or age. Screening tests ordered at today's visit but not completed yet may show as past due. Also, please note that scanned in results may not display below.  Preventive Screenings:  Service Recommendations Previous Testing/Comments   Colorectal Cancer Screening  Colonoscopy    Fecal Occult Blood Test (FOBT)/Fecal Immunochemical Test (FIT)  Fecal DNA/Cologuard Test  Flexible Sigmoidoscopy Age: 45-75 years old   Colonoscopy: every 10 years (May be performed more frequently if at higher risk)  OR  FOBT/FIT: every 1 year  OR  Cologuard: every 3 years  OR  Sigmoidoscopy: every 5 years  Screening may be recommended earlier than age 45 if at higher risk for colorectal cancer. Also, an individualized decision between you and your healthcare provider will decide whether screening between the ages of 76-85 would be appropriate. Colonoscopy: 02/07/2023  FOBT/FIT: Not on file  Cologuard: Not on file  Sigmoidoscopy: Not on file    Screening Current     Prostate Cancer Screening Individualized decision between patient and health care provider in men between ages of 55-69   Medicare will cover every 12 months beginning on the day after your 50th birthday PSA: 1.97 ng/mL           Hepatitis C Screening Once for adults born between 1945 and 1965  More frequently in patients at high risk for Hepatitis C Hep C Antibody: Not on file        Diabetes Screening 1-2 times per year if you're at risk for diabetes or have pre-diabetes Fasting glucose: 102 mg/dL (2/5/2024)  A1C: No results in last 5 years (No results in last 5 years)       Cholesterol Screening Once every 5 years if you don't have a lipid disorder. May order more often based on risk factors. Lipid panel: 02/05/2024  Screening Current      Other Preventive Screenings Covered by Medicare:  Abdominal Aortic Aneurysm (AAA) Screening: covered once if your at risk. You're considered to be at risk if you have a family history of AAA or a male between the age of 65-75 who smoking at least 100 cigarettes in your lifetime.  Lung Cancer Screening: covers low dose CT scan once per year if you meet all of the following conditions: (1) Age 55-77; (2) No signs or symptoms of lung cancer; (3) Current smoker or have quit smoking within the last 15 years; (4) You have a tobacco smoking history of at least 20 pack years (packs per day x number of years you smoked); (5) You get a written order from a healthcare provider.  Glaucoma Screening: covered annually if you're considered high risk: (1) You have diabetes OR (2) Family history of glaucoma OR (3)  aged 50 and older OR (4)  American aged 65 and older  Osteoporosis Screening: covered every 2 years if you meet one of the following conditions: (1) Have a vertebral abnormality; (2) On glucocorticoid therapy for more than 3 months; (3) Have primary hyperparathyroidism; (4) On osteoporosis medications and need to assess response to drug therapy.  HIV Screening: covered annually if you're between the age of 15-65. Also covered annually if you are younger than 15 and older than 65 with risk factors for HIV infection. For pregnant patients, it is covered up to 3 times per pregnancy.    Immunizations:  Immunization Recommendations   Influenza Vaccine Annual influenza vaccination during flu season is recommended for all persons aged >= 6 months who do not have contraindications   Pneumococcal Vaccine   * Pneumococcal conjugate vaccine = PCV13 (Prevnar 13), PCV15 (Vaxneuvance), PCV20 (Prevnar 20)  * Pneumococcal polysaccharide vaccine =  PPSV23 (Pneumovax) Adults 19-63 yo with certain risk factors or if 65+ yo  If never received any pneumonia vaccine: recommend Prevnar 20 (PCV20)  Give PCV20 if previously received 1 dose of PCV13 or PPSV23   Hepatitis B Vaccine 3 dose series if at intermediate or high risk (ex: diabetes, end stage renal disease, liver disease)   Respiratory syncytial virus (RSV) Vaccine - COVERED BY MEDICARE PART D  * RSVPreF3 (Arexvy) CDC recommends that adults 60 years of age and older may receive a single dose of RSV vaccine using shared clinical decision-making (SCDM)   Tetanus (Td) Vaccine - COST NOT COVERED BY MEDICARE PART B Following completion of primary series, a booster dose should be given every 10 years to maintain immunity against tetanus. Td may also be given as tetanus wound prophylaxis.   Tdap Vaccine - COST NOT COVERED BY MEDICARE PART B Recommended at least once for all adults. For pregnant patients, recommended with each pregnancy.   Shingles Vaccine (Shingrix) - COST NOT COVERED BY MEDICARE PART B  2 shot series recommended in those 19 years and older who have or will have weakened immune systems or those 50 years and older     Health Maintenance Due:      Topic Date Due    Colorectal Cancer Screening  02/07/2028    Hepatitis C Screening  Discontinued     Immunizations Due:      Topic Date Due    Pneumococcal Vaccine: 65+ Years (1 of 1 - PCV) Never done    Influenza Vaccine (1) 09/01/2024    COVID-19 Vaccine (1 - 2024-25 season) Never done     Advance Directives   What are advance directives?  Advance directives are legal documents that state your wishes and plans for medical care. These plans are made ahead of time in case you lose your ability to make decisions for yourself. Advance directives can apply to any medical decision, such as the treatments you want, and if you want to donate organs.   What are the types of advance directives?  There are many types of advance directives, and each state has rules  about how to use them. You may choose a combination of any of the following:  Living will:  This is a written record of the treatment you want. You can also choose which treatments you do not want, which to limit, and which to stop at a certain time. This includes surgery, medicine, IV fluid, and tube feedings.   Durable power of  for healthcare (DPAHC):  This is a written record that states who you want to make healthcare choices for you when you are unable to make them for yourself. This person, called a proxy, is usually a family member or a friend. You may choose more than 1 proxy.  Do not resuscitate (DNR) order:  A DNR order is used in case your heart stops beating or you stop breathing. It is a request not to have certain forms of treatment, such as CPR. A DNR order may be included in other types of advance directives.  Medical directive:  This covers the care that you want if you are in a coma, near death, or unable to make decisions for yourself. You can list the treatments you want for each condition. Treatment may include pain medicine, surgery, blood transfusions, dialysis, IV or tube feedings, and a ventilator (breathing machine).  Values history:  This document has questions about your views, beliefs, and how you feel and think about life. This information can help others choose the care that you would choose.  Why are advance directives important?  An advance directive helps you control your care. Although spoken wishes may be used, it is better to have your wishes written down. Spoken wishes can be misunderstood, or not followed. Treatments may be given even if you do not want them. An advance directive may make it easier for your family to make difficult choices about your care.   Weight Management   Why it is important to manage your weight:  Being overweight increases your risk of health conditions such as heart disease, high blood pressure, type 2 diabetes, and certain types of cancer. It  can also increase your risk for osteoarthritis, sleep apnea, and other respiratory problems. Aim for a slow, steady weight loss. Even a small amount of weight loss can lower your risk of health problems.  How to lose weight safely:  A safe and healthy way to lose weight is to eat fewer calories and get regular exercise. You can lose up about 1 pound a week by decreasing the number of calories you eat by 500 calories each day.   Healthy meal plan for weight management:  A healthy meal plan includes a variety of foods, contains fewer calories, and helps you stay healthy. A healthy meal plan includes the following:  Eat whole-grain foods more often.  A healthy meal plan should contain fiber. Fiber is the part of grains, fruits, and vegetables that is not broken down by your body. Whole-grain foods are healthy and provide extra fiber in your diet. Some examples of whole-grain foods are whole-wheat breads and pastas, oatmeal, brown rice, and bulgur.  Eat a variety of vegetables every day.  Include dark, leafy greens such as spinach, kale, krissy greens, and mustard greens. Eat yellow and orange vegetables such as carrots, sweet potatoes, and winter squash.   Eat a variety of fruits every day.  Choose fresh or canned fruit (canned in its own juice or light syrup) instead of juice. Fruit juice has very little or no fiber.  Eat low-fat dairy foods.  Drink fat-free (skim) milk or 1% milk. Eat fat-free yogurt and low-fat cottage cheese. Try low-fat cheeses such as mozzarella and other reduced-fat cheeses.  Choose meat and other protein foods that are low in fat.  Choose beans or other legumes such as split peas or lentils. Choose fish, skinless poultry (chicken or turkey), or lean cuts of red meat (beef or pork). Before you cook meat or poultry, cut off any visible fat.   Use less fat and oil.  Try baking foods instead of frying them. Add less fat, such as margarine, sour cream, regular salad dressing and mayonnaise to  foods. Eat fewer high-fat foods. Some examples of high-fat foods include french fries, doughnuts, ice cream, and cakes.  Eat fewer sweets.  Limit foods and drinks that are high in sugar. This includes candy, cookies, regular soda, and sweetened drinks.  Exercise:  Exercise at least 30 minutes per day on most days of the week. Some examples of exercise include walking, biking, dancing, and swimming. You can also fit in more physical activity by taking the stairs instead of the elevator or parking farther away from stores. Ask your healthcare provider about the best exercise plan for you.      © Copyright [employer] 2018 Information is for End User's use only and may not be sold, redistributed or otherwise used for commercial purposes. All illustrations and images included in CareNotes® are the copyrighted property of [employer] Inc[employer] or [employer]

## 2025-04-08 ENCOUNTER — APPOINTMENT (OUTPATIENT)
Dept: RADIOLOGY | Facility: CLINIC | Age: 65
End: 2025-04-08
Payer: MEDICARE

## 2025-04-08 ENCOUNTER — RESULTS FOLLOW-UP (OUTPATIENT)
Dept: FAMILY MEDICINE CLINIC | Facility: CLINIC | Age: 65
End: 2025-04-08

## 2025-04-08 ENCOUNTER — APPOINTMENT (OUTPATIENT)
Dept: LAB | Facility: CLINIC | Age: 65
End: 2025-04-08
Payer: MEDICARE

## 2025-04-08 DIAGNOSIS — M25.531 RIGHT WRIST PAIN: ICD-10-CM
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
## 2025-04-08 PROCEDURE — 36415 COLL VENOUS BLD VENIPUNCTURE: CPT

## 2025-04-08 PROCEDURE — 73110 X-RAY EXAM OF WRIST: CPT

## 2025-04-08 PROCEDURE — 86618 LYME DISEASE ANTIBODY: CPT

## 2025-04-09 ENCOUNTER — RESULTS FOLLOW-UP (OUTPATIENT)
Dept: FAMILY MEDICINE CLINIC | Facility: CLINIC | Age: 65
End: 2025-04-09

## 2025-04-09 DIAGNOSIS — M25.531 RIGHT WRIST PAIN: Primary | ICD-10-CM

## 2025-04-09 LAB — B BURGDOR IGG+IGM SER QL IA: NEGATIVE

## 2025-04-17 ENCOUNTER — OFFICE VISIT (OUTPATIENT)
Dept: OBGYN CLINIC | Facility: MEDICAL CENTER | Age: 65
End: 2025-04-17
Payer: MEDICARE

## 2025-04-17 VITALS — BODY MASS INDEX: 26.61 KG/M2 | HEIGHT: 75 IN | WEIGHT: 214 LBS

## 2025-04-17 DIAGNOSIS — M19.031 ARTHRITIS OF RIGHT WRIST: Primary | ICD-10-CM

## 2025-04-17 PROCEDURE — 99203 OFFICE O/P NEW LOW 30 MIN: CPT | Performed by: ORTHOPAEDIC SURGERY

## 2025-04-17 NOTE — LETTER
April 17, 2025     Ashley Abad MD  [address] [address]    Patient: Leonardo Melendez   YOB: 1960   Date of Visit: 4/17/2025       Dear Dr. Ashley Abad MD:    Thank you for referring Leonardo Melendez to me for evaluation. Below are my notes for this consultation.    If you have questions, please do not hesitate to call me. I look forward to following your patient along with you.         Sincerely,        Abilio Estevez MD        CC: No Recipients    Abilio Estevez MD  4/17/2025  2:21 PM  Sign when Signing Visit  The HAND & UPPER EXTREMITY OFFICE VISIT   Referred By:  Ashley Abad Md  [address] [address]      Chief Complaint:     Right wrist pain    History of Present Illness:   64 y.o., male presents with intermittent right wrist pain for about 1 year. He denies any known injury or trauma to the wrist. He reports that the pain occurs intermittently and typically flares up when grabbing objects. The pain can last anywhere from a few seconds up to a few days. He typically takes tylenol as needed which helps with the pain. He also does have a wrist brace which he wears as needed.   Seen and referred by his PCP, Dr. Abad. Note and imaging reviewed in detail today.     ADLs: Community ambulator  Smoke: denies ETOH: occasionally   Drugs:  denies Job: retired       Past Medical History:  Past Medical History:   Diagnosis Date   • Anemia 10/8/2022    During treatment in hospital   • Anxiety unknown    Slight anxiety levels when anticipating a life changing event.   • Difficulty sleeping    • GERD (gastroesophageal reflux disease)    • Impotence, organic    • Insomnia      Past Surgical History:   Procedure Laterality Date   • ADENOIDECTOMY     • BREAST SURGERY      lumpectomy   • HERNIA REPAIR  11/14/2014   • JOINT REPLACEMENT  5/5/2021    total knee replacement   • KNEE SURGERY     • TONSILLECTOMY     • VASECTOMY  1990     Family  History   Problem Relation Age of Onset   • Lung cancer Mother    • Heart disease Father    • Colon cancer Father      Social History     Socioeconomic History   • Marital status: /Civil Union     Spouse name: Not on file   • Number of children: Not on file   • Years of education: Not on file   • Highest education level: Not on file   Occupational History   • Not on file   Tobacco Use   • Smoking status: Former   • Smokeless tobacco: Never   Vaping Use   • Vaping status: Never Used   Substance and Sexual Activity   • Alcohol use: Yes     Alcohol/week: 10.0 standard drinks of alcohol     Types: 10 Cans of beer per week     Comment: I only consume alcohol over weekend or special event.   • Drug use: No   • Sexual activity: Yes     Partners: Female   Other Topics Concern   • Not on file   Social History Narrative    Daily caffeine consumption    Denied hx of exercises regularly    Denied hx of domestic violence    Full time employment    mariied     Social Drivers of Health     Financial Resource Strain: Not on file   Food Insecurity: No Food Insecurity (4/4/2025)    Hunger Vital Sign    • Worried About Running Out of Food in the Last Year: Never true    • Ran Out of Food in the Last Year: Never true   Transportation Needs: No Transportation Needs (4/4/2025)    PRAPARE - Transportation    • Lack of Transportation (Medical): No    • Lack of Transportation (Non-Medical): No   Physical Activity: Not on file   Stress: Not on file   Social Connections: Not on file   Intimate Partner Violence: Not on file   Housing Stability: Low Risk  (4/4/2025)    Housing Stability Vital Sign    • Unable to Pay for Housing in the Last Year: No    • Number of Times Moved in the Last Year: 0    • Homeless in the Last Year: No     Scheduled Meds:  Current Facility-Administered Medications   Medication Dose Route Frequency Provider Last Rate   • cyanocobalamin  1,000 mcg Intramuscular Q30 Days        Continuous Infusions:   PRN  "Meds:.  No Known Allergies        Physical Examination:    Ht 6' 3\" (1.905 m)   Wt 97.1 kg (214 lb)   BMI 26.75 kg/m²     Gen: A&Ox3, NAD  Cardiac: regular rate  Chest: non labored breathing  Abdomen: Non-distended      Right Upper Extremity:  Skin CDI  No obvious deformity of the shoulder, arm, elbow, forearm, wrist, hand  TTP DRUJ an dorsal radiocarpal joint  Pain with supination  DRUJ stable to shuck in neutral/pronation/supination without pain  Non-tender ulnocarpal joint  No pain with resisted wrist flexion/extension  Sensation intact to light touch in the axillary median, ulnar, and radial nerve distributions  Full digital ROM  Warm, well-perfused digits  Cap refill <2s    Left Upper Extremity:  Skin CDI  No obvious deformity of the shoulder, arm, elbow, forearm, wrist, hand  Sensation intact to light touch in the axillary median, ulnar, and radial nerve distributions  Full digital ROM  Warm, well-perfused digits  Cap refill <2s      Studies:  Radiographs: I personally reviewed and independently interpreted the available radiographs.  4/8/25: Radiographs of the right wrist, multiple views, demonstrate no acute fracture or dislocation. Mild degenerative changes at the DRUJ and radiocarpal joint, with chronic appearing small calcification within the dorsal DRUJ ligament, near the radial attachment. Normal carpal alignment.      Assessment & Plan  Arthritis of right wrist  64 y.o. male presents with signs and symptoms consistent with the above diagnosis.  We discussed the natural history of this condition and its pathogenesis.  We discussed operative and nonoperative treatment options. Based on his clinical exam and imaging, his symptoms are consistent with mild radiocarpal and DRUJ arthritis. Overall, his pain has been managed with oral tylenol and bracing as needed. We may also consider CSI as needed or surgery if his symptoms progress. It is recommended he return to the office as needed if the problem fails " to improve, worsens, or recurs.    Orders:  •  Ambulatory Referral to Orthopedic Surgery      he expressed understanding of the plan and agreed. We encouraged them to contact our office with any questions or concerns.         Abilio Estevez MD  Hand and Upper Extremity Surgery        *This note was dictated using Dragon voice recognition software. Please excuse any word substitutions or errors.*      Scribe Attestation      I,:  Meche Lyles PA-C am acting as a scribe while in the presence of the attending physician.:       I,:  Abilio Estevez MD personally performed the services described in this documentation    as scribed in my presence.:

## 2025-04-17 NOTE — PROGRESS NOTES
The HAND & UPPER EXTREMITY OFFICE VISIT   Referred By:  Ashley Abad Md  [address] [facility]  [address]  [place] [address]      Chief Complaint:     Right wrist pain    History of Present Illness:   64 y.o., male presents with intermittent right wrist pain for about 1 year. He denies any known injury or trauma to the wrist. He reports that the pain occurs intermittently and typically flares up when grabbing objects. The pain can last anywhere from a few seconds up to a few days. He typically takes tylenol as needed which helps with the pain. He also does have a wrist brace which he wears as needed.   Seen and referred by his PCP, Dr. Abad. Note and imaging reviewed in detail today.     ADLs: Community ambulator  Smoke: denies ETOH: occasionally   Drugs:  denies Job: retired       Past Medical History:  Past Medical History:   Diagnosis Date    Anemia 10/8/2022    During treatment in hospital    Anxiety unknown    Slight anxiety levels when anticipating a life changing event.    Difficulty sleeping     GERD (gastroesophageal reflux disease)     Impotence, organic     Insomnia      Past Surgical History:   Procedure Laterality Date    ADENOIDECTOMY      BREAST SURGERY      lumpectomy    HERNIA REPAIR  11/14/2014    JOINT REPLACEMENT  5/5/2021    total knee replacement    KNEE SURGERY      TONSILLECTOMY      VASECTOMY  1990     Family History   Problem Relation Age of Onset    Lung cancer Mother     Heart disease Father     Colon cancer Father      Social History     Socioeconomic History    Marital status: /Civil Union     Spouse name: Not on file    Number of children: Not on file    Years of education: Not on file    Highest education level: Not on file   Occupational History    Not on file   Tobacco Use    Smoking status: Former    Smokeless tobacco: Never   Vaping Use    Vaping status: Never Used   Substance and Sexual Activity    Alcohol use: Yes     Alcohol/week: 10.0 standard drinks of alcohol      "Types: 10 Cans of beer per week     Comment: I only consume alcohol over weekend or special event.    Drug use: No    Sexual activity: Yes     Partners: Female   Other Topics Concern    Not on file   Social History Narrative    Daily caffeine consumption    Denied hx of exercises regularly    Denied hx of domestic violence    Full time employment    mariied     Social Drivers of Health     Financial Resource Strain: Not on file   Food Insecurity: No Food Insecurity (4/4/2025)    Hunger Vital Sign     Worried About Running Out of Food in the Last Year: Never true     Ran Out of Food in the Last Year: Never true   Transportation Needs: No Transportation Needs (4/4/2025)    PRAPARE - Transportation     Lack of Transportation (Medical): No     Lack of Transportation (Non-Medical): No   Physical Activity: Not on file   Stress: Not on file   Social Connections: Not on file   Intimate Partner Violence: Not on file   Housing Stability: Low Risk  (4/4/2025)    Housing Stability Vital Sign     Unable to Pay for Housing in the Last Year: No     Number of Times Moved in the Last Year: 0     Homeless in the Last Year: No     Scheduled Meds:  Current Facility-Administered Medications   Medication Dose Route Frequency Provider Last Rate    cyanocobalamin  1,000 mcg Intramuscular Q30 Days        Continuous Infusions:   PRN Meds:.  No Known Allergies        Physical Examination:    Ht 6' 3\" (1.905 m)   Wt 97.1 kg (214 lb)   BMI 26.75 kg/m²     Gen: A&Ox3, NAD  Cardiac: regular rate  Chest: non labored breathing  Abdomen: Non-distended      Right Upper Extremity:  Skin CDI  No obvious deformity of the shoulder, arm, elbow, forearm, wrist, hand  TTP DRUJ an dorsal radiocarpal joint  Pain with supination  DRUJ stable to shuck in neutral/pronation/supination without pain  Non-tender ulnocarpal joint  No pain with resisted wrist flexion/extension  Sensation intact to light touch in the axillary median, ulnar, and radial nerve " distributions  Full digital ROM  Warm, well-perfused digits  Cap refill <2s    Left Upper Extremity:  Skin CDI  No obvious deformity of the shoulder, arm, elbow, forearm, wrist, hand  Sensation intact to light touch in the axillary median, ulnar, and radial nerve distributions  Full digital ROM  Warm, well-perfused digits  Cap refill <2s      Studies:  Radiographs: I personally reviewed and independently interpreted the available radiographs.  4/8/25: Radiographs of the right wrist, multiple views, demonstrate no acute fracture or dislocation. Mild degenerative changes at the DRUJ and radiocarpal joint, with chronic appearing small calcification within the dorsal DRUJ ligament, near the radial attachment. Normal carpal alignment.      Assessment & Plan  Arthritis of right wrist  64 y.o. male presents with signs and symptoms consistent with the above diagnosis.  We discussed the natural history of this condition and its pathogenesis.  We discussed operative and nonoperative treatment options. Based on his clinical exam and imaging, his symptoms are consistent with mild radiocarpal and DRUJ arthritis. Overall, his pain has been managed with oral tylenol and bracing as needed. We may also consider CSI as needed or surgery if his symptoms progress. It is recommended he return to the office as needed if the problem fails to improve, worsens, or recurs.    Orders:    Ambulatory Referral to Orthopedic Surgery      he expressed understanding of the plan and agreed. We encouraged them to contact our office with any questions or concerns.         Abilio Estevez MD  Hand and Upper Extremity Surgery        *This note was dictated using Dragon voice recognition software. Please excuse any word substitutions or errors.*      Scribe Attestation      I,:  Meche Lyles PA-C am acting as a scribe while in the presence of the attending physician.:       I,:  Abilio Estevez MD personally performed the services described in  this documentation    as scribed in my presence.:

## 2025-04-23 ENCOUNTER — TELEPHONE (OUTPATIENT)
Age: 65
End: 2025-04-23

## 2025-04-23 NOTE — TELEPHONE ENCOUNTER
Pt called to advise he has a virtual appt on  April 30th with Nanda Diaz. He stated when the appt was made he told our office that does not have a camera on his desktop and said he was told he could use his phone and would be sent a text link to his phone. He than stated instead he received an email link and needs help setting up for the virtual appt.

## 2025-04-24 ENCOUNTER — HOSPITAL ENCOUNTER (OUTPATIENT)
Dept: MRI IMAGING | Facility: HOSPITAL | Age: 65
End: 2025-04-24
Payer: MEDICARE

## 2025-04-24 DIAGNOSIS — G31.84 MILD COGNITIVE IMPAIRMENT: ICD-10-CM

## 2025-04-24 DIAGNOSIS — G20.C PARKINSONISM (HCC): ICD-10-CM

## 2025-04-24 PROCEDURE — 70551 MRI BRAIN STEM W/O DYE: CPT

## 2025-04-25 NOTE — TELEPHONE ENCOUNTER
Pt called and states that he is scheduled for virtual visit next week and he is trying to figure it out.   Advised that it is too soon to have link on his [employer] as vanessa is not until next week,  advised that we will be contacting him prior to the appt to review his chart and any further questions can be answered at that time

## 2025-04-29 ENCOUNTER — TELEPHONE (OUTPATIENT)
Dept: NEUROLOGY | Facility: CLINIC | Age: 65
End: 2025-04-29

## 2025-04-29 NOTE — TELEPHONE ENCOUNTER
Jaylon Baker MA to Me   ZP    4/29/25  9:42 AM  Called and lmom to call office back to get help with appointment for tomorrow with ruma kelly

## 2025-04-30 ENCOUNTER — TELEMEDICINE (OUTPATIENT)
Dept: NEUROLOGY | Facility: CLINIC | Age: 65
End: 2025-04-30
Payer: MEDICARE

## 2025-04-30 ENCOUNTER — TELEPHONE (OUTPATIENT)
Dept: NEUROLOGY | Facility: CLINIC | Age: 65
End: 2025-04-30

## 2025-04-30 DIAGNOSIS — G20.C PARKINSONISM (HCC): Primary | ICD-10-CM

## 2025-04-30 PROCEDURE — G2211 COMPLEX E/M VISIT ADD ON: HCPCS | Performed by: PHYSICIAN ASSISTANT

## 2025-04-30 PROCEDURE — 99214 OFFICE O/P EST MOD 30 MIN: CPT | Performed by: PHYSICIAN ASSISTANT

## 2025-04-30 RX ORDER — CARBIDOPA AND LEVODOPA 25; 100 MG/1; MG/1
TABLET ORAL
Qty: 180 TABLET | Refills: 3 | Status: SHIPPED | OUTPATIENT
Start: 2025-04-30

## 2025-04-30 NOTE — ASSESSMENT & PLAN NOTE
Patient with left greater than right resting greater than action hand tremors over the past year.  Has also noticed some changes in his memory as well as pauses with his speech the past 1 to 2 years. No family history of tremors, no improvement with alcohol. Given his history of cognitive complaints which presented prior to his tremor onset, this would also raise the question of Lewy Body dementia.    He denies any improvement of his tremors with the addition of low-dose Sinemet.  No clear side effects with the medication.  On exam there may be some improvement of his bradykinesia noted at the initial visit.  We had a long discussion in regards to continued workup.  Options at this time would include increasing his Sinemet dose further versus getting a confirmatory study such as a SynOne biopsy or DaTscan.  For now would like to continue to increase his Sinemet dose further.    We will have him start by taking Sinemet 25/100 mg 1 tabs 3 times a day (every 5 hours) for the next week.  If there is no improvement of symptoms but no side effects he can further increase to 2 tabs 3 times a day.  He will watch for any side effects with this increase including hallucinations, dyskinesia or dizziness on standing.  I will have him follow-up for a virtual visit in a few weeks to assess how he is doing on higher dosing.  If there is still no clear improvement of symptoms then we could consider one of the above studies to help with diagnosis.    He also continues to have some issues with memory loss.  At his last visit he scored a 25/30 on MoCA testing.  Did review his brain MRI which has not findings suggestive of a degenerative process given low HOC.  No structural causes noted for his tremors on that imaging.  B12 was also low and have asked the MA to reach out to the patient to have him start a B12 supplement, 1000 mcg daily.  We can recheck his B12 in a few months.    Patient was encouraged to increase mind stimulating  activities such as reading, crosswords, word searches, puzzles, Soduku, solitaire, coloring and other brain games.  We also discussed the importance of staying physically active and eating a health diet such as the Mediterranean or MIND diet.      Wife mentions that he has a lot of anxiety and has for many years.  We did discuss options such as a trial of medication to treat his anxiety.  At this time however he does not wish to start any medication for that.  We also discussed options such as the counter CBD oil or trying some breathing exercises when feeling anxious.  We will continue to monitor at this time.    Orders:    carbidopa-levodopa (Sinemet)  mg per tablet; Take 1.5tabs tid for 1 week then 2 tabs tid

## 2025-04-30 NOTE — PATIENT INSTRUCTIONS
Increase Sinemet dose to 1.5 tabs 3 times a day (every 5 hours) for the next week, if no benefit increase further to 2 tabs 3 times a day.  Watch for any improvement of tremor control as well as any side effects that has hallucinations, dizziness when standing or dyskinesia (involuntary movements).  Encouraged to remain active.  Will continue to watch memory and mood issues.  If anxiety remains an ongoing issue may consider a trial of an SSRI such as Lexapro or citalopram.

## 2025-04-30 NOTE — PROGRESS NOTES
Virtual Regular VisitName: Leonardo Melendez      : 1960      MRN: [id number]  Encounter Provider: Nanda Diaz PA-C  Encounter Date: 2025   [facility]  :Assessment & Plan        History of Present Illness {?Quick Links Encounters * My Last Note * Last Note in Specialty * Snapshot * Since Last Visit * History :25445}    HPI  Review of Systems    Objective {?Quick Links Trend Vitals * Enter New Vitals * Results Review * Timeline (Adult) * Labs * Imaging * Cardiology * Procedures * Lung Cancer Screening * Surgical eConsent :35063}  There were no vitals taken for this visit.    Physical Exam    Administrative Statements   Encounter provider Nanda Diaz PA-C    The Patient is located at {[facility] Virtual Patient Location:[address]} and in the following state in which I hold an active license {[facility] virtual patient location:[address]}.    The patient was identified by name and date of birth. Leonardo ESCAMILLA Nora was informed that this is a telemedicine visit and that the visit is being conducted through {[facility] VIRTUAL VISIT MEDIUM:78746}.  {Telemedicine confidentiality :42494} {Telemedicine participants:35587}  He acknowledged consent and understanding of privacy and security of the video platform. The patient has agreed to participate and understands they can discontinue the visit at any time.    I have spent a total time of *** minutes in caring for this patient on the day of the visit/encounter including {[facility] Counseling Topics:[id number]}, not including the time spent for establishing the audio/video connection.

## 2025-04-30 NOTE — PROGRESS NOTES
Virtual Regular VisitName: Leonardo Melendez      : 1960      MRN: [id number]  Encounter Provider: Nanda Diaz PA-C  Encounter Date: 2025   Encounter department: [facility] [place]  :  Assessment & Plan  Parkinsonism (HCC)  Patient with left greater than right resting greater than action hand tremors over the past year.  Has also noticed some changes in his memory as well as pauses with his speech the past 1 to 2 years. No family history of tremors, no improvement with alcohol. Given his history of cognitive complaints which presented prior to his tremor onset, this would also raise the question of Lewy Body dementia.    He denies any improvement of his tremors with the addition of low-dose Sinemet.  No clear side effects with the medication.  On exam there may be some improvement of his bradykinesia noted at the initial visit.  We had a long discussion in regards to continued workup.  Options at this time would include increasing his Sinemet dose further versus getting a confirmatory study such as a SynOne biopsy or DaTscan.  For now would like to continue to increase his Sinemet dose further.    We will have him start by taking Sinemet 25/100 mg 1 tabs 3 times a day (every 5 hours) for the next week.  If there is no improvement of symptoms but no side effects he can further increase to 2 tabs 3 times a day.  He will watch for any side effects with this increase including hallucinations, dyskinesia or dizziness on standing.  I will have him follow-up for a virtual visit in a few weeks to assess how he is doing on higher dosing.  If there is still no clear improvement of symptoms then we could consider one of the above studies to help with diagnosis.    He also continues to have some issues with memory loss.  At his last visit he scored a 25/30 on MoCA testing.  Did review his brain MRI which has not findings suggestive of a degenerative process given low HOC.  No structural  "causes noted for his tremors on that imaging.  B12 was also low and have asked the MA to reach out to the patient to have him start a B12 supplement, 1000 mcg daily.  We can recheck his B12 in a few months.    Patient was encouraged to increase mind stimulating activities such as reading, crosswords, word searches, puzzles, Soduku, solitaire, coloring and other brain games.  We also discussed the importance of staying physically active and eating a health diet such as the Mediterranean or MIND diet.      Wife mentions that he has a lot of anxiety and has for many years.  We did discuss options such as a trial of medication to treat his anxiety.  At this time however he does not wish to start any medication for that.  We also discussed options such as the counter CBD oil or trying some breathing exercises when feeling anxious.  We will continue to monitor at this time.    Orders:    carbidopa-levodopa (Sinemet)  mg per tablet; Take 1.5tabs tid for 1 week then 2 tabs tid        History of Present Illness     Dread Melendez is a 64 year old male with SVT followed by Cardiology on Metoprolol, insomnia, and anxiety who presents in follow up for parkinsonism. To review, he noticed some occasional tremor in the hand when holding a cup of coffee or eating for years. He began to notice worsening of the tremors about a year ago. At that time he was having increased stress with his work. Tremors worse on the left and began to come out at rest as well. Also with some changes with his short term memory. He feels at times that his brain will \"pause\" he will be talking and the words will not want to form, this has been noticeable for over a year, no clear progression. He has a few drinks on the weekends, no improvement of tremors. Mother had dementia, unclear if she had tremors as well. Sister is starting to show some signs of dementia.     At his initial visit he was noted to have left greater than right bradykinesia, " rigidity, resting tremor, primitive reflexes, decreased arm swing and hypomimia consistent with parkinsonism.  He was started on a trial of Sinemet. Scored 25/30 on MoCA.  Sent for labs and MRI brain as well.     INTERVAL HISTORY:  He denies any changes in symptoms since starting the Sinemet   He felt that there was one day when the tremors were worse after increasing the Sinemet dose   No benefit, tremors are the same   Tremors worse at rest   Wife will notice them more in the evening when sitting watching TV around 8pm   Left side worse than right   He denies any slowness   Walking is overall fine   He will stumble at times, nothing worse than it had been   No falls   No swallowing issues  No tremors when sleeping   He does not sleep with his wife, unclear if there is any thrashing   No hallucinations   He continues to have issues with speech at times,     CURRENT MEDICATIONS:  Sinemet 25/100mg 1tab tid (around mealtimes)      WORKUP:    B12 - 227    MRI brain with NQ 4/24/25 - No acute intracranial abnormality. Minimal nonspecific white matter disease, likely chronic microangiopathy or chronic migraines.NeuroQuant analysis was performed: Normal hippocampal volume and enlarged ventricular system: Findings do not support hippocampal degeneration. Possible expansion of ventricular system without medial temporal lobe focused ex-vacuo process. 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 Recommend reevaluation with Neuroquant imaging in 6-12 months.        Review of Systems   Constitutional:  Negative for appetite change, fatigue and fever.   HENT: Negative.  Negative for hearing loss, tinnitus, trouble swallowing and voice change.    Eyes: Negative.  Negative for photophobia, pain and visual disturbance.   Respiratory: Negative.  Negative for shortness of breath.    Cardiovascular: Negative.  Negative for  palpitations.   Gastrointestinal: Negative.  Negative for nausea and vomiting.   Endocrine: Negative.  Negative for cold intolerance.   Genitourinary: Negative.  Negative for dysuria, frequency and urgency.   Musculoskeletal:  Negative for back pain, gait problem, myalgias, neck pain and neck stiffness.   Skin: Negative.  Negative for rash.   Allergic/Immunologic: Negative.    Neurological:  Negative for dizziness, tremors, seizures, syncope, facial asymmetry, speech difficulty, weakness, light-headedness, numbness and headaches.   Hematological: Negative.  Does not bruise/bleed easily.   Psychiatric/Behavioral: Negative.  Negative for confusion, hallucinations and sleep disturbance.        Objective   There were no vitals taken for this visit.    Physical Exam  Constitutional:       Appearance: Normal appearance.   Pulmonary:      Effort: Pulmonary effort is normal.   Neurological:      Mental Status: He is alert.   Psychiatric:         Mood and Affect: Mood normal.         Behavior: Behavior is cooperative.                                   Date of exam:  3/27/25  4/30/25        VV   Speech  2  2   Facial Expression  2  2   Rigidity - Neck     -   Rigidity - Upper Extremity (Right)  2  -   Rigidity - Upper Extremity (Left)   1  -   Rigidity - Lower Extremity (Right)  1  -   Rigidity - Lower Extremity (Left)   1  -   Finger Taps (Right)   0  0   Finger Taps (Left)   1  0   Hand  (Right)  0  0   Hand  (Left)   1  1   Pronation/Supination (Right)  1  0   Pronation/Supination (Left)   1  1   Heel Taps (Right) 2  1   Heel Taps(Left) 1  1   Arising from Chair   1  1   Gait   2     Postural Stability   1     Posture 2     Global spontaneity of movement 2  2   Postural Tremor (Right) 2  1   Postural Tremor (Left) 1  1   Kinetic Tremor (Right)  1  1   Kinetic Tremor (Left)  1  1   Rest tremor  RUE 1  1   Rest tremor  LUE 2  2   Rest tremor  RLE 0  0   Reset tremor  LLE 0  0   Lip/Jaw Tremor  0  0   Motor Exam  Total:            Administrative Statements   Encounter provider Nanda Diaz PA-C    The Patient is located at Home and in the following state in which I hold an active license [place].    The patient was identified by name and date of birth. Leonardo Melendez was informed that this is a telemedicine visit and that the visit is being conducted through the Epic Embedded platform. He agrees to proceed..  My office door was closed. No one else was in the room.  He acknowledged consent and understanding of privacy and security of the video platform. The patient has agreed to participate and understands they can discontinue the visit at any time.    I have spent a total time of 30 minutes in caring for this patient on the day of the visit/encounter including Diagnostic results, Prognosis, Risks and benefits of tx options, Instructions for management, Patient and family education, Impressions, Counseling / Coordination of care, Documenting in the medical record, and Obtaining or reviewing history  , not including the time spent for establishing the audio/video connection.

## 2025-05-07 ENCOUNTER — TELEPHONE (OUTPATIENT)
Age: 65
End: 2025-05-07

## 2025-05-07 NOTE — TELEPHONE ENCOUNTER
"Received call from patient states that he went to  to get his monthly B12 injection and states they were not able to give it to him as there was no \"standing order\" per the UC.   Patient states the office to far to drive to every month for this. Please advise.   "

## 2025-05-08 NOTE — TELEPHONE ENCOUNTER
Patient called and was informed on providers message. Patient would like to know if he is able to do 4 weeks or will provider prefer 6 weeks. Patient would like a call back to schedules visits.

## 2025-05-13 ENCOUNTER — CLINICAL SUPPORT (OUTPATIENT)
Dept: FAMILY MEDICINE CLINIC | Facility: CLINIC | Age: 65
End: 2025-05-13
Payer: MEDICARE

## 2025-05-13 DIAGNOSIS — E53.8 VITAMIN B12 DEFICIENCY: Primary | ICD-10-CM

## 2025-05-13 PROCEDURE — 96372 THER/PROPH/DIAG INJ SC/IM: CPT

## 2025-05-13 RX ADMIN — CYANOCOBALAMIN 1000 MCG: 1000 INJECTION, SOLUTION INTRAMUSCULAR; SUBCUTANEOUS at 14:55

## 2025-05-13 NOTE — PROGRESS NOTES
After obtaining consent, and per orders of Dr. Abad, injection of B12 given by Torrie Newton. Patient instructed to remain in clinic for 20 minutes afterwards, and to report any adverse reaction to me immediately.

## 2025-05-19 ENCOUNTER — TELEPHONE (OUTPATIENT)
Dept: NEUROLOGY | Facility: CLINIC | Age: 65
End: 2025-05-19

## 2025-06-04 ENCOUNTER — TELEMEDICINE (OUTPATIENT)
Dept: NEUROLOGY | Facility: CLINIC | Age: 65
End: 2025-06-04
Payer: MEDICARE

## 2025-06-04 DIAGNOSIS — G20.C PARKINSONISM (HCC): Primary | ICD-10-CM

## 2025-06-04 PROCEDURE — G2211 COMPLEX E/M VISIT ADD ON: HCPCS | Performed by: PHYSICIAN ASSISTANT

## 2025-06-04 PROCEDURE — 99214 OFFICE O/P EST MOD 30 MIN: CPT | Performed by: PHYSICIAN ASSISTANT

## 2025-06-04 NOTE — ASSESSMENT & PLAN NOTE
Patient with left greater than right resting greater than action hand tremors over the past year.  Has also noticed some changes in his memory as well as pauses with his speech the past 1 to 2 years. No family history of tremors, no improvement with alcohol. Given his history of cognitive complaints which presented prior to his tremor onset, this would raise the question of Lewy Body dementia however no issues with hallucinations.    He had had overall improvement with increase of his Sinemet last visit.  He does feel that tremors are better controlled overall.  No wearing off if taken every 5 hours, he will notice worsening of tremors if he is late for a dose however.  On exam there continues to be some improvement in regards to his bradykinesia with higher dosing of Sinemet as well.  Both his subjective and objective improvement with dopamine replacement would be more consistent with a parkinsonism.    We had a long discussion in regards to diagnosis, prognosis and treatment options.  There remains a question of Parkinson disease versus Lewy body dementia although he continues to have no complaints of hallucinations.  We discussed the option of increasing his dopamine replacement further however he is having some issues with occasional dizziness when standing.  He is concerned with making any further adjustments as this may make this worse.  For now he feels that symptoms are fairly well-controlled with current dosing and would like to remain on Sinemet  mg 2 tabs 3 times a day.  As far as the episodic dizziness, we did discuss the importance of increasing his fluids, changing positions slowly and wearing compression stockings.  Will continue to monitor this for now.    He also continues to have some issues with memory loss.  In the past he scored a 25/30 on MoCA testing.  Did review his brain MRI which was suggestive of a degenerative process given low HOC.  B12 was also low and he is now getting B12  injections through his PCP.  Wife had some questions guarding over-the-counter medications for memory.  We also did discuss prescription options.  At time however he does not wish to be on any further prescription medication.  Will continue to monitor for now.    Patient was encouraged to increase mind stimulating activities such as reading, crosswords, word searches, puzzles, Soduku, solitaire, coloring and other brain games.  We also discussed the importance of staying physically active and eating a health diet such as the Mediterranean or MIND diet.

## 2025-06-04 NOTE — PROGRESS NOTES
Virtual Regular Visit  Name: Leonardo Melendez      : 1960      MRN: [id number]  Encounter Provider: Nanda Diaz PA-C  Encounter Date: 2025   Encounter department: [facility] [place]  :  Assessment & Plan  Parkinsonism (HCC)  Patient with left greater than right resting greater than action hand tremors over the past year.  Has also noticed some changes in his memory as well as pauses with his speech the past 1 to 2 years. No family history of tremors, no improvement with alcohol. Given his history of cognitive complaints which presented prior to his tremor onset, this would raise the question of Lewy Body dementia however no issues with hallucinations.    He had had overall improvement with increase of his Sinemet last visit.  He does feel that tremors are better controlled overall.  No wearing off if taken every 5 hours, he will notice worsening of tremors if he is late for a dose however.  On exam there continues to be some improvement in regards to his bradykinesia with higher dosing of Sinemet as well.  Both his subjective and objective improvement with dopamine replacement would be more consistent with a parkinsonism.    We had a long discussion in regards to diagnosis, prognosis and treatment options.  There remains a question of Parkinson disease versus Lewy body dementia although he continues to have no complaints of hallucinations.  We discussed the option of increasing his dopamine replacement further however he is having some issues with occasional dizziness when standing.  He is concerned with making any further adjustments as this may make this worse.  For now he feels that symptoms are fairly well-controlled with current dosing and would like to remain on Sinemet  mg 2 tabs 3 times a day.  As far as the episodic dizziness, we did discuss the importance of increasing his fluids, changing positions slowly and wearing compression stockings.  Will continue to  "monitor this for now.    He also continues to have some issues with memory loss.  In the past he scored a 25/30 on MoCA testing.  Did review his brain MRI which was suggestive of a degenerative process given low HOC.  B12 was also low and he is now getting B12 injections through his PCP.  Wife had some questions guarding over-the-counter medications for memory.  We also did discuss prescription options.  At time however he does not wish to be on any further prescription medication.  Will continue to monitor for now.    Patient was encouraged to increase mind stimulating activities such as reading, crosswords, word searches, puzzles, Soduku, solitaire, coloring and other brain games.  We also discussed the importance of staying physically active and eating a health diet such as the Mediterranean or MIND diet.               History of Present Illness     Dread Melendez is a 64 year old male with SVT followed by Cardiology on Metoprolol, insomnia, and anxiety who presents in follow up for parkinsonism. To review, he noticed some occasional tremor in the hand when holding a cup of coffee or eating for years. He began to notice worsening of the tremors about a year ago. At that time he was having increased stress with his work. Tremors worse on the left and began to come out at rest as well. Also with some changes with his short term memory. He feels at times that his brain will \"pause\" he will be talking and the words will not want to form, this has been noticeable for over a year, no clear progression. He has a few drinks on the weekends, no improvement of tremors. Mother had dementia, unclear if she had tremors as well. Sister is starting to show some signs of dementia.      At his initial visit he was noted to have left greater than right bradykinesia, rigidity, resting tremor, primitive reflexes, decreased arm swing and hypomimia consistent with parkinsonism.  He was started on a trial of Sinemet. Scored 25/30 on " MoCA.  Sent for labs and MRI brain as well.     At his last visit his Sinemet dose was increased further.      INTERVAL HISTORY:  He has noticed a benefit with the increase of Sinemet   The tremors are no longer as often as they had been   Tremors are still present at times, worse in the evening   No issues with walking   No trouble with ADLs   Some episode of lightheadedness when standing up   He feels that increasing his water intake has helped   No issues with swallowing   No swallowing issues   He is trying to keep record of when he takes his medication, will often take them every 5-6 hours   No wearing off if taking the medication every 5 hours, tremors worse if he misses a dose   No hallucinations   Memory is not as good as it was in the past     Getting B12 injections through his PCP    CURRENT MEDICATIONS:  Sinemet 25/100mg 2tab tid (around mealtimes)        WORKUP:  B12 - 227     MRI brain with NQ 4/24/25 - No acute intracranial abnormality. Minimal nonspecific white matter disease, likely chronic microangiopathy or chronic migraines.NeuroQuant analysis was performed: Normal hippocampal volume and enlarged ventricular system: Findings do not support hippocampal degeneration. Possible expansion of ventricular system without medial temporal lobe focused ex-vacuo process. 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 Recommend reevaluation with Neuroquant imaging in 6-12 months.        Review of Systems    Objective   There were no vitals taken for this visit.    Physical Exam  Constitutional:       Appearance: Normal appearance.   Pulmonary:      Effort: Pulmonary effort is normal.     Neurological:      Mental Status: He is alert and oriented to person, place, and time.      Comments: MoCA 25/30 - 3/27/25.            Date of exam:  6/4/25 4/30/25      VV  VV   Speech  2  2   Facial Expression  2  2    Rigidity - Neck    -   Rigidity - Upper Extremity (Right)    -   Rigidity - Upper Extremity (Left)     -   Rigidity - Lower Extremity (Right)    -   Rigidity - Lower Extremity (Left)     -   Finger Taps (Right)   0  0   Finger Taps (Left)   0  0   Hand  (Right)  0  0   Hand  (Left)   1  1   Pronation/Supination (Right)  0  0   Pronation/Supination (Left)   0  1   Heel Taps (Right) 1  1   Heel Taps(Left) 1  1   Arising from Chair   1  1   Gait        Postural Stability        Posture 2     Global spontaneity of movement 2  2   Postural Tremor (Right) 1  1   Postural Tremor (Left) 1  1   Kinetic Tremor (Right)  0  1   Kinetic Tremor (Left)  0  1   Rest tremor  RUE 1  1   Rest tremor  LUE 2  2   Rest tremor  RLE 0  0   Reset tremor  LLE 0  0   Lip/Jaw Tremor  0  0   Motor Exam Total:            Administrative Statements   Encounter provider Nanda Diaz PA-C    The Patient is located at Home and in the following state in which I hold an active license [place].    The patient was identified by name and date of birth. Leonardo Melendez was informed that this is a telemedicine visit and that the visit is being conducted through the Epic Embedded platform. He agrees to proceed..  My office door was closed. No one else was in the room.  He acknowledged consent and understanding of privacy and security of the video platform. The patient has agreed to participate and understands they can discontinue the visit at any time.    I have spent a total time of 25 minutes in caring for this patient on the day of the visit/encounter including Diagnostic results, Risks and benefits of tx options, Instructions for management, Patient and family education, Impressions, Counseling / Coordination of care, Documenting in the medical record, Reviewing/placing orders in the medical record (including tests, medications, and/or procedures), and Obtaining or reviewing history  , not including the time spent for establishing the  audio/video connection.

## 2025-06-10 ENCOUNTER — CLINICAL SUPPORT (OUTPATIENT)
Dept: FAMILY MEDICINE CLINIC | Facility: CLINIC | Age: 65
End: 2025-06-10
Payer: MEDICARE

## 2025-06-10 DIAGNOSIS — E53.8 B12 DEFICIENCY: Primary | ICD-10-CM

## 2025-06-10 PROCEDURE — 96372 THER/PROPH/DIAG INJ SC/IM: CPT

## 2025-06-10 RX ADMIN — CYANOCOBALAMIN 1000 MCG: 1000 INJECTION, SOLUTION INTRAMUSCULAR; SUBCUTANEOUS at 14:11

## 2025-06-10 NOTE — PROGRESS NOTES
Monthly B12 injection given in left deltoid.  Patient tolerated injection well and left office in no distress.

## 2025-07-01 ENCOUNTER — APPOINTMENT (OUTPATIENT)
Dept: LAB | Facility: CLINIC | Age: 65
End: 2025-07-01
Attending: FAMILY MEDICINE
Payer: MEDICARE

## 2025-07-20 DIAGNOSIS — G47.00 INSOMNIA, UNSPECIFIED TYPE: ICD-10-CM

## 2025-07-22 RX ORDER — ZOLPIDEM TARTRATE 10 MG/1
10 TABLET ORAL
Qty: 30 TABLET | Refills: 3 | Status: SHIPPED | OUTPATIENT
Start: 2025-07-22

## 2025-07-22 NOTE — TELEPHONE ENCOUNTER
Requested Prescriptions     Pending Prescriptions Disp Refills    zolpidem (AMBIEN) 10 mg tablet 30 tablet 0     Sig: Take 1 tablet (10 mg total) by mouth daily at bedtime as needed for sleep